# Patient Record
Sex: MALE | Race: WHITE | NOT HISPANIC OR LATINO | URBAN - METROPOLITAN AREA
[De-identification: names, ages, dates, MRNs, and addresses within clinical notes are randomized per-mention and may not be internally consistent; named-entity substitution may affect disease eponyms.]

---

## 2020-02-04 PROBLEM — Z00.00 ENCOUNTER FOR PREVENTIVE HEALTH EXAMINATION: Status: ACTIVE | Noted: 2020-02-04

## 2020-02-10 ENCOUNTER — OUTPATIENT (OUTPATIENT)
Dept: OUTPATIENT SERVICES | Facility: HOSPITAL | Age: 65
LOS: 1 days | Discharge: HOME | End: 2020-02-10

## 2020-02-12 ENCOUNTER — APPOINTMENT (OUTPATIENT)
Dept: HEMATOLOGY ONCOLOGY | Facility: CLINIC | Age: 65
End: 2020-02-12
Payer: COMMERCIAL

## 2020-02-12 ENCOUNTER — RESULT REVIEW (OUTPATIENT)
Age: 65
End: 2020-02-12

## 2020-02-12 ENCOUNTER — OUTPATIENT (OUTPATIENT)
Dept: OUTPATIENT SERVICES | Facility: HOSPITAL | Age: 65
LOS: 1 days | Discharge: HOME | End: 2020-02-12

## 2020-02-12 VITALS
HEART RATE: 107 BPM | SYSTOLIC BLOOD PRESSURE: 136 MMHG | OXYGEN SATURATION: 97 % | BODY MASS INDEX: 33.8 KG/M2 | RESPIRATION RATE: 16 BRPM | DIASTOLIC BLOOD PRESSURE: 83 MMHG | HEIGHT: 64.17 IN | TEMPERATURE: 101.3 F | WEIGHT: 197.98 LBS

## 2020-02-12 DIAGNOSIS — C34.90 MALIGNANT NEOPLASM OF UNSPECIFIED PART OF UNSPECIFIED BRONCHUS OR LUNG: ICD-10-CM

## 2020-02-12 LAB
BASOPHILS # BLD AUTO: 0 K/UL — SIGNIFICANT CHANGE UP (ref 0–0.2)
BASOPHILS NFR BLD AUTO: 0.1 % — SIGNIFICANT CHANGE UP (ref 0–2)
EOSINOPHIL # BLD AUTO: 0 K/UL — SIGNIFICANT CHANGE UP (ref 0–0.5)
EOSINOPHIL NFR BLD AUTO: 0.2 % — SIGNIFICANT CHANGE UP (ref 0–6)
ERYTHROCYTE [SEDIMENTATION RATE] IN BLOOD: 69 MM/HR — HIGH (ref 0–20)
HCT VFR BLD CALC: 38.8 % — LOW (ref 39–50)
HGB BLD-MCNC: 12.7 G/DL — LOW (ref 13–17)
LYMPHOCYTES # BLD AUTO: 1.8 K/UL — SIGNIFICANT CHANGE UP (ref 1–3.3)
LYMPHOCYTES # BLD AUTO: 10.5 % — LOW (ref 13–44)
MCHC RBC-ENTMCNC: 27.5 PG — SIGNIFICANT CHANGE UP (ref 27–34)
MCHC RBC-ENTMCNC: 32.8 G/DL — SIGNIFICANT CHANGE UP (ref 32–36)
MCV RBC AUTO: 84 FL — SIGNIFICANT CHANGE UP (ref 80–100)
MONOCYTES # BLD AUTO: 1 K/UL — HIGH (ref 0–0.9)
MONOCYTES NFR BLD AUTO: 6.2 % — SIGNIFICANT CHANGE UP (ref 2–14)
NEUTROPHILS # BLD AUTO: 14 K/UL — HIGH (ref 1.8–7.4)
NEUTROPHILS NFR BLD AUTO: 83 % — HIGH (ref 43–77)
PLATELET # BLD AUTO: 354 K/UL — SIGNIFICANT CHANGE UP (ref 150–400)
RBC # BLD: 4.62 M/UL — SIGNIFICANT CHANGE UP (ref 4.2–5.8)
RBC # FLD: 16.1 % — HIGH (ref 10.3–14.5)
WBC # BLD: 16.9 K/UL — HIGH (ref 3.8–10.5)
WBC # FLD AUTO: 16.9 K/UL — HIGH (ref 3.8–10.5)

## 2020-02-12 PROCEDURE — 99205 OFFICE O/P NEW HI 60 MIN: CPT

## 2020-02-13 ENCOUNTER — FORM ENCOUNTER (OUTPATIENT)
Age: 65
End: 2020-02-13

## 2020-02-14 ENCOUNTER — APPOINTMENT (OUTPATIENT)
Dept: CT IMAGING | Facility: IMAGING CENTER | Age: 65
End: 2020-02-14
Payer: COMMERCIAL

## 2020-02-14 ENCOUNTER — OUTPATIENT (OUTPATIENT)
Dept: OUTPATIENT SERVICES | Facility: HOSPITAL | Age: 65
LOS: 1 days | End: 2020-02-14
Payer: COMMERCIAL

## 2020-02-14 DIAGNOSIS — C84.00 MYCOSIS FUNGOIDES, UNSPECIFIED SITE: ICD-10-CM

## 2020-02-14 DIAGNOSIS — C34.90 MALIGNANT NEOPLASM OF UNSPECIFIED PART OF UNSPECIFIED BRONCHUS OR LUNG: ICD-10-CM

## 2020-02-14 PROCEDURE — 74177 CT ABD & PELVIS W/CONTRAST: CPT

## 2020-02-14 PROCEDURE — 71260 CT THORAX DX C+: CPT | Mod: 26

## 2020-02-14 PROCEDURE — 74177 CT ABD & PELVIS W/CONTRAST: CPT | Mod: 26

## 2020-02-14 PROCEDURE — 71260 CT THORAX DX C+: CPT

## 2020-02-14 NOTE — HISTORY OF PRESENT ILLNESS
[de-identified] : 64m, smoker (50 p/y), from [facility], with h/o mycosis fungoides, diagnosed in 2015, s/p 7 cycles of CHOP in [facility], and stage I adenocarcinoma of the lung diagnosed 5/2019 s/p surgical resection and 1 cycle of cisplatin/taxol in [facility], and a newly discovered renal mass, presenting to establish care.  A few pages of translated medical records are available from [facility] with the above information, and that is all the patient has. He is not able to obtain more medical records.\par \obinna Chahal reports that for the past 2-3 months, he has been having intermittent fevers to 103, and per pt and his wife, the cause remained unknown in [facility], that is why they traveled to the , where their daughter lives for medical care. Patient's daughter Kamini, is a PA in [place]. \par He also reports that he was told he has a renal mass in [facility]. \par \obinna Tirso reports night sweats, he denies weight loss, has intermittent high fevers and reports chills when he has fevers. Has a good appetite. Denies pain, n/v/c/d, sob, has occasional cough, denies urinary symptoms. Continues to smoke 5 cigarettes daily. \par \par

## 2020-02-14 NOTE — REVIEW OF SYSTEMS
[Patient Intake Form Reviewed] : Patient intake form was reviewed [Fever] : fever [Night Sweats] : night sweats [Negative] : Allergic/Immunologic

## 2020-02-14 NOTE — ASSESSMENT
[FreeTextEntry1] : 64m, smoker (50 p/y), from [facility], with h/o mycosis fungoides, diagnosed in 2015, s/p 7 cycles of CHOP in [facility], and stage I adenocarcinoma of the lung diagnosed 5/2019 s/p surgical resection and 1 cycle of cisplatin/taxol in [facility] and a newly discovered renal mass, presenting to establish care. \par Febrile to 101.6, this is chronic for 2-3 months. \par \par -CBC, CMP, TSH, HIV, Hep B, Hep C, LDH, Uric acid, Ua/Ucx, iron studies, b12, folate, ESR, CRP today\par -CT c/a/p with contrast stat\par -Kamini is patient's daughter who is a PA, he number is [phone], patient asks that we contact her instead of him. \par -Patient and his family understand that he might need to be referred to the ED if work up is suggestive of an acute issue. \par -RTC after the above work up

## 2020-02-25 ENCOUNTER — APPOINTMENT (OUTPATIENT)
Dept: THORACIC SURGERY | Facility: CLINIC | Age: 65
End: 2020-02-25
Payer: COMMERCIAL

## 2020-02-25 VITALS
HEIGHT: 63 IN | SYSTOLIC BLOOD PRESSURE: 135 MMHG | WEIGHT: 200 LBS | DIASTOLIC BLOOD PRESSURE: 85 MMHG | OXYGEN SATURATION: 98 % | TEMPERATURE: 98.7 F | BODY MASS INDEX: 35.44 KG/M2 | HEART RATE: 71 BPM | RESPIRATION RATE: 17 BRPM

## 2020-02-25 DIAGNOSIS — I10 ESSENTIAL (PRIMARY) HYPERTENSION: ICD-10-CM

## 2020-02-25 DIAGNOSIS — F17.200 NICOTINE DEPENDENCE, UNSPECIFIED, UNCOMPLICATED: ICD-10-CM

## 2020-02-25 DIAGNOSIS — Z86.79 PERSONAL HISTORY OF OTHER DISEASES OF THE CIRCULATORY SYSTEM: ICD-10-CM

## 2020-02-25 PROCEDURE — 99205 OFFICE O/P NEW HI 60 MIN: CPT

## 2020-02-25 RX ORDER — METOPROLOL TARTRATE 75 MG/1
TABLET, FILM COATED ORAL
Refills: 0 | Status: ACTIVE | COMMUNITY

## 2020-02-25 RX ORDER — LISINOPRIL 10 MG/1
10 TABLET ORAL
Refills: 0 | Status: ACTIVE | COMMUNITY

## 2020-02-26 ENCOUNTER — APPOINTMENT (OUTPATIENT)
Dept: HEMATOLOGY ONCOLOGY | Facility: CLINIC | Age: 65
End: 2020-02-26
Payer: COMMERCIAL

## 2020-02-26 VITALS
WEIGHT: 196.21 LBS | OXYGEN SATURATION: 97 % | BODY MASS INDEX: 34.76 KG/M2 | HEART RATE: 73 BPM | DIASTOLIC BLOOD PRESSURE: 76 MMHG | SYSTOLIC BLOOD PRESSURE: 116 MMHG | RESPIRATION RATE: 18 BRPM | TEMPERATURE: 98.5 F

## 2020-02-26 DIAGNOSIS — Z87.440 PERSONAL HISTORY OF URINARY (TRACT) INFECTIONS: ICD-10-CM

## 2020-02-26 PROCEDURE — 99214 OFFICE O/P EST MOD 30 MIN: CPT

## 2020-02-27 NOTE — ASSESSMENT
[FreeTextEntry1] : 64 year old male, evaluation of lung cancer, ref: Dr. Pinto.  History of mycosis fungoides in 2015 s/p 7 cycles of CHOP in [place], Stage I lung cancer diagnosed last year and underwent (right side) surgical resection and 1 cycle of ciplatin/taxol in [place].\par \par CT Chest/Abd/Pelvis on 02/14/2020 reveals:\par - Right hilar low-attenuation lymph node, 2 cm\par - Left adrenal nodule (15 mm) and right adrenal nodule (12 mm), indeterminant, unchanged\par \par I have reviewed the patient's medical records and diagnostic images during the time of this office visit, and I have made the following recommendation:\par 1.  Flexible Bronchoscopy, Endobronchial Ultrasound with biopsy\par 2.  The risks, benefits, and alternatives to the procedure were discussed with the patient at length. He verbalized understanding and is in agreement with the above treatment plan. \par 3.  Prior to the above procedure, I have asked him to obtain cardiac clearance - at his request, he was given the contact information for a Spanish speaking cardiologist, Dr. Abdiel Kaplan \par 4.  I have reviewed the importance of smoking cessation with him, and he was given the information for the [facility] for [facility] Control.\par \par \par I personally performed the services described in the documentation, reviewed the documentation recorded by the scribe in my presence and it accurately and completely records my words and actions.\par \par I, Mercy Riley, am scribing for and the presence of ILEANA Willingham the following sections HISTORY OF PRESENT ILLNESSES, PAST MEDICAL/FAMILY/SOCIAL HISTORY; REVIEW OF SYSTEMS; VITAL SIGNS; PHYSICAL EXAM; DISPOSITION.

## 2020-02-27 NOTE — CONSULT LETTER
[Dear  ___] : Dear  [unfilled], [Consult Letter:] : I had the pleasure of evaluating your patient, [unfilled]. [( Thank you for referring [unfilled] for consultation for _____ )] : Thank you for referring [unfilled] for consultation for [unfilled] [Please see my note below.] : Please see my note below. [Consult Closing:] : Thank you very much for allowing me to participate in the care of this patient.  If you have any questions, please do not hesitate to contact me. [Sincerely,] : Sincerely, [FreeTextEntry3] : Franck Ramos MD, FACS \par Chief, Division of Thoracic Surgery \par Director, Minimally Invasive Thoracic Surgery \par Department of Cardiovascular and Thoracic Surgery \par [facility] \par , Cardiovascular and Thoracic Surgery\par \par  [FreeTextEntry2] : Dr. Neeta Pinto (HemOnc/ref)\par Dr. Heidy Lawton (PCP)

## 2020-02-27 NOTE — HISTORY OF PRESENT ILLNESS
[FreeTextEntry1] : Mr. Enrique is a 64 year old male who presents today for evaluation of lung cancer, referred by oncologist, Dr. Pinto.  He is a current smoker who resides in [facility].  Translated medical records reveals a history of mycosis fungoides in 2015 s/p 7 cycles of CHOP in [facility].  Additionally, he was diagnosed with stage I lung cancer last year and underwent (right side) surgical resection and 1 cycle of ciplatin/taxol in [facility].\par \par He reports fevers of unknown origin while in [facility] prompted him to seek medical care in [place] - daughter is a PA in [place]. \par \par CT Chest/Abd/Pelvis on 02/14/2020 reveals:\par - Right hilar low-attenuation lymph node, 2 cm\par - Left adrenal nodule (15 mm) and right adrenal nodule (12 mm), indeterminant, unchanged\par \par He reports worsening sweating + chills.  He denies any fever, cough, shortness of breath, chest pain, hemoptysis, or recent illness.  Of note, he reports completing 10-day course of antibiotics yesterday for UTI.

## 2020-02-27 NOTE — PHYSICAL EXAM
[General Appearance - Alert] : alert [General Appearance - In No Acute Distress] : in no acute distress [Sclera] : the sclera and conjunctiva were normal [PERRL With Normal Accommodation] : pupils were equal in size, round, and reactive to light [Outer Ear] : the ears and nose were normal in appearance [Hearing Threshold Finger Rub Not [place]] : hearing was normal [Neck Appearance] : the appearance of the neck was normal [] : the neck was supple [Heart Sounds] : normal S1 and S2 [Murmurs] : no murmurs [Abdomen Soft] : soft [Abdomen Tenderness] : non-tender [Cervical Lymph Nodes Enlarged Anterior Bilaterally] : anterior cervical [Cervical Lymph Nodes Enlarged Posterior Bilaterally] : posterior cervical [Supraclavicular Lymph Nodes Enlarged Bilaterally] : supraclavicular [Abnormal Walk] : normal gait [Skin Color & Pigmentation] : normal skin color and pigmentation [Skin Turgor] : normal skin turgor [Oriented To Time, Place, And Person] : oriented to person, place, and time [No Focal Deficits] : no focal deficits [Affect] : the affect was normal [Mood] : the mood was normal [FreeTextEntry1] : healing right anterolateral thoracotomy incision

## 2020-03-03 NOTE — HISTORY OF PRESENT ILLNESS
[de-identified] : 64m, smoker (50 p/y), from [facility], with h/o mycosis fungoides, diagnosed in 2015, s/p 7 cycles of CHOP in [facility], and stage I adenocarcinoma of the lung diagnosed 5/2019 s/p surgical resection and 1 cycle of cisplatin/taxol in [facility], and a newly discovered renal mass, presenting to establish care.  A few pages of translated medical records are available from [facility] with the above information, and that is all the patient has. He is not able to obtain more medical records.\par \obinna Chahal reports that for the past 2-3 months, he has been having intermittent fevers to 103, and per pt and his wife, the cause remained unknown in [facility], that is why they traveled to the , where their daughter lives for medical care. Patient's daughter Kamini, is a PA in [place]. \par He also reports that he was told he has a renal mass in [facility]. \par \obinna Tirso reports night sweats, he denies weight loss, has intermittent high fevers and reports chills when he has fevers. Has a good appetite. Denies pain, n/v/c/d, sob, has occasional cough, denies urinary symptoms. Continues to smoke 5 cigarettes daily. \par \par  [de-identified] : Tirso presents for follow up today. \par He has completed the course of antibiotics for his UTI and his fevers have resolved. \par He has seen Dr Ramos and has discussed undergoing biopsy of his large hilar nodes. He is scheduled for cardiology clearance and will follow up with Dr Ramos for bx. \par

## 2020-03-03 NOTE — ASSESSMENT
[FreeTextEntry1] : 64m, smoker (50 p/y), from [facility], with h/o mycosis fungoides, diagnosed in 2015, s/p 7 cycles of CHOP in [facility], and stage I adenocarcinoma of the lung diagnosed 5/2019 s/p surgical resection and 1 cycle of cisplatin/taxol in [facility], and a newly discovered renal mass, presenting for follow up. \par CT scan c/a/p reviewed, enlarged hilar node. Plan is for biopsy with Dr Ramos after cardiology clearance. \par \par -Scan results and labs were reviewed with patient and his family in detail\par -Check UA/UCs to ensure clearance of UTI\par -Will consider sending to malignant hematologist for follow up of past history of Lymphoma after the biopsy\par -Kamini is patient's daughter who is a PA, he number is [phone], patient asks that we contact her instead of him. \par -RTC after biopsy \par \par Neeta Pinto MD\par Medical Oncology and Hematology\par

## 2020-03-05 ENCOUNTER — INPATIENT (INPATIENT)
Facility: HOSPITAL | Age: 65
LOS: 4 days | Discharge: HOME | End: 2020-03-10
Attending: INTERNAL MEDICINE | Admitting: INTERNAL MEDICINE
Payer: COMMERCIAL

## 2020-03-05 ENCOUNTER — APPOINTMENT (OUTPATIENT)
Dept: HEMATOLOGY ONCOLOGY | Facility: CLINIC | Age: 65
End: 2020-03-05

## 2020-03-05 VITALS
RESPIRATION RATE: 20 BRPM | HEART RATE: 91 BPM | DIASTOLIC BLOOD PRESSURE: 87 MMHG | TEMPERATURE: 101 F | SYSTOLIC BLOOD PRESSURE: 142 MMHG | OXYGEN SATURATION: 100 %

## 2020-03-05 DIAGNOSIS — Z02.9 ENCOUNTER FOR ADMINISTRATIVE EXAMINATIONS, UNSPECIFIED: ICD-10-CM

## 2020-03-05 DIAGNOSIS — N12 TUBULO-INTERSTITIAL NEPHRITIS, NOT SPECIFIED AS ACUTE OR CHRONIC: ICD-10-CM

## 2020-03-05 DIAGNOSIS — N39.0 URINARY TRACT INFECTION, SITE NOT SPECIFIED: ICD-10-CM

## 2020-03-05 DIAGNOSIS — C34.90 MALIGNANT NEOPLASM OF UNSPECIFIED PART OF UNSPECIFIED BRONCHUS OR LUNG: ICD-10-CM

## 2020-03-05 DIAGNOSIS — Z90.2 ACQUIRED ABSENCE OF LUNG [PART OF]: Chronic | ICD-10-CM

## 2020-03-05 DIAGNOSIS — Z29.9 ENCOUNTER FOR PROPHYLACTIC MEASURES, UNSPECIFIED: ICD-10-CM

## 2020-03-05 LAB
ALBUMIN SERPL ELPH-MCNC: 3.9 G/DL — SIGNIFICANT CHANGE UP (ref 3.3–5)
ALP SERPL-CCNC: 73 U/L — SIGNIFICANT CHANGE UP (ref 40–120)
ALT FLD-CCNC: 12 U/L — SIGNIFICANT CHANGE UP (ref 4–41)
ANION GAP SERPL CALC-SCNC: 14 MMO/L — SIGNIFICANT CHANGE UP (ref 7–14)
APPEARANCE UR: CLEAR — SIGNIFICANT CHANGE UP
APTT BLD: 29.3 SEC — SIGNIFICANT CHANGE UP (ref 27.5–36.3)
AST SERPL-CCNC: 12 U/L — SIGNIFICANT CHANGE UP (ref 4–40)
BACTERIA # UR AUTO: HIGH
BASE EXCESS BLDV CALC-SCNC: 3.1 MMOL/L — SIGNIFICANT CHANGE UP
BASOPHILS # BLD AUTO: 0.03 K/UL — SIGNIFICANT CHANGE UP (ref 0–0.2)
BASOPHILS NFR BLD AUTO: 0.2 % — SIGNIFICANT CHANGE UP (ref 0–2)
BILIRUB SERPL-MCNC: 0.4 MG/DL — SIGNIFICANT CHANGE UP (ref 0.2–1.2)
BILIRUB UR-MCNC: NEGATIVE — SIGNIFICANT CHANGE UP
BLOOD GAS VENOUS - CREATININE: 0.91 MG/DL — SIGNIFICANT CHANGE UP (ref 0.5–1.3)
BLOOD GAS VENOUS - FIO2: 21 — SIGNIFICANT CHANGE UP
BLOOD UR QL VISUAL: HIGH
BUN SERPL-MCNC: 13 MG/DL — SIGNIFICANT CHANGE UP (ref 7–23)
CALCIUM SERPL-MCNC: 9.5 MG/DL — SIGNIFICANT CHANGE UP (ref 8.4–10.5)
CHLORIDE BLDV-SCNC: 105 MMOL/L — SIGNIFICANT CHANGE UP (ref 96–108)
CHLORIDE SERPL-SCNC: 99 MMOL/L — SIGNIFICANT CHANGE UP (ref 98–107)
CO2 SERPL-SCNC: 21 MMOL/L — LOW (ref 22–31)
COLOR SPEC: YELLOW — SIGNIFICANT CHANGE UP
CREAT SERPL-MCNC: 0.83 MG/DL — SIGNIFICANT CHANGE UP (ref 0.5–1.3)
EOSINOPHIL # BLD AUTO: 0.04 K/UL — SIGNIFICANT CHANGE UP (ref 0–0.5)
EOSINOPHIL NFR BLD AUTO: 0.3 % — SIGNIFICANT CHANGE UP (ref 0–6)
GAS PNL BLDV: 137 MMOL/L — SIGNIFICANT CHANGE UP (ref 136–146)
GLUCOSE BLDV-MCNC: 110 MG/DL — HIGH (ref 70–99)
GLUCOSE SERPL-MCNC: 109 MG/DL — HIGH (ref 70–99)
GLUCOSE UR-MCNC: NEGATIVE — SIGNIFICANT CHANGE UP
HCO3 BLDV-SCNC: 25 MMOL/L — SIGNIFICANT CHANGE UP (ref 20–27)
HCT VFR BLD CALC: 41.1 % — SIGNIFICANT CHANGE UP (ref 39–50)
HCT VFR BLDV CALC: 40.9 % — SIGNIFICANT CHANGE UP (ref 39–51)
HGB BLD-MCNC: 12.6 G/DL — LOW (ref 13–17)
HGB BLDV-MCNC: 13.3 G/DL — SIGNIFICANT CHANGE UP (ref 13–17)
HYALINE CASTS # UR AUTO: NEGATIVE — SIGNIFICANT CHANGE UP
IMM GRANULOCYTES NFR BLD AUTO: 0.5 % — SIGNIFICANT CHANGE UP (ref 0–1.5)
INR BLD: 1.17 — SIGNIFICANT CHANGE UP (ref 0.88–1.17)
KETONES UR-MCNC: NEGATIVE — SIGNIFICANT CHANGE UP
LACTATE BLDV-MCNC: 1.7 MMOL/L — SIGNIFICANT CHANGE UP (ref 0.5–2)
LEUKOCYTE ESTERASE UR-ACNC: SIGNIFICANT CHANGE UP
LYMPHOCYTES # BLD AUTO: 1.6 K/UL — SIGNIFICANT CHANGE UP (ref 1–3.3)
LYMPHOCYTES # BLD AUTO: 12.1 % — LOW (ref 13–44)
MCHC RBC-ENTMCNC: 25.6 PG — LOW (ref 27–34)
MCHC RBC-ENTMCNC: 30.7 % — LOW (ref 32–36)
MCV RBC AUTO: 83.5 FL — SIGNIFICANT CHANGE UP (ref 80–100)
MONOCYTES # BLD AUTO: 1.26 K/UL — HIGH (ref 0–0.9)
MONOCYTES NFR BLD AUTO: 9.5 % — SIGNIFICANT CHANGE UP (ref 2–14)
NEUTROPHILS # BLD AUTO: 10.27 K/UL — HIGH (ref 1.8–7.4)
NEUTROPHILS NFR BLD AUTO: 77.4 % — HIGH (ref 43–77)
NITRITE UR-MCNC: POSITIVE — HIGH
NRBC # FLD: 0 K/UL — SIGNIFICANT CHANGE UP (ref 0–0)
PCO2 BLDV: 49 MMHG — SIGNIFICANT CHANGE UP (ref 41–51)
PH BLDV: 7.37 PH — SIGNIFICANT CHANGE UP (ref 7.32–7.43)
PH UR: 6 — SIGNIFICANT CHANGE UP (ref 5–8)
PLATELET # BLD AUTO: 234 K/UL — SIGNIFICANT CHANGE UP (ref 150–400)
PMV BLD: 9.8 FL — SIGNIFICANT CHANGE UP (ref 7–13)
PO2 BLDV: < 24 MMHG — LOW (ref 35–40)
POTASSIUM BLDV-SCNC: 4.9 MMOL/L — HIGH (ref 3.4–4.5)
POTASSIUM SERPL-MCNC: 4.7 MMOL/L — SIGNIFICANT CHANGE UP (ref 3.5–5.3)
POTASSIUM SERPL-SCNC: 4.7 MMOL/L — SIGNIFICANT CHANGE UP (ref 3.5–5.3)
PROT SERPL-MCNC: 7.7 G/DL — SIGNIFICANT CHANGE UP (ref 6–8.3)
PROT UR-MCNC: 50 — SIGNIFICANT CHANGE UP
PROTHROM AB SERPL-ACNC: 13.5 SEC — HIGH (ref 9.8–13.1)
RBC # BLD: 4.92 M/UL — SIGNIFICANT CHANGE UP (ref 4.2–5.8)
RBC # FLD: 17.9 % — HIGH (ref 10.3–14.5)
RBC CASTS # UR COMP ASSIST: HIGH (ref 0–?)
SAO2 % BLDV: 21.6 % — LOW (ref 60–85)
SODIUM SERPL-SCNC: 134 MMOL/L — LOW (ref 135–145)
SP GR SPEC: 1.03 — SIGNIFICANT CHANGE UP (ref 1–1.04)
SQUAMOUS # UR AUTO: SIGNIFICANT CHANGE UP
UROBILINOGEN FLD QL: NORMAL — SIGNIFICANT CHANGE UP
WBC # BLD: 13.26 K/UL — HIGH (ref 3.8–10.5)
WBC # FLD AUTO: 13.26 K/UL — HIGH (ref 3.8–10.5)
WBC UR QL: >50 — HIGH (ref 0–?)

## 2020-03-05 PROCEDURE — 99223 1ST HOSP IP/OBS HIGH 75: CPT

## 2020-03-05 PROCEDURE — 99223 1ST HOSP IP/OBS HIGH 75: CPT | Mod: AI,GC

## 2020-03-05 PROCEDURE — 71046 X-RAY EXAM CHEST 2 VIEWS: CPT | Mod: 26

## 2020-03-05 RX ORDER — LISINOPRIL 2.5 MG/1
10 TABLET ORAL DAILY
Refills: 0 | Status: DISCONTINUED | OUTPATIENT
Start: 2020-03-05 | End: 2020-03-05

## 2020-03-05 RX ORDER — ERTAPENEM SODIUM 1 G/1
1000 INJECTION, POWDER, LYOPHILIZED, FOR SOLUTION INTRAMUSCULAR; INTRAVENOUS ONCE
Refills: 0 | Status: COMPLETED | OUTPATIENT
Start: 2020-03-05 | End: 2020-03-05

## 2020-03-05 RX ORDER — METOPROLOL TARTRATE 50 MG
25 TABLET ORAL
Refills: 0 | Status: DISCONTINUED | OUTPATIENT
Start: 2020-03-05 | End: 2020-03-05

## 2020-03-05 RX ORDER — ACETAMINOPHEN 500 MG
975 TABLET ORAL ONCE
Refills: 0 | Status: COMPLETED | OUTPATIENT
Start: 2020-03-05 | End: 2020-03-05

## 2020-03-05 RX ORDER — SODIUM CHLORIDE 9 MG/ML
1000 INJECTION INTRAMUSCULAR; INTRAVENOUS; SUBCUTANEOUS ONCE
Refills: 0 | Status: COMPLETED | OUTPATIENT
Start: 2020-03-05 | End: 2020-03-05

## 2020-03-05 RX ORDER — ERTAPENEM SODIUM 1 G/1
1000 INJECTION, POWDER, LYOPHILIZED, FOR SOLUTION INTRAMUSCULAR; INTRAVENOUS EVERY 24 HOURS
Refills: 0 | Status: DISCONTINUED | OUTPATIENT
Start: 2020-03-06 | End: 2020-03-10

## 2020-03-05 RX ADMIN — SODIUM CHLORIDE 1000 MILLILITER(S): 9 INJECTION INTRAMUSCULAR; INTRAVENOUS; SUBCUTANEOUS at 11:50

## 2020-03-05 RX ADMIN — ERTAPENEM SODIUM 120 MILLIGRAM(S): 1 INJECTION, POWDER, LYOPHILIZED, FOR SOLUTION INTRAMUSCULAR; INTRAVENOUS at 13:10

## 2020-03-05 RX ADMIN — Medication 975 MILLIGRAM(S): at 11:50

## 2020-03-05 NOTE — ED PROVIDER NOTE - CARE PLAN
Principal Discharge DX:	Pyelonephritis Principal Discharge DX:	Pyelonephritis  Secondary Diagnosis:	UTI due to extended-spectrum beta lactamase (ESBL) producing Escherichia coli

## 2020-03-05 NOTE — ED PROVIDER NOTE - ATTENDING CONTRIBUTION TO CARE
64M sent from [facility] h/o SCCa, recent UTI on abx, pt thinks he's got another UTI.  Got tylenol.  Translate from [place] by wife.  H/o NHL and lung CA; being evaluated for lymph node enlargement awaiting biopsy.   Chronic urine infection, recent abx from Feb 25 given abx x 10 days  [facility] - Dr Pinto.  Pt was at [facility] getting a urine test.  No dysuria.  Pt having some R flanks pain and high fever and had 104 fever yesterday; has been having fever x 2 days.  No vomiting no passing out, (+)chills, sweats and fever.  Had Ucx from mid feb showing ESBL e-coli, as well as CT during that visit which was normal as far as kidneys go.  Occ ETOH, current smoker.  No runny nose or coughing.  No SOB.  Pt came here from [facility] Jan 20th.  PMHX HTN, NHL, possible DM.   PMD Heidy Cruz.  Plan checking labs, urine, rx carbapenem for ESBL, rx fluids, tylenol, admit for resistant UTI.   VS:  unremarkable except fever    GEN - NAD;   malaise;   A+O x3   HEAD - NC/AT     ENT - PEERL, EOMI, mucous membranes   dry , no discharge      NECK: Neck supple, non-tender without lymphadenopathy, no masses, no JVD  PULM - CTA b/l,  symmetric breath sounds  COR -  normal heart sounds    ABD - , ND, NT, soft,  BACK - no CVA tenderness, nontender spine     EXTREMS - no edema, no deformity, warm and well perfused    SKIN - no rash    or bruising      NEUROLOGIC - alert, face symmetric, speech fluent, sensation nl, motor no focal deficit.

## 2020-03-05 NOTE — H&P ADULT - NSHPLABSRESULTS_GEN_ALL_CORE
03-    134<L>  |  99  |  13  ----------------------------<  109<H>  4.7   |  21<L>  |  0.83    Ca    9.5      05 Mar 2020 11:40    TPro  7.7  /  Alb  3.9  /  TBili  0.4  /  DBili  x   /  AST  12  /  ALT  12  /  AlkPhos  73  03-05      PT/INR - ( 05 Mar 2020 11:40 )   PT: 13.5 SEC;   INR: 1.17          PTT - ( 05 Mar 2020 11:40 )  PTT:29.3 SEC              Urinalysis Basic - ( 05 Mar 2020 11:20 )    Color: YELLOW / Appearance: CLEAR / S.026 / pH: 6.0  Gluc: NEGATIVE / Ketone: NEGATIVE  / Bili: NEGATIVE / Urobili: NORMAL   Blood: MODERATE / Protein: 50 / Nitrite: POSITIVE   Leuk Esterase: LARGE / RBC: 6-10 / WBC >50   Sq Epi: OCC / Non Sq Epi: x / Bacteria: MANY                              12.6   13.26 )-----------( 234      ( 05 Mar 2020 11:40 )             41.1     CAPILLARY BLOOD GLUCOSE

## 2020-03-05 NOTE — CONSULT NOTE ADULT - ASSESSMENT
63 yo man with h/o lymphoma, h/o lung adenocarcinoma s/p surgical resection and chemo in [facility]  presenting with fever and found to have pyuria.   ESBL E coli cultured in urine 2/2020.  Recurrent UTI.  Blood culture  Urine culture testing    Suggest;  -continue ertapenem 1 gm iv q 24 h  -follow final blood and urine cultures      Farnaz Yeager MD  Pager: [phone]  After 5 PM or weekends please call fellow on call or office [phone]

## 2020-03-05 NOTE — H&P ADULT - NSHPPHYSICALEXAM_GEN_ALL_CORE
Vital Signs Last 24 Hrs  T(C): 37 (05 Mar 2020 17:15), Max: 38.3 (05 Mar 2020 10:35)  T(F): 98.6 (05 Mar 2020 17:15), Max: 100.9 (05 Mar 2020 10:35)  HR: 76 (05 Mar 2020 17:15) (76 - 91)  BP: 102/68 (05 Mar 2020 17:15) (102/68 - 143/73)  BP(mean): --  RR: 17 (05 Mar 2020 17:15) (16 - 20)  SpO2: 98% (05 Mar 2020 17:15) (98% - 100%)    PHYSICAL EXAM:  GENERAL: NAD, well-groomed, well-developed  EYES: EOMI, PERRLA, conjunctiva and sclera clear  ENMT: No tonsillar erythema, exudates, or enlargement; Moist mucous membranes  NECK: Supple, No JVD, Normal thyroid  CHEST/LUNG: Clear to auscultation bilaterally; No crackles, rhonchi, wheezing, or rubs  HEART: Regular rate and rhythm; No murmurs, rubs, or gallops  ABDOMEN: Soft, Nontender, Nondistended; Bowel sounds present  EXTREMITIES:  2+ Peripheral Pulses, No clubbing, cyanosis, or edema  LYMPH: No lymphadenopathy noted  SKIN: No rashes or lesions  NERVOUS SYSTEM:  Alert & Oriented X3, Good concentration; Motor Strength 5/5 B/L upper and lower extremities; DTRs 2+ intact and symmetric Vital Signs Last 24 Hrs  T(C): 37 (05 Mar 2020 17:15), Max: 38.3 (05 Mar 2020 10:35)  T(F): 98.6 (05 Mar 2020 17:15), Max: 100.9 (05 Mar 2020 10:35)  HR: 76 (05 Mar 2020 17:15) (76 - 91)  BP: 102/68 (05 Mar 2020 17:15) (102/68 - 143/73)  BP(mean): --  RR: 17 (05 Mar 2020 17:15) (16 - 20)  SpO2: 98% (05 Mar 2020 17:15) (98% - 100%)    PHYSICAL EXAM:  GENERAL: NAD, well-groomed, well-developed, conversant and responding appropriately  EYES: EOMI, PERRLA, conjunctiva and sclera clear  ENMT: No tonsillar erythema, exudates, or enlargement; Moist mucous membranes, upper dentures  NECK: Supple, no LAD  CHEST/LUNG: Clear to auscultation bilaterally; No crackles, rhonchi, wheezing, or rubs, R ant chest surgical scar present, chronic.  HEART: Regular rate and rhythm; No murmurs, rubs, or gallops  ABDOMEN: Soft, Nontender, distended due to body habitus; Bowel sounds present. No flank pain b/l.  Back: No CVA tenderness b/l.  EXTREMITIES:  2+ Peripheral Pulses, No clubbing, cyanosis, or edema  LYMPH: No lymphadenopathy noted  SKIN: No rashes or lesions, limited skin exam  NERVOUS SYSTEM:  Alert & Oriented,, Good concentration.

## 2020-03-05 NOTE — ED PROVIDER NOTE - CLINICAL SUMMARY MEDICAL DECISION MAKING FREE TEXT BOX
[place], PGY1 - 64M PMH SCLC p/w fevers and R flank pain in context of recent UTI tx with PO abx, UCx showing ESBL E.coli. Febrile, but nontachycardic. Non-toxic appearing. Concern for resistant UTI/pyelo. Plan: labs, ivf, tylenol, ertapenem, TBA.

## 2020-03-05 NOTE — H&P ADULT - PROBLEM SELECTOR PLAN 2
#Dx and tx in [facility], s/p lung resection and chemo. Not on chemo currently. Recently detected R hilar LAD.  - Oncology consulted - to see patient.  - No acute interventions currently.

## 2020-03-05 NOTE — H&P ADULT - ASSESSMENT
65 yo M smoker with hx stage 1 lung adenocarcinoma dx in 2015, s/p surgical resection & CHOP/chemo in [facility], with reportedly new renal mass, HTN and hx mycosis fungoides who presents with recurrent UTI (s/p recent tx, hx ESBL 2/12/20 UCx), admitted for management of urosepsis, likely ESBL E. coli UTI - pending UCx.    ID consulted, c/w ertapenem 1 g q24h

## 2020-03-05 NOTE — H&P ADULT - HISTORY OF PRESENT ILLNESS
65 yo M with hx stage 1 lung adenocarcinoma s/p surgical resection & CHOP in [facility], with reportedly new renal mass, who presents with recurrent UTI (s/p recent 63 yo M smoker with hx stage 1 lung adenocarcinoma dx in 2015, s/p surgical resection & CHOP/chemo in [facility], with reportedly new renal mass, and hx mycosis fungoides who presents with recurrent UTI (s/p recent tx, hx ESBL 2/12/20 UCx), admitted for management of likely ESBL E. coli UTI.    Patient is originally from [facility], [place] came [place] to [place] medical care. He has a hx stage 1 lung adenocarcinoma dx in 2015, s/p surgical resection & CHOP/chemo in [facility], with reportedly new renal mass. He was also found on recent imaging to have a R hilar lymph node which was supposed to be biopsied this month. Pt was treated outpatient for a UTI, finished a course of antibiotics (unclear what). EMR records outpt showed ESBL E. coli on UCx 2/12/20.     Patient presented due to fever, chills and R flank pain x 2 days. No dysuria or hematuria or increased urinary frequency. 63 yo M smoker with hx stage 1 lung adenocarcinoma dx in 2015, s/p surgical resection & CHOP/chemo in [facility], with reportedly new renal mass, HTN and hx mycosis fungoides who presents with recurrent UTI (s/p recent tx, hx ESBL 2/12/20 UCx), admitted for management of urosepsis, likely ESBL E. coli UTI - pending UCx.    Patient is originally from [facility], recently came to [place] care; daughter lives in [place]. He has a hx stage 1 lung adenocarcinoma dx in 2015, s/p surgical resection & CHOP/chemo in [facility], with reportedly a new renal mass (records pending from [facility]). He was also found on recent outpatient imaging to have a R hilar lymph node which was supposed to be biopsied this month. Pt was treated outpatient for a UTI, finished a course of antibiotics (unclear what). EMR records outpt showed ESBL E. coli on UCx 2/12/20.     Patient presented due to fever, chills and R flank pain x 2 days. No dysuria or hematuria or increased urinary frequency. ROS otherwise unremarkable.

## 2020-03-05 NOTE — ED PROVIDER NOTE - PHYSICAL EXAMINATION
I have reviewed the triage vital signs.  Const: AAOx3, in NAD  Eyes: no conjunctival injection  HENT: NCAT, Neck supple, oral mucosa moist  CV: RRR, +S1, S2  Resp: CTAB, no respiratory distress  GI: Abdomen soft, NTND, no guarding, no CVA tenderness  Extremities: No peripheral edema,  2+ radial and DP pulses  Skin: Warm, well perfused, no rash  MSK: No gross deformities appreciated  Neuro: No focal sensory or motor deficits  Psych: Appropriate mood and affect

## 2020-03-05 NOTE — ED ADULT NURSE NOTE - OBJECTIVE STATEMENT
Pt arrives to rm 23--being treated for small cell lung ca--Pt also c/o being treated recently for UTI which ended. Pt denies any pain--pt c/o fever. Pt alert and orientated x3--color pink,skin clean dry and intact.

## 2020-03-05 NOTE — H&P ADULT - NSHPSOCIALHISTORY_GEN_ALL_CORE
Marital Status:  (   )    (   ) Single    (   )    (  )   Occupation:   Lives with: (  ) alone  (  ) children   (  ) spouse   (  ) parents  (  ) other    Substance Use (street drugs): (  ) never used  (  ) other:  Tobacco Usage:  (   ) never smoked   (   ) former smoker   (   ) current smoker  (     ) pack year  (        ) last cigarette date  Alcohol Usage:  Sexual History: Marital Status:  (  x )    (   ) Single    (   )    (  )   Occupation:   Lives with: (  ) alone  (  ) children   ( x ) spouse   (  ) parents  (  ) other    Substance Use (street drugs): (  ) never used  (  ) other:  Tobacco Usage:  (   ) never smoked   (   ) former smoker   ( x  ) current smoker  5 cig x d x 50 years  Alcohol Usage: not currently, past use  Sexual History:

## 2020-03-05 NOTE — H&P ADULT - PROBLEM SELECTOR PLAN 4
Transitions of Care Status:  1.  Name of PCP: Heidy Lawton  2.  PCP Contacted on Admission: [ ] Y    [x ] N    3.  PCP contacted at Discharge: [ ] Y    [ ] N    [ ] N/A  4.  Post-Discharge Appointment Date and Location:  5.  Summary of Handoff given to PCP:

## 2020-03-05 NOTE — H&P ADULT - PROBLEM SELECTOR PLAN 3
[facility] Emergency Department  [address] [address]  Phone:  [phone]  Fax:  [phone]                                    Melina Caicedo   MRN: [id number]    Department:  [facility] Emergency Department   Date of Visit:  4/7/2019           After Visit Summary Signature Page    I have received my discharge instructions, and my questions have been answered. I have discussed any challenges I see with this plan with the nurse or doctor.    ..........................................................................................................................................  Patient/Patient Representative Signature      ..........................................................................................................................................  Patient Representative Print Name and Relationship to Patient    ..................................................               ................................................  Date                                   Time    ..........................................................................................................................................  Reviewed by Signature/Title    ...................................................              ..............................................  Date                                               Time          22EPIC Rev 08/18        Diet: regular  DVT ppx: hold if plans for LN bx per onc  Dispo: pending urine culture and tx of UTI Diet: regular  DVT ppx: hold if plans for LN bx per onc, re-assess tomorrow  Dispo: pending urine culture and tx of UTI Hx HTN: BPs normal, hold lisinopril and Lopressor (home meds) given urosepsis, reassess tomorrow  Diet: regular  DVT ppx: hold if plans for LN bx per onc, re-assess tomorrow  Dispo: pending urine culture and tx of UTI

## 2020-03-05 NOTE — ED PROVIDER NOTE - NS ED ROS FT
General: +Fevers, chills  Eyes: Denies vision changes  ENMT: Denies trouble swallowing or speaking, or sore throat  CV: Denies chest pain or palpitations  Resp: Denies cough or SOB  GI: Denies abdominal pain, nausea, vomiting, diarrhea, or constipation   : Denies painful urination, increased urinary frequency, or blood in urine  Skin: Denies new rashes  Neuro: Denies headache, numbness, or weakness  MSK: +R flank pain. Denies recent trauma

## 2020-03-05 NOTE — ED PROVIDER NOTE - OBJECTIVE STATEMENT
64M PMH small cell lung ca p/w fevers, chills, and R flank pain x 2 days. Of note, pt completed 10d course of PO abx for UTI 11d ago. UCx 2/14/20 found to be positive for E.coli ESBL (sensitive to Ertapenem). Pt presented to [facility] today for repeat UA for concerns of recurrent UTI. No dysuria, blood in urine, or urinary urgency. No CP, SOB, URI sx, abdominal pain, constipation, diarrhea.     Of note, pt had CT A/P and CT chest performed on 2/14/20 showing unchanged R hilar lymph node. Has a biopsy scheduled.

## 2020-03-05 NOTE — H&P ADULT - NSICDXPASTMEDICALHX_GEN_ALL_CORE_FT
PAST MEDICAL HISTORY:  Small cell lung cancer PAST MEDICAL HISTORY:  Hypertension     Mycosis fungoides     Stage I adenocarcinoma of lung, unspecified laterality s/p lung resection R lung, s/p CHOP, cisplatin

## 2020-03-05 NOTE — H&P ADULT - PROBLEM SELECTOR PLAN 1
#Hx recently treated UTI, outpt UCx 2/12/20 with ESBL E. coli. p/w fever, chills and flank pain. A/f complicated UTI with resistant organism --> urosepsis (fever, leukocytosis and urinary source). Low c/f pyelonephritis given lack of CVA tenderness and nontoxic appearance/labs.

## 2020-03-05 NOTE — CONSULT NOTE ADULT - SUBJECTIVE AND OBJECTIVE BOX
HPI:  65 yo M smoker with hx stage 1 lung adenocarcinoma dx in , s/p surgical resection & CHOP/chemo in [facility], with reportedly new renal mass, and hx mycosis fungoides who presents with recurrent UTI (s/p recent tx, hx ESBL 20 UCx), admitted for management of likely ESBL E. coli UTI.    Patient is originally from [facility], recently came to [place] to establish medical care. He has a hx stage 1 lung adenocarcinoma dx in , s/p surgical resection & CHOP/chemo in [facility], with reportedly new renal mass. He was also found on recent imaging to have a R hilar lymph node which was supposed to be biopsied this month. Pt was treated outpatient for a UTI, finished a course of antibiotics (unclear what). EMR records outpt showed ESBL E. coli on UCx 20.     Patient presented due to fever, chills and R flank pain x 2 days. No dysuria or hematuria or increased urinary frequency.     wife at bedside.      patient denies chills, dysuria now.  received ertapenem this afternoon.    PAST MEDICAL & SURGICAL HISTORY:  Small cell lung cancer  S/P lobectomy of lung      Allergies    No Known Allergies    Intolerances        ANTIMICROBIALS:      OTHER MEDS:  lisinopril 10 milliGRAM(s) Oral daily  metoprolol tartrate 25 milliGRAM(s) Oral two times a day      SOCIAL HISTORY: lives with family.  immigrated from [facility].    FAMILY HISTORY: non contributory      REVIEW OF SYSTEMS  [  ] ROS unobtainable because:    [ x ] All other systems negative except as noted below:	    Constitutional:  [x ] fever [ ] chills  [ ] weight loss  [ ] weakness  Skin:  [ ] rash [ ] phlebitis	  Eyes: [ ] icterus [ ] pain  [ ] discharge	  ENMT: [ ] sore throat  [ ] thrush [ ] ulcers [ ] exudates  Respiratory: [ ] dyspnea [ ] hemoptysis [ ] cough [ ] sputum	  Cardiovascular:  [ ] chest pain [ ] palpitations [ ] edema	  Gastrointestinal:  [ ] nausea [ ] vomiting [ ] diarrhea [ ] constipation [ ] pain	  Genitourinary:  [ ] dysuria [ ] frequency [ ] hematuria [ ] discharge [x ] flank pain  [ ] incontinence  Musculoskeletal:  [ ] myalgias [ ] arthralgias [ ] arthritis  [ ] back pain  Neurological:  [ ] headache [ ] seizures  [ ] confusion/altered mental status  Psychiatric:  [ ] anxiety [ ] depression	  Hematology/Lymphatics:  [ ] lymphadenopathy  Endocrine:  [ ] adrenal [ ] thyroid  Allergic/Immunologic:	 [ ] transplant [ ] seasonal    PHYSICAL EXAM:  General: [x ] non-toxic, sitting side of stretcher in ER.  HEAD/EYES: [ ] PERRL [x ] white sclera [ ] icterus  ENT:  [ ] normal [ x] supple [ ] thrush [ ] pharyngeal exudate  Cardiovascular:   [ ] murmur [x ] normal [ ] PPM/AICD  Respiratory:  [x ] clear to ausculation bilaterally  GI:  [x ] soft, non-tender, normal bowel sounds  :  [ ] martinez [x ] no CVA tenderness   Musculoskeletal:  [x ] no synovitis  Neurologic:  [x ] non-focal exam   Skin:  [x ] no rash  Lymph: [ ] no lymphadenopathy  Psychiatric:  [x ] appropriate affect [x ] alert & oriented  Lines:  [x ] no phlebitis [ ] central line          Drug Dosing Weight      Vital Signs Last 24 Hrs  T(F): 98.6 (20 @ 17:15), Max: 100.9 (20 @ 10:35)    Vital Signs Last 24 Hrs  HR: 76 (20 @ 17:15) (76 - 91)  BP: 102/68 (20 @ 17:15) (102/68 - 143/73)  RR: 17 (20 @ 17:15)  SpO2: 98% (20 @ 17:15) (98% - 100%)  Wt(kg): --                          12.6   13.26 )-----------( 234      ( 05 Mar 2020 11:40 )             41.1       03-    134<L>  |  99  |  13  ----------------------------<  109<H>  4.7   |  21<L>  |  0.83    Ca    9.5      05 Mar 2020 11:40    TPro  7.7  /  Alb  3.9  /  TBili  0.4  /  DBili  x   /  AST  12  /  ALT  12  /  AlkPhos  73  03-05      Urinalysis Basic - ( 05 Mar 2020 11:20 )    Color: YELLOW / Appearance: CLEAR / S.026 / pH: 6.0  Gluc: NEGATIVE / Ketone: NEGATIVE  / Bili: NEGATIVE / Urobili: NORMAL   Blood: MODERATE / Protein: 50 / Nitrite: POSITIVE   Leuk Esterase: LARGE / RBC: 6-10 / WBC >50   Sq Epi: OCC / Non Sq Epi: x / Bacteria: MANY        MICROBIOLOGY:    blood x 2   urine testing      RADIOLOGY:    < from: Xray Chest 2 Views PA/Lat (20 @ 12:42) >    IMPRESSION:  Low lung volumes.    Right hilar region surgical chain staples with adjacent obliquely oriented curvilinear opacity compatible with scarring. Clear remaining visualized lungs. No pleural effusions or pneumothorax.    Stable cardiac and mediastinal silhouettes.     Trachea midline.    Redemonstrated old anterolateral right 5th rib fracture deformity. Spinal degenerative changes again noted.    < end of copied text >  < from: CT Abdomen and Pelvis w/ Oral Cont and w/ IV Cont (20 @ 14:12) >  INTERPRETATION:  CLINICAL INFORMATION: 64-year-old man with history of lung cancer and lymphoma    COMPARISON: CT scan 12/10/2019.     PROCEDURE:   CT of the Chest, Abdomen and Pelvis was performed with intravenous contrast.   Intravenous contrast: 90 ml Omnipaque 350. 10 ml discarded.  Oral contrast: Positive contrast was administered.  Sagittal and coronal reformats were performed.    FINDINGS:    CHEST:     LUNGS AND LARGE AIRWAYS: Patent central airways. Apparent right middle lobectomy. Right lower lobe scarring.  PLEURA: No pleural effusion.  VESSELS: Within normal limits.  HEART: Heart size is normal. No pericardial effusion. Coronary artery calcification.  MEDIASTINUM AND CHELO: Right hilar low-attenuation lymph node 2 cm  similar to prior examination.   CHEST WALL AND LOWER NECK: Within normal limits.    ABDOMEN AND PELVIS:    LIVER: Subcentimeter left lobe hypodensity unchanged   BILE DUCTS: Normal caliber.  GALLBLADDER: Within normal limits.  SPLEEN: Within normal limits.  PANCREAS: Within normal limits.  ADRENALS: 15 mm left adrenal nodule and 12 mm right adrenal nodule, indeterminant, unchanged. KIDNEYS/URETERS: Within normal limits.    BLADDER: Within normal limits.  REPRODUCTIVE ORGANS: Normal prostate.    BOWEL: No bowel obstruction. Appendix normal.  PERITONEUM: No ascites.  VESSELS: Mild ectasia infrarenal abdominal aorta.  RETROPERITONEUM/LYMPH NODES: No lymphadenopathy.    ABDOMINAL WALL: Within normal limits.  BONES: Degenerative change.    IMPRESSION:     Enlarged right hilar lymph node unchanged.  Apparent right middle lobectomy with postsurgical scarring.  Bilateral small indeterminant adrenal nodule is unchanged.    < end of copied text >

## 2020-03-05 NOTE — H&P ADULT - ATTENDING COMMENTS
64 M with hx of stage 1 lung adenocarcinoma s/p resection, s/p 1 cycle of chemo in 2019, presumed in remission, with hx of recurrent UTI who had fever about 2 weeks ago, went to PMD had ucx sent, started on Macrobid, - saw oncology for presx testing for newly found hilar node planned for bx on 3/19, had fevers again with repeat ucx initially neg, then showing ESBL E-coli now comes in with fevers and flank pain admitted for sepsis due to pyelo, will start Erta, f/u ucx/bcx. recurrent UTI, ? prostatitis - ID eval. ONC f.u

## 2020-03-05 NOTE — H&P ADULT - NSHPREVIEWOFSYSTEMS_GEN_ALL_CORE
CONSTITUTIONAL: No weakness. +fevers and chills  EYES/ENT: No visual changes;   RESPIRATORY: No cough, wheezing, No shortness of breath  CARDIOVASCULAR: No chest pain or palpitations  GASTROINTESTINAL: No abdominal or epigastric pain. + R flank/back pain. No nausea, vomitingNo diarrhea or constipation.   GENITOURINARY: No dysuria, frequency or hematuria  NEUROLOGICAL: No numbness or weakness CONSTITUTIONAL: No weakness. +fevers and chills  EYES/ENT: No visual changes;   RESPIRATORY: No cough, wheezing, No shortness of breath  CARDIOVASCULAR: No chest pain or palpitations  GASTROINTESTINAL: No abdominal or epigastric pain. + R flank/back pain. No nausea, vomiting. No diarrhea or constipation.   GENITOURINARY: No dysuria, frequency or hematuria  NEUROLOGICAL: No numbness or weakness

## 2020-03-06 ENCOUNTER — TRANSCRIPTION ENCOUNTER (OUTPATIENT)
Age: 65
End: 2020-03-06

## 2020-03-06 LAB
ALBUMIN SERPL ELPH-MCNC: 3.6 G/DL — SIGNIFICANT CHANGE UP (ref 3.3–5)
ALP SERPL-CCNC: 63 U/L — SIGNIFICANT CHANGE UP (ref 40–120)
ALT FLD-CCNC: 10 U/L — SIGNIFICANT CHANGE UP (ref 4–41)
ANION GAP SERPL CALC-SCNC: 14 MMO/L — SIGNIFICANT CHANGE UP (ref 7–14)
AST SERPL-CCNC: 6 U/L — SIGNIFICANT CHANGE UP (ref 4–40)
BILIRUB SERPL-MCNC: 0.3 MG/DL — SIGNIFICANT CHANGE UP (ref 0.2–1.2)
BUN SERPL-MCNC: 14 MG/DL — SIGNIFICANT CHANGE UP (ref 7–23)
CALCIUM SERPL-MCNC: 9.3 MG/DL — SIGNIFICANT CHANGE UP (ref 8.4–10.5)
CHLORIDE SERPL-SCNC: 101 MMOL/L — SIGNIFICANT CHANGE UP (ref 98–107)
CO2 SERPL-SCNC: 22 MMOL/L — SIGNIFICANT CHANGE UP (ref 22–31)
CREAT SERPL-MCNC: 0.81 MG/DL — SIGNIFICANT CHANGE UP (ref 0.5–1.3)
GLUCOSE SERPL-MCNC: 135 MG/DL — HIGH (ref 70–99)
HCT VFR BLD CALC: 36.3 % — LOW (ref 39–50)
HCV AB S/CO SERPL IA: 0.21 S/CO — SIGNIFICANT CHANGE UP (ref 0–0.99)
HCV AB SERPL-IMP: SIGNIFICANT CHANGE UP
HGB BLD-MCNC: 11.5 G/DL — LOW (ref 13–17)
MCHC RBC-ENTMCNC: 26.3 PG — LOW (ref 27–34)
MCHC RBC-ENTMCNC: 31.7 % — LOW (ref 32–36)
MCV RBC AUTO: 82.9 FL — SIGNIFICANT CHANGE UP (ref 80–100)
NRBC # FLD: 0 K/UL — SIGNIFICANT CHANGE UP (ref 0–0)
PLATELET # BLD AUTO: 213 K/UL — SIGNIFICANT CHANGE UP (ref 150–400)
PMV BLD: 10.3 FL — SIGNIFICANT CHANGE UP (ref 7–13)
POTASSIUM SERPL-MCNC: 4.1 MMOL/L — SIGNIFICANT CHANGE UP (ref 3.5–5.3)
POTASSIUM SERPL-SCNC: 4.1 MMOL/L — SIGNIFICANT CHANGE UP (ref 3.5–5.3)
PROT SERPL-MCNC: 7.2 G/DL — SIGNIFICANT CHANGE UP (ref 6–8.3)
RBC # BLD: 4.38 M/UL — SIGNIFICANT CHANGE UP (ref 4.2–5.8)
RBC # FLD: 18 % — HIGH (ref 10.3–14.5)
SODIUM SERPL-SCNC: 137 MMOL/L — SIGNIFICANT CHANGE UP (ref 135–145)
WBC # BLD: 8.63 K/UL — SIGNIFICANT CHANGE UP (ref 3.8–10.5)
WBC # FLD AUTO: 8.63 K/UL — SIGNIFICANT CHANGE UP (ref 3.8–10.5)

## 2020-03-06 PROCEDURE — 99222 1ST HOSP IP/OBS MODERATE 55: CPT

## 2020-03-06 PROCEDURE — 99232 SBSQ HOSP IP/OBS MODERATE 35: CPT

## 2020-03-06 PROCEDURE — 99233 SBSQ HOSP IP/OBS HIGH 50: CPT | Mod: GC

## 2020-03-06 RX ORDER — ENOXAPARIN SODIUM 100 MG/ML
40 INJECTION SUBCUTANEOUS DAILY
Refills: 0 | Status: DISCONTINUED | OUTPATIENT
Start: 2020-03-06 | End: 2020-03-10

## 2020-03-06 RX ADMIN — ENOXAPARIN SODIUM 40 MILLIGRAM(S): 100 INJECTION SUBCUTANEOUS at 09:28

## 2020-03-06 RX ADMIN — ERTAPENEM SODIUM 120 MILLIGRAM(S): 1 INJECTION, POWDER, LYOPHILIZED, FOR SOLUTION INTRAMUSCULAR; INTRAVENOUS at 12:38

## 2020-03-06 NOTE — PROGRESS NOTE ADULT - ASSESSMENT
63 yo man with h/o lymphoma, h/o lung adenocarcinoma s/p surgical resection and chemo in [facility]  presenting with fever and found to have pyuria.   ESBL E coli cultured in urine 2/2020.  Urine culture 3/5 testing.  Recurrent UTI.  Blood culture no growth x 24 h  Urine culture testing    Suggest;  -continue ertapenem 1 gm iv q 24 h  -follow final blood and urine cultures  -anticipate aaprox 7 days of antibiotic tx  (if blood culture negative)    ID service available for questions over weekend.    Farnaz Yeager MD  Pager: [phone]  After 5 PM or weekends please call fellow on call or office [phone]

## 2020-03-06 NOTE — DISCHARGE NOTE PROVIDER - NSDCCPCAREPLAN_GEN_ALL_CORE_FT
PRINCIPAL DISCHARGE DIAGNOSIS  Diagnosis: Sepsis secondary to UTI  Assessment and Plan of Treatment: UTI due to extended-spectrum beta lactamase (ESBL) producing Escherichia coli.   You came to the hospital because of high fever, chills and back pain. You were found to have an infection of the urinary tract. The urine culture showed ......... PRINCIPAL DISCHARGE DIAGNOSIS  Diagnosis: UTI due to extended-spectrum beta lactamase (ESBL) producing Escherichia coli  Assessment and Plan of Treatment: Your symptoms were diagnosed as due to a urinary tract infection for which you were treated with IV antibiotics with signficant improvement of your symptoms. Please complete your entire course of oral antibiotics as prescribed for treatment of your infection. Please follow up with your PCP within 1 week of discharge. PRINCIPAL DISCHARGE DIAGNOSIS  Diagnosis: UTI due to extended-spectrum beta lactamase (ESBL) producing Escherichia coli  Assessment and Plan of Treatment: Your symptoms were diagnosed as due to a urinary tract infection for which you were treated with IV antibiotics with signficant improvement of your symptoms. Please complete your entire course of oral antibiotics as prescribed for treatment of your infection. Please follow up with your PCP within 1 week of discharge.      SECONDARY DISCHARGE DIAGNOSES  Diagnosis: Hypertension  Assessment and Plan of Treatment: Your blood pressure medications were held during your hospitalization due to sepsis associated with your infection. Your heart rate and blood pressure remained controlled off of your blood pressure medications. You must follow up with your PCP within 1 week of discharge for continued management of your blood pressure/medication regimen after re-assessment of your heart rate/blood pressure. PRINCIPAL DISCHARGE DIAGNOSIS  Diagnosis: UTI due to extended-spectrum beta lactamase (ESBL) producing Escherichia coli  Assessment and Plan of Treatment: Your symptoms were diagnosed as due to a urinary tract infection for which you were treated with IV antibiotics with signficant improvement of your symptoms. Please complete your entire course of oral antibiotics as prescribed for treatment of your infection. Please follow up with your PCP within 1 week of discharge.      SECONDARY DISCHARGE DIAGNOSES  Diagnosis: Hypertension  Assessment and Plan of Treatment: Your blood pressure medications were held during your hospitalization due to sepsis associated with your infection. Your heart rate and blood pressure remained controlled off of your blood pressure medications. You should resume taking these medications when you get home. You must follow up with your PCP within 1 week of discharge for continued management of your blood pressure/medication regimen after re-assessment of your heart rate/blood pressure.    Diagnosis: Adenocarcinoma of lung  Assessment and Plan of Treatment: You have been diagnosed with adenocarcinoma of the lung. You are scheduled for a lymph node biopsy with Dr. Ramos. please follow up with Dr. Ramos's office for any additional preparations including which medications you should and shouldn't take prior to surgery.

## 2020-03-06 NOTE — CONSULT NOTE ADULT - SUBJECTIVE AND OBJECTIVE BOX
Patient is a 64y old  Male who presents with a chief complaint of urosepsis (06 Mar 2020 05:57)      HPI:  63 yo M smoker with hx stage 1 lung adenocarcinoma dx in 2015, s/p surgical resection & 1 cycle of chemotherapy in [facility] in 3/2019, past history of T cell lymphoma s/p 6 cycles of CHOP in 2015, presents with recurrent UTI (s/p recent treatment with antibiotics prescribed by PMD, hx ESBL 2/12/20 UCx, repeat urine culture came back negative), admitted for management of urosepsis.  Patient is originally from [facility], recently came to [place] to [place] medical care; daughter is a PA and lives in [place].   He presented to [facility] to establish care, he was found to have a R hilar lymph node which with plan for biopsy on 3/19 after cardiology clearance.   Patient presented due to fever, chills and R flank pain x 2 days. No dysuria or hematuria or increased urinary frequency. ROS otherwise unremarkable. (05 Mar 2020 14:16)     ROS: as above     PAST MEDICAL & SURGICAL HISTORY:  Hypertension  Mycosis fungoides  Stage I adenocarcinoma of lung, unspecified laterality: s/p lung resection R lung, s/p CHOP, cisplatin  S/P lobectomy of lung      SOCIAL HISTORY:    FAMILY HISTORY:      MEDICATIONS  (STANDING):  enoxaparin Injectable 40 milliGRAM(s) SubCutaneous daily  ertapenem  IVPB 1000 milliGRAM(s) IV Intermittent every 24 hours    MEDICATIONS  (PRN):      Allergies    No Known Allergies    Intolerances        Vital Signs Last 24 Hrs  T(C): 36.5 (06 Mar 2020 10:00), Max: 37 (05 Mar 2020 17:15)  T(F): 97.7 (06 Mar 2020 10:00), Max: 98.6 (05 Mar 2020 17:15)  HR: 73 (06 Mar 2020 10:00) (72 - 87)  BP: 118/74 (06 Mar 2020 10:00) (102/68 - 143/73)  BP(mean): --  RR: 17 (06 Mar 2020 10:00) (16 - 19)  SpO2: 99% (06 Mar 2020 10:00) (97% - 100%)    PHYSICAL EXAM  General: adult in NAD  HEENT: clear oropharynx, anicteric sclera, pink conjunctiva  Neck: supple  CV: normal S1/S2 with no murmur rubs or gallops  Lungs: positive air movement b/l ant lungs, clear to auscultation, no wheezes, no rales  Abdomen: soft non-tender non-distended, no hepatosplenomegaly  Ext: no clubbing cyanosis or edema  Skin: no rashes and no petechiae  Neuro: alert and oriented X 3, none focal    LABS:                          11.5   8.63  )-----------( 213      ( 06 Mar 2020 09:43 )             36.3         Mean Cell Volume : 82.9 fL  Mean Cell Hemoglobin : 26.3 pg  Mean Cell Hemoglobin Concentration : 31.7 %  Auto Neutrophil # : x  Auto Lymphocyte # : x  Auto Monocyte # : x  Auto Eosinophil # : x  Auto Basophil # : x  Auto Neutrophil % : x  Auto Lymphocyte % : x  Auto Monocyte % : x  Auto Eosinophil % : x  Auto Basophil % : x      Serial CBC's  03-06 @ 09:43  Hct-36.3 / Hgb-11.5 / Plat-213 / RBC-4.38 / WBC-8.63  Serial CBC's  03-05 @ 11:40  Hct-41.1 / Hgb-12.6 / Plat-234 / RBC-4.92 / WBC-13.26      03-06    137  |  101  |  14  ----------------------------<  135<H>  4.1   |  22  |  0.81    Ca    9.3      06 Mar 2020 09:43    TPro  7.2  /  Alb  3.6  /  TBili  0.3  /  DBili  x   /  AST  6   /  ALT  10  /  AlkPhos  63  03-06      PT/INR - ( 05 Mar 2020 11:40 )   PT: 13.5 SEC;   INR: 1.17          PTT - ( 05 Mar 2020 11:40 )  PTT:29.3 SEC                RADIOLOGY & ADDITIONAL STUDIES:

## 2020-03-06 NOTE — PROGRESS NOTE ADULT - PROBLEM SELECTOR PLAN 3
Hx HTN: BPs normal, hold lisinopril and Lopressor (home meds) given urosepsis, reassess tomorrow  Diet: regular  DVT ppx: hold if plans for LN bx per onc, re-assess tomorrow  Dispo: pending urine culture and tx of UTI Hx HTN: BPs normal, hold lisinopril and Lopressor (home meds) given urosepsis, reassess tomorrow  Diet: regular  DVT ppx: Enoxaparin 40 mg qd  Dispo: pending urine culture and tx of UTI, d/c to home, ambulating without issues

## 2020-03-06 NOTE — DISCHARGE NOTE PROVIDER - NSDCFUSCHEDAPPT_GEN_ALL_CORE_FT
SARAH DELGADO ; 03/08/2020 ; SARAH FOSTER ; 03/19/2020 ; [facility] [address]  SARAH DELGADO ; 03/19/2020 ; [facility]  SARAH DELGADO ; 04/01/2020 ; [facility] SARAH DELGADO ; 03/19/2020 ; [facility] [address]  SARAH DELGADO ; 03/19/2020 ; [facility]  SARAH DELGADO ; 04/01/2020 ; [facility]

## 2020-03-06 NOTE — DISCHARGE NOTE PROVIDER - NSDCMRMEDTOKEN_GEN_ALL_CORE_FT
lisinopril 10 mg oral tablet: 1 tab(s) orally once a day  Metoprolol Tartrate 25 mg oral tablet: 1 tab(s) orally 2 times a day  Vitamin D2 50,000 intl units (1.25 mg) oral capsule: 1 cap(s) orally once a week Vitamin D2 50,000 intl units (1.25 mg) oral capsule: 1 cap(s) orally once a week sulfamethoxazole-trimethoprim 800 mg-160 mg oral tablet: 1 tab(s) orally 2 times a day MDD:2 tablets  Vitamin D2 50,000 intl units (1.25 mg) oral capsule: 1 cap(s) orally once a week bisoprolol 5 mg oral tablet: 1 tab(s) orally once a day  lisinopril 10 mg oral tablet: 1 tab(s) orally once a day  sulfamethoxazole-trimethoprim 800 mg-160 mg oral tablet: 1 tab(s) orally every 12 hours  tamsulosin 0.4 mg oral capsule: 1 cap(s) orally once a day  Vitamin D2 50,000 intl units (1.25 mg) oral capsule: 1 cap(s) orally once a week

## 2020-03-06 NOTE — PROGRESS NOTE ADULT - PROBLEM SELECTOR PLAN 2
#Dx and tx in [facility], s/p lung resection and chemo. Not on chemo currently. Recently detected R hilar LAD.  - Oncology consulted - to see patient.  - No acute interventions currently. #Dx and tx in [facility], s/p lung resection and chemo. Not on chemo currently. Recently detected R hilar LAD.  - Oncology consulted - rec continued ABx treatment, plan for outpt bx of R hilar LN

## 2020-03-06 NOTE — PROGRESS NOTE ADULT - ASSESSMENT
65 yo M smoker with hx stage 1 lung adenocarcinoma dx in 2015, s/p surgical resection & CHOP/chemo in [facility], with reportedly new renal mass, HTN and hx mycosis fungoides who presents with recurrent UTI (s/p recent tx, hx ESBL 2/12/20 UCx), admitted for management of urosepsis, likely ESBL E. coli UTI - pending UCx.    ID consulted, c/w ertapenem 1 g q24h

## 2020-03-06 NOTE — DISCHARGE NOTE PROVIDER - HOSPITAL COURSE
63 yo M smoker with hx stage 1 lung adenocarcinoma dx in 2015, s/p surgical resection & CHOP/chemo in [facility], with reportedly new renal mass, HTN and hx mycosis fungoides who presents with recurrent UTI (s/p recent tx, hx ESBL 2/12/20 UCx), admitted for management of urosepsis, likely ESBL E. coli UTI - pending UCx.         #Urosepsis    Presented with fever, leukocytosis and urinary source of infection. Hx ESVL E. coli UTI from outpatient EMR record - with recent outpatient treatment with nitrofurantoin.  Leukocytosis downtrended ..... while on ertapenem 1 g q24h for ..... days. Urine culture showed ......        #Hx stage 1 lung adenocarcinoma    S/p chemo and lung resection in [facility]. Outpatient oncologist saw patient in hospital, recommended continued outpatient follow-up for pre-surgical testing and R hilar LN biopsy. No acute interventions .........        Patient stable for discharge with close follow-up with primary care and oncology. HPI:    65 yo M smoker with hx stage 1 lung adenocarcinoma dx in 2015, s/p surgical resection & CHOP/chemo in [facility], with reportedly new renal mass, HTN and hx mycosis fungoides who presents with recurrent UTI (s/p recent tx, hx ESBL 2/12/20 UCx), admitted for management of urosepsis, likely ESBL E. coli UTI - pending UCx.        Patient is originally from [facility], [place] medical care; daughter lives in [place]. He has a hx stage 1 lung adenocarcinoma dx in 2015, s/p surgical resection & CHOP/chemo in [facility], with reportedly a new renal mass (records pending from [facility]). He was also found on recent outpatient imaging to have a R hilar lymph node which was supposed to be biopsied this month. Pt was treated outpatient for a UTI, finished a course of antibiotics (unclear what). EMR records outpt showed ESBL E. coli on UCx 2/12/20.         Patient presented due to fever, chills and R flank pain x 2 days. No dysuria or hematuria or increased urinary frequency. ROS otherwise unremarkable. (05 Mar 2020 14:16)        Hospital course:    Patient admitted to medicine for clinical diagnosis of R pyelonephritis. Patient significantly improved on IV ertapenem per ID recs. UCx grew ESBL E coli sensitive to ertapenem and -----<insert oral antibiotics>------ which patient was transitioned to on 3/8 to complete a -----------<insert days>---------- day course as an outpatient. Patient HD stable and tolerating PO diet. Patient stable for discharge with plan for outpatient follow up. HPI:    65 yo M smoker with hx stage 1 lung adenocarcinoma dx in 2015, s/p surgical resection & CHOP/chemo in [facility], with reportedly new renal mass, HTN and hx mycosis fungoides who presents with recurrent UTI (s/p recent tx, hx ESBL 2/12/20 UCx), admitted for management of urosepsis, likely ESBL E. coli UTI - pending UCx.        Patient is originally from [facility], [place]; daughter lives in [place]. He has a hx stage 1 lung adenocarcinoma dx in 2015, s/p surgical resection & CHOP/chemo in [facility], with reportedly a new renal mass (records pending from [facility]). He was also found on recent outpatient imaging to have a R hilar lymph node which was supposed to be biopsied this month. Pt was treated outpatient for a UTI, finished a course of antibiotics (unclear what). EMR records outpt showed ESBL E. coli on UCx 2/12/20.         Patient presented due to fever, chills and R flank pain x 2 days. No dysuria or hematuria or increased urinary frequency. ROS otherwise unremarkable. (05 Mar 2020 14:16)        Hospital course:    Patient admitted to medicine for clinical diagnosis of R pyelonephritis. Patient significantly improved on IV ertapenem per ID recs. UCx grew ESBL E coli sensitive to ertapenem and bactrim. Patient was discharged on 3/10 with rx for bactrim PO DS BID through 3/14 as an outpatient. Patient HD stable and tolerating PO diet. Patient stable for discharge with plan for outpatient follow up.        Patient to undergo hilar lymph node bx w Dr. Ramos on 3/19. Per patient's daughter, patient saw cardiologist Dr. Tunde Kaplan for presurgical testing. HPI:    63 yo M smoker with hx stage 1 lung adenocarcinoma dx in 2015, s/p surgical resection & CHOP/chemo in [facility], with reportedly new renal mass, HTN and hx mycosis fungoides who presents with recurrent UTI (s/p recent tx, hx ESBL 2/12/20 UCx), admitted for management of urosepsis, likely ESBL E. coli UTI - pending UCx.        Patient is originally from [facility], [place]; daughter lives in [place]. He has a hx stage 1 lung adenocarcinoma dx in 2015, s/p surgical resection & CHOP/chemo in [facility], with reportedly a new renal mass (records pending from [facility]). He was also found on recent outpatient imaging to have a R hilar lymph node which was supposed to be biopsied this month. Pt was treated outpatient for a UTI, finished a course of antibiotics (unclear what). EMR records outpt showed ESBL E. coli on UCx 2/12/20.         Patient presented due to fever, chills and R flank pain x 2 days. No dysuria or hematuria or increased urinary frequency. ROS otherwise unremarkable. (05 Mar 2020 14:16)        Hospital course:    Patient admitted to medicine for clinical diagnosis of R pyelonephritis. Patient significantly improved on IV ertapenem per ID recs. UCx grew ESBL E coli sensitive to ertapenem and Bactrim. Patient was discharged on 3/10 with rx for bactrim PO DS BID through 3/14 as an outpatient. Patient HD stable and tolerating PO diet. Patient stable for discharge with plan for outpatient follow up.        Patient to undergo hilar lymph node bx w Dr. Ramos on 3/19. Per patient's daughter, patient saw cardiologist Dr. Tunde Kaplan for presurgical testing.

## 2020-03-06 NOTE — PROGRESS NOTE ADULT - SUBJECTIVE AND OBJECTIVE BOX
Follow Up:  complicated UTI     Inverval History/ROS:  feels better. no fever, chills, flank pain    rest of ros neg    Allergies  No Known Allergies        ANTIMICROBIALS:  ertapenem  IVPB 1000 every 24 hours      OTHER MEDS:  enoxaparin Injectable 40 milliGRAM(s) SubCutaneous daily      Vital Signs Last 24 Hrs  T(C): 36.7 (06 Mar 2020 13:43), Max: 37 (05 Mar 2020 17:15)  T(F): 98 (06 Mar 2020 13:43), Max: 98.6 (05 Mar 2020 17:15)  HR: 76 (06 Mar 2020 13:43) (72 - 81)  BP: 132/76 (06 Mar 2020 13:43) (102/68 - 132/76)  BP(mean): --  RR: 18 (06 Mar 2020 13:43) (17 - 19)  SpO2: 98% (06 Mar 2020 13:43) (97% - 100%)    PHYSICAL EXAM:  General: [x ] non-toxic  HEAD/EYES: [ ] PERRL [ x] white sclera [ ] icterus  ENT:  [ ] normal [x ] supple [ ] thrush [ ] pharyngeal exudate  Cardiovascular:   [ ] murmur [x ] normal [ ] PPM/AICD  Respiratory:  [x ] clear to ausculation bilaterally  GI:  [x ] soft, non-tender, normal bowel sounds  :  [ ] martinez [x ] no CVA tenderness   Musculoskeletal:  [x ] no synovitis  Neurologic:  [ x] non-focal exam   Skin:  chest erythema  Lymph: [ ] no lymphadenopathy  Psychiatric:  [x ] appropriate affect [x ] alert & oriented  Lines:  [x ] no phlebitis [ ] central line                                11.5   8.63  )-----------( 213      ( 06 Mar 2020 09:43 )             36.3       03-06    137  |  101  |  14  ----------------------------<  135<H>  4.1   |  22  |  0.81    Ca    9.3      06 Mar 2020 09:43    TPro  7.2  /  Alb  3.6  /  TBili  0.3  /  DBili  x   /  AST  6   /  ALT  10  /  AlkPhos  63  03-06      Urinalysis Basic - ( 05 Mar 2020 11:20 )    Color: YELLOW / Appearance: CLEAR / S.026 / pH: 6.0  Gluc: NEGATIVE / Ketone: NEGATIVE  / Bili: NEGATIVE / Urobili: NORMAL   Blood: MODERATE / Protein: 50 / Nitrite: POSITIVE   Leuk Esterase: LARGE / RBC: 6-10 / WBC >50   Sq Epi: OCC / Non Sq Epi: x / Bacteria: MANY        MICROBIOLOGY:  Culture - Blood (20 @ 14:17)    Specimen Source: .Blood Blood-Venous    Culture Results:   No growth to date.    Culture - Blood (20 @ 14:17)    Specimen Source: .Blood Blood    Culture Results:   No growth to date.        RADIOLOGY:    < from: Xray Chest 2 Views PA/Lat (20 @ 12:42) >  EXAM:  Frontal and lateral chest from 3/5/2020 at 1242. Reviewed in conjunction with chest CT from 2020.     IMPRESSION:  Low lung volumes.    Right hilar region surgical chain staples with adjacent obliquely oriented curvilinear opacity compatible with scarring. Clear remaining visualized lungs. No pleural effusions or pneumothorax.    Stable cardiac and mediastinal silhouettes.     Trachea midline.    Redemonstrated old anterolateral right 5th rib fracture deformity. Spinal degenerative changes again noted.    < end of copied text >  < from: CT Abdomen and Pelvis w/ Oral Cont and w/ IV Cont (20 @ 14:12) >  IMPRESSION:     Enlarged right hilar lymph node unchanged.  Apparent right middle lobectomy with postsurgical scarring.  Bilateral small indeterminant adrenal nodule is unchanged.    < end of copied text >

## 2020-03-06 NOTE — DISCHARGE NOTE PROVIDER - PROVIDER TOKENS
FREE:[LAST:[Podval],FIRST:[Heidy],PHONE:[phone]],FAX:[(   )    -],FOLLOWUP:[1 week],ESTABLISHEDPATIENT:[T]]

## 2020-03-06 NOTE — PROGRESS NOTE ADULT - SUBJECTIVE AND OBJECTIVE BOX
Rachel Romero M.D.  Beeper: [phone] ([facility])/ [id number] ([facility])     INTERNAL MEDICINE PROGRESS NOTE    Patient is a 64y old  Male who presents with a chief complaint of UTI, hx ESBL (05 Mar 2020 19:16)      Overnight events:  Subjective:    Medications:  MEDICATIONS  (STANDING):  ertapenem  IVPB 1000 milliGRAM(s) IV Intermittent every 24 hours    MEDICATIONS  (PRN):      Objective:    Vitals: Vital Signs Last 24 Hrs  T(C): 36.9 (20 @ 20:05), Max: 38.3 (20 @ 10:35)  T(F): 98.5 (20 @ 20:05), Max: 100.9 (20 @ 10:35)  HR: 81 (20 @ 20:05) (76 - 91)  BP: 121/72 (20 @ 20:05) (102/68 - 143/73)  BP(mean): --  RR: 19 (20 @ 20:05) (16 - 20)  SpO2: 100% (20 @ 20:05) (98% - 100%)              I&O's Summary      PHYSICAL EXAM:  GENERAL: NAD, conversant  CHEST/LUNG: Clear to auscultation bilaterally; No crackles, rhonchi, wheezing, or rubs  HEART: Regular rate and rhythm; No murmurs, rubs, or gallops  ABDOMEN: Soft, Nontender, Nondistended; Bowel sounds present  EXTREMITIES:  2+ Peripheral Pulses, No clubbing, cyanosis, or edema  SKIN: No rashes or lesions  NERVOUS SYSTEM:  Alert & Oriented, Good concentration                                                                                    LABS:      134<L>  |  99  |  13  ----------------------------<  109<H>  4.7   |  21<L>  |  0.83    Ca    9.5      05 Mar 2020 11:40    TPro  7.7  /  Alb  3.9  /  TBili  0.4  /  DBili  x   /  AST  12  /  ALT  12  /  AlkPhos  73        PT/INR - ( 05 Mar 2020 11:40 )   PT: 13.5 SEC;   INR: 1.17          PTT - ( 05 Mar 2020 11:40 )  PTT:29.3 SEC                Urinalysis Basic - ( 05 Mar 2020 11:20 )    Color: YELLOW / Appearance: CLEAR / S.026 / pH: 6.0  Gluc: NEGATIVE / Ketone: NEGATIVE  / Bili: NEGATIVE / Urobili: NORMAL   Blood: MODERATE / Protein: 50 / Nitrite: POSITIVE   Leuk Esterase: LARGE / RBC: 6-10 / WBC >50   Sq Epi: OCC / Non Sq Epi: x / Bacteria: MANY                                  12.6   13.26 )-----------( 234      ( 05 Mar 2020 11:40 )             41.1     CAPILLARY BLOOD GLUCOSE            RECENT CULTURES:     RADIOLOGY & ADDITIONAL TESTS: N  Consultants involved in case: N Rachel Romero M.D.  Beeper: [phone] ([facility])/ [phone] ([facility])     INTERNAL MEDICINE PROGRESS NOTE    Patient is a 64y old  Male who presents with a chief complaint of UTI, hx ESBL (05 Mar 2020 19:16)    Overnight events: no acute events  Subjective: no acute complaints    Medications:  MEDICATIONS  (STANDING):  ertapenem  IVPB 1000 milliGRAM(s) IV Intermittent every 24 hours    MEDICATIONS  (PRN):    Objective:    Vitals: Vital Signs Last 24 Hrs  T(C): 36.9 (-05-20 @ 20:05), Max: 38.3 (03-05-20 @ 10:35)  T(F): 98.5 (-05-20 @ 20:05), Max: 100.9 (-05-20 @ 10:35)  HR: 81 (-05-20 @ 20:05) (76 - 91)  BP: 121/72 (-05-20 @ 20:05) (102/68 - 143/73)  BP(mean): --  RR: 19 (05-20 @ 20:05) (16 - 20)  SpO2: 100% (05-20 @ 20:05) (98% - 100%)                PHYSICAL EXAM:  GENERAL: NAD, conversant  CHEST/LUNG: Clear to auscultation bilaterally; No crackles, rhonchi, wheezing, or rubs  HEART: Regular rate and rhythm; No murmurs, rubs, or gallops  ABDOMEN: Soft, Nontender, Nondistended; Bowel sounds present  EXTREMITIES:  2+ Peripheral Pulses, No clubbing, cyanosis, or edema  SKIN: No rashes or lesions  NERVOUS SYSTEM:  Alert & Oriented, Good concentration                                                                                  LABS:  Rachel Romero M.D.  Beeper: [phone] ([facility])/ [phone] ([facility])     INTERNAL MEDICINE PROGRESS NOTE    Patient is a 64y old  Male who presents with a chief complaint of urosepsis (06 Mar 2020 12:42)      Overnight events:  Subjective:    Medications:  MEDICATIONS  (STANDING):  enoxaparin Injectable 40 milliGRAM(s) SubCutaneous daily  ertapenem  IVPB 1000 milliGRAM(s) IV Intermittent every 24 hours    MEDICATIONS  (PRN):      Objective:    Vitals: Vital Signs Last 24 Hrs  T(C): 36.7 (20 @ 13:43), Max: 37 (20 @ 17:15)  T(F): 98 (20 @ 13:43), Max: 98.6 (20 @ 17:15)  HR: 76 (20 @ 13:43) (72 - 81)  BP: 132/76 (20 @ 13:43) (102/68 - 132/76)  BP(mean): --  RR: 18 (20 @ 13:43) (17 - 19)  SpO2: 98% (20 @ 13:43) (97% - 100%)              I&O's Summary      PHYSICAL EXAM:  GENERAL: NAD, conversant  CHEST/LUNG: Clear to auscultation bilaterally; No crackles, rhonchi, wheezing, or rubs  HEART: Regular rate and rhythm; No murmurs, rubs, or gallops  ABDOMEN: Soft, Nontender, Nondistended; Bowel sounds present  EXTREMITIES:  2+ Peripheral Pulses, No clubbing, cyanosis, or edema  SKIN: No rashes or lesions  NERVOUS SYSTEM:  Alert & Oriented, Good concentration                                                                                    LABS:      137  |  101  |  14  ----------------------------<  135<H>  4.1   |  22  |  0.81      134<L>  |  99  |  13  ----------------------------<  109<H>  4.7   |  21<L>  |  0.83    Ca    9.3      06 Mar 2020 09:43  Ca    9.5      05 Mar 2020 11:40    TPro  7.2  /  Alb  3.6  /  TBili  0.3  /  DBili  x   /  AST  6   /  ALT  10  /  AlkPhos  63  -06  TPro  7.7  /  Alb  3.9  /  TBili  0.4  /  DBili  x   /  AST  12  /  ALT  12  /  AlkPhos  73  03-05      PT/INR - ( 05 Mar 2020 11:40 )   PT: 13.5 SEC;   INR: 1.17          PTT - ( 05 Mar 2020 11:40 )  PTT:29.3 SEC                Urinalysis Basic - ( 05 Mar 2020 11:20 )    Color: YELLOW / Appearance: CLEAR / S.026 / pH: 6.0  Gluc: NEGATIVE / Ketone: NEGATIVE  / Bili: NEGATIVE / Urobili: NORMAL   Blood: MODERATE / Protein: 50 / Nitrite: POSITIVE   Leuk Esterase: LARGE / RBC: 6-10 / WBC >50   Sq Epi: OCC / Non Sq Epi: x / Bacteria: MANY                                  11.5   8.63  )-----------( 213      ( 06 Mar 2020 09:43 )             36.3                         12.6   13.26 )-----------( 234      ( 05 Mar 2020 11:40 )             41.1     CAPILLARY BLOOD GLUCOSE: N  RECENT CULTURES: UCx pending  RADIOLOGY & ADDITIONAL TESTS: N  Consultants involved in case: onc, ID

## 2020-03-06 NOTE — PROGRESS NOTE ADULT - PROBLEM SELECTOR PLAN 1
#Hx recently treated UTI, outpt UCx 2/12/20 with ESBL E. coli. p/w fever, chills and flank pain. A/f complicated UTI with resistant organism --> urosepsis (fever, leukocytosis and urinary source). Low c/f pyelonephritis given lack of CVA tenderness and nontoxic appearance/labs. #Hx recently treated UTI, outpt UCx 2/12/20 with ESBL E. coli. p/w fever, chills and flank pain. A/f complicated UTI with resistant organism --> urosepsis (fever, leukocytosis and urinary source). Low c/f pyelonephritis given lack of CVA tenderness and nontoxic appearance/labs.  - c/w ertapenem 1 g q24h (3/5-)  - F/u UCx sent 3/5

## 2020-03-06 NOTE — CONSULT NOTE ADULT - ASSESSMENT
65 yo M smoker with hx stage 1 lung adenocarcinoma dx in 2015, s/p surgical resection & 1 cycle of chemotherapy in [facility] in 3/2019, past history of T cell lymphoma s/p 6 cycles of CHOP in 2015, presents with recurrent UTI (s/p recent treatment with antibiotics prescribed by PMD, hx ESBL 2/12/20 UCx, repeat urine culture came back negative), admitted for management of urosepsis.    ESBL UTI 2/2020, now with recurrence s/p course of PO antibiotics as outpatient by PMD. Per Daughter who is a PA, the prescribed abx was nitrofurantoin.     -Appreciate ID input  -C/w abx, follow infectious work up  -Concern for disease recurrence in the enlarged hilar node, plan is for hilar node bx by Dr Ramos on 3/19. He needs cardiology clearance prior to bx.   -  -  -Supportive care, pain control, Nutrition, PT, DVT ppx  -Outpatient oncology f/u    Will follow. Please do not hesitate to call back with questions.     Neeta Pinto MD  Medical Oncology Attending  C: [phone] 65 yo M smoker with hx stage 1 lung adenocarcinoma dx in 2015, s/p surgical resection & 1 cycle of chemotherapy in [facility] in 3/2019, past history of T cell lymphoma s/p 6 cycles of CHOP in 2015, presents with recurrent UTI (s/p recent treatment with antibiotics prescribed by PMD, hx ESBL 2/12/20 UCx, repeat urine culture came back negative), admitted for management of urosepsis.    ESBL UTI 2/2020, now with recurrence s/p course of PO antibiotics as outpatient by PMD. Per Daughter who is a PA, the prescribed abx was nitrofurantoin.     -Appreciate ID input  -C/w abx, follow infectious work up  -Concern for disease recurrence in the enlarged hilar node, plan is for hilar node bx by Dr Ramos on 3/19.    -He needs cardiology clearance prior to bx which is scheduled on 3/19. Please ensure that the cardiology clearance is obtained as inpatient if possible, or after discharge.  -Supportive care, pain control, Nutrition, PT, DVT ppx  -Outpatient oncology f/u    Will follow. Please do not hesitate to call back with questions.     Neeta Pinto MD  Medical Oncology Attending  C: [phone]

## 2020-03-07 LAB
-  AMIKACIN: SIGNIFICANT CHANGE UP
-  AMPICILLIN/SULBACTAM: SIGNIFICANT CHANGE UP
-  AMPICILLIN: SIGNIFICANT CHANGE UP
-  AZTREONAM: SIGNIFICANT CHANGE UP
-  CEFAZOLIN: SIGNIFICANT CHANGE UP
-  CEFEPIME: SIGNIFICANT CHANGE UP
-  CEFOXITIN: SIGNIFICANT CHANGE UP
-  CEFTRIAXONE: SIGNIFICANT CHANGE UP
-  CIPROFLOXACIN: SIGNIFICANT CHANGE UP
-  ERTAPENEM: SIGNIFICANT CHANGE UP
-  GENTAMICIN: SIGNIFICANT CHANGE UP
-  IMIPENEM: SIGNIFICANT CHANGE UP
-  LEVOFLOXACIN: SIGNIFICANT CHANGE UP
-  MEROPENEM: SIGNIFICANT CHANGE UP
-  NITROFURANTOIN: SIGNIFICANT CHANGE UP
-  PIPERACILLIN/TAZOBACTAM: SIGNIFICANT CHANGE UP
-  TIGECYCLINE: SIGNIFICANT CHANGE UP
-  TOBRAMYCIN: SIGNIFICANT CHANGE UP
-  TRIMETHOPRIM/SULFAMETHOXAZOLE: SIGNIFICANT CHANGE UP
ANION GAP SERPL CALC-SCNC: 13 MMO/L — SIGNIFICANT CHANGE UP (ref 7–14)
BUN SERPL-MCNC: 16 MG/DL — SIGNIFICANT CHANGE UP (ref 7–23)
CALCIUM SERPL-MCNC: 9.6 MG/DL — SIGNIFICANT CHANGE UP (ref 8.4–10.5)
CHLORIDE SERPL-SCNC: 100 MMOL/L — SIGNIFICANT CHANGE UP (ref 98–107)
CO2 SERPL-SCNC: 25 MMOL/L — SIGNIFICANT CHANGE UP (ref 22–31)
CREAT SERPL-MCNC: 0.78 MG/DL — SIGNIFICANT CHANGE UP (ref 0.5–1.3)
CULTURE RESULTS: SIGNIFICANT CHANGE UP
GLUCOSE SERPL-MCNC: 106 MG/DL — HIGH (ref 70–99)
HCT VFR BLD CALC: 40.1 % — SIGNIFICANT CHANGE UP (ref 39–50)
HGB BLD-MCNC: 12.1 G/DL — LOW (ref 13–17)
MCHC RBC-ENTMCNC: 25.6 PG — LOW (ref 27–34)
MCHC RBC-ENTMCNC: 30.2 % — LOW (ref 32–36)
MCV RBC AUTO: 85 FL — SIGNIFICANT CHANGE UP (ref 80–100)
METHOD TYPE: SIGNIFICANT CHANGE UP
NRBC # FLD: 0 K/UL — SIGNIFICANT CHANGE UP (ref 0–0)
ORGANISM # SPEC MICROSCOPIC CNT: SIGNIFICANT CHANGE UP
ORGANISM # SPEC MICROSCOPIC CNT: SIGNIFICANT CHANGE UP
PLATELET # BLD AUTO: 224 K/UL — SIGNIFICANT CHANGE UP (ref 150–400)
PMV BLD: 9.8 FL — SIGNIFICANT CHANGE UP (ref 7–13)
POTASSIUM SERPL-MCNC: 4.8 MMOL/L — SIGNIFICANT CHANGE UP (ref 3.5–5.3)
POTASSIUM SERPL-SCNC: 4.8 MMOL/L — SIGNIFICANT CHANGE UP (ref 3.5–5.3)
RBC # BLD: 4.72 M/UL — SIGNIFICANT CHANGE UP (ref 4.2–5.8)
RBC # FLD: 18 % — HIGH (ref 10.3–14.5)
SODIUM SERPL-SCNC: 138 MMOL/L — SIGNIFICANT CHANGE UP (ref 135–145)
SPECIMEN SOURCE: SIGNIFICANT CHANGE UP
WBC # BLD: 6.52 K/UL — SIGNIFICANT CHANGE UP (ref 3.8–10.5)
WBC # FLD AUTO: 6.52 K/UL — SIGNIFICANT CHANGE UP (ref 3.8–10.5)

## 2020-03-07 PROCEDURE — 99233 SBSQ HOSP IP/OBS HIGH 50: CPT | Mod: GC

## 2020-03-07 RX ADMIN — ENOXAPARIN SODIUM 40 MILLIGRAM(S): 100 INJECTION SUBCUTANEOUS at 12:38

## 2020-03-07 RX ADMIN — ERTAPENEM SODIUM 120 MILLIGRAM(S): 1 INJECTION, POWDER, LYOPHILIZED, FOR SOLUTION INTRAMUSCULAR; INTRAVENOUS at 12:37

## 2020-03-07 NOTE — PROGRESS NOTE ADULT - PROBLEM SELECTOR PLAN 3
Hx HTN: BPs normal, hold lisinopril and Lopressor (home meds) given urosepsis, reassess tomorrow  Diet: regular  DVT ppx: Enoxaparin 40 mg qd  Dispo: pending urine culture and tx of UTI, d/c to home, ambulating without issues

## 2020-03-07 NOTE — PROGRESS NOTE ADULT - ASSESSMENT
63 yo M smoker with hx stage 1 lung adenocarcinoma dx in 2015, s/p surgical resection & CHOP/chemo in [facility], with reportedly new renal mass, HTN and hx mycosis fungoides who presents with recurrent UTI (s/p recent tx, hx ESBL 2/12/20 UCx), admitted for management of urosepsis, likely ESBL E. coli UTI - pending UCx.    ID consulted, c/w ertapenem 1 g q24h 63 yo M smoker with hx stage 1 lung adenocarcinoma dx in 2015, s/p surgical resection & CHOP/chemo in [facility], with reportedly new renal mass, HTN and hx mycosis fungoides who presents with recurrent UTI (s/p recent tx, hx ESBL 2/12/20 UCx), admitted for management of urosepsis, likely ESBL E. coli UTI - pending UCx.    ID consulted, c/w ertapenem 1 g q24h, 3/5- (DAY 3)

## 2020-03-07 NOTE — PROGRESS NOTE ADULT - PROBLEM SELECTOR PLAN 1
#Hx recently treated UTI, outpt UCx 2/12/20 with ESBL E. coli. p/w fever, chills and flank pain. A/f complicated UTI with resistant organism --> urosepsis (fever, leukocytosis and urinary source). Low c/f pyelonephritis given lack of CVA tenderness and nontoxic appearance/labs.  - c/w ertapenem 1 g q24h (3/5-)  - F/u UCx sent 3/5 #Hx recently treated UTI, outpt UCx 2/12/20 with ESBL E. coli. p/w fever, chills and flank pain. A/f complicated UTI with resistant organism --> urosepsis (fever, leukocytosis and urinary source). Low c/f pyelonephritis given lack of CVA tenderness and nontoxic appearance/labs.  - c/w ertapenem 1 g q24h (3/5-) DAY 3  - F/u UCx sent 3/5 --> GNR --> f/u speciation and sensitivities

## 2020-03-07 NOTE — PROGRESS NOTE ADULT - PROBLEM SELECTOR PLAN 2
#Dx and tx in [facility], s/p lung resection and chemo. Not on chemo currently. Recently detected R hilar LAD.  - Oncology consulted - rec continued ABx treatment, plan for outpt bx of R hilar LN

## 2020-03-07 NOTE — PROGRESS NOTE ADULT - SUBJECTIVE AND OBJECTIVE BOX
Rachel Romero M.D.  Beeper: [phone] ([facility])/ [phone] ([facility])     INTERNAL MEDICINE PROGRESS NOTE    Patient is a 64y old  Male who presents with a chief complaint of urosepsis (06 Mar 2020 18:15)      Overnight events:  Subjective:    Medications:  MEDICATIONS  (STANDING):  enoxaparin Injectable 40 milliGRAM(s) SubCutaneous daily  ertapenem  IVPB 1000 milliGRAM(s) IV Intermittent every 24 hours    MEDICATIONS  (PRN):      Objective:    Vitals: Vital Signs Last 24 Hrs  T(C): 36.6 (20 @ 05:52), Max: 36.7 (20 @ 06:40)  T(F): 97.8 (20 @ 05:52), Max: 98 (20 @ 06:40)  HR: 74 (20 @ 05:52) (70 - 76)  BP: 141/87 (20 @ 05:52) (118/74 - 141/87)  BP(mean): --  RR: 17 (20 @ 05:52) (17 - 18)  SpO2: 98% (20 @ 05:52) (97% - 99%)              I&O's Summary    06 Mar 2020 07:01  -  07 Mar 2020 06:09  --------------------------------------------------------  IN: 800 mL / OUT: 700 mL / NET: 100 mL        PHYSICAL EXAM:  GENERAL: NAD, conversant  CHEST/LUNG: Clear to auscultation bilaterally; No crackles, rhonchi, wheezing, or rubs  HEART: Regular rate and rhythm; No murmurs, rubs, or gallops  ABDOMEN: Soft, Nontender, Nondistended; Bowel sounds present  EXTREMITIES:  2+ Peripheral Pulses, No clubbing, cyanosis, or edema  SKIN: No rashes or lesions  NERVOUS SYSTEM:  Alert & Oriented, Good concentration                                                                                    LABS:      137  |  101  |  14  ----------------------------<  135<H>  4.1   |  22  |  0.81      134<L>  |  99  |  13  ----------------------------<  109<H>  4.7   |  21<L>  |  0.83    Ca    9.3      06 Mar 2020 09:43  Ca    9.5      05 Mar 2020 11:40    TPro  7.2  /  Alb  3.6  /  TBili  0.3  /  DBili  x   /  AST  6   /  ALT  10  /  AlkPhos  63  03-06  TPro  7.7  /  Alb  3.9  /  TBili  0.4  /  DBili  x   /  AST  12  /  ALT  12  /  AlkPhos  73  03-05      PT/INR - ( 05 Mar 2020 11:40 )   PT: 13.5 SEC;   INR: 1.17          PTT - ( 05 Mar 2020 11:40 )  PTT:29.3 SEC                Urinalysis Basic - ( 05 Mar 2020 11:20 )    Color: YELLOW / Appearance: CLEAR / S.026 / pH: 6.0  Gluc: NEGATIVE / Ketone: NEGATIVE  / Bili: NEGATIVE / Urobili: NORMAL   Blood: MODERATE / Protein: 50 / Nitrite: POSITIVE   Leuk Esterase: LARGE / RBC: 6-10 / WBC >50   Sq Epi: OCC / Non Sq Epi: x / Bacteria: MANY                                  11.5   8.63  )-----------( 213      ( 06 Mar 2020 09:43 )             36.3                         12.6   13.26 )-----------( 234      ( 05 Mar 2020 11:40 )             41.1     CAPILLARY BLOOD GLUCOSE            RECENT CULTURES:   @ 17:06  .Urine Clean Catch (Midstream)  --  --  --    >100,000 CFU/ml Gram Negative Rods  --   @ 14:17  .Blood Blood  --  --  --    No growth to date.  --     RADIOLOGY & ADDITIONAL TESTS: N  Consultants involved in case: N Rachel Romero M.D.  Beeper: [phone] ([facility])/ [phone] ([facility])     INTERNAL MEDICINE PROGRESS NOTE    Patient is a 64y old  Male who presents with a chief complaint of urosepsis (06 Mar 2020 18:15)    Overnight events: no acute events  Subjective: no acute complaints, no fever or dysuria.    Medications:  MEDICATIONS  (STANDING):  enoxaparin Injectable 40 milliGRAM(s) SubCutaneous daily  ertapenem  IVPB 1000 milliGRAM(s) IV Intermittent every 24 hours    MEDICATIONS  (PRN):    Objective:    Vitals: Vital Signs Last 24 Hrs  T(C): 36.6 (20 @ 05:52), Max: 36.7 (20 @ 06:40)  T(F): 97.8 (20 @ 05:52), Max: 98 (20 @ 06:40)  HR: 74 (20 @ 05:52) (70 - 76)  BP: 141/87 (20 @ 05:52) (118/74 - 141/87)  BP(mean): --  RR: 17 (20 @ 05:52) (17 - 18)  SpO2: 98% (20 @ 05:52) (97% - 99%)                I&O's Summary  06 Mar 2020 07:01  -  07 Mar 2020 06:09  --------------------------------------------------------  IN: 800 mL / OUT: 700 mL / NET: 100 mL    PHYSICAL EXAM:  GENERAL: NAD, conversant  CHEST/LUNG: Clear to auscultation bilaterally; No crackles, rhonchi, wheezing, or rubs  HEART: Regular rate and rhythm; No murmurs, rubs, or gallops  ABDOMEN: Soft, Nontender, Nondistended; Bowel sounds present  No CVA tenderness.  EXTREMITIES:  2+ Peripheral Pulses, No clubbing, cyanosis, or edema  SKIN: No rashes or lesions  NERVOUS SYSTEM:  Alert & Oriented, Good concentration                                                                                  LABS:      138  |  100  |  16  ----------------------------<  106<H>  4.8   |  25  |  0.78  03-06    137  |  101  |  14  ----------------------------<  135<H>  4.1   |  22  |  0.81  03-05    134<L>  |  99  |  13  ----------------------------<  109<H>  4.7   |  21<L>  |  0.83    Ca    9.6      07 Mar 2020 06:45  Ca    9.3      06 Mar 2020 09:43  Ca    9.5      05 Mar 2020 11:40    TPro  7.2  /  Alb  3.6  /  TBili  0.3  /  DBili  x   /  AST  6   /  ALT  10  /  AlkPhos  63  03-06  TPro  7.7  /  Alb  3.9  /  TBili  0.4  /  DBili  x   /  AST  12  /  ALT  12  /  AlkPhos  73  03-05      PT/INR - ( 05 Mar 2020 11:40 )   PT: 13.5 SEC;   INR: 1.17          PTT - ( 05 Mar 2020 11:40 )  PTT:29.3 SEC              Urinalysis Basic - ( 05 Mar 2020 11:20 )    Color: YELLOW / Appearance: CLEAR / S.026 / pH: 6.0  Gluc: NEGATIVE / Ketone: NEGATIVE  / Bili: NEGATIVE / Urobili: NORMAL   Blood: MODERATE / Protein: 50 / Nitrite: POSITIVE   Leuk Esterase: LARGE / RBC: 6-10 / WBC >50   Sq Epi: OCC / Non Sq Epi: x / Bacteria: MANY                     12.1   6.52  )-----------( 224      ( 07 Mar 2020 06:45 )             40.1                         11.5   8.63  )-----------( 213      ( 06 Mar 2020 09:43 )             36.3                         12.6   13.26 )-----------( 234      ( 05 Mar 2020 11:40 )             41.1     CAPILLARY BLOOD GLUCOSE    RECENT CULTURES:   @ 17:06  .Urine Clean Catch (Midstream)  --  --  --    >100,000 CFU/ml Gram Negative Rods  --   @ 14:17  .Blood Blood  --  --  --    No growth to date.  --     RADIOLOGY & ADDITIONAL TESTS: N  Consultants involved in case: onc Rachel Romero M.D.  Beeper: [phone] ([facility])/ [phone] ([facility])     INTERNAL MEDICINE PROGRESS NOTE    Patient is a 64y old  Male who presents with a chief complaint of urosepsis (06 Mar 2020 18:15)    Overnight events: no acute events  Subjective: no acute complaints, no fever or dysuria. No n/v/d.    Medications:  MEDICATIONS  (STANDING):  enoxaparin Injectable 40 milliGRAM(s) SubCutaneous daily  ertapenem  IVPB 1000 milliGRAM(s) IV Intermittent every 24 hours    MEDICATIONS  (PRN):    Objective:    Vitals: Vital Signs Last 24 Hrs  T(C): 36.6 (20 @ 05:52), Max: 36.7 (20 @ 06:40)  T(F): 97.8 (20 @ 05:52), Max: 98 (20 @ 06:40)  HR: 74 (20 @ 05:52) (70 - 76)  BP: 141/87 (20 @ 05:52) (118/74 - 141/87)  BP(mean): --  RR: 17 (20 @ 05:52) (17 - 18)  SpO2: 98% (20 @ 05:52) (97% - 99%)                I&O's Summary  06 Mar 2020 07:01  -  07 Mar 2020 06:09  --------------------------------------------------------  IN: 800 mL / OUT: 700 mL / NET: 100 mL    PHYSICAL EXAM:  GENERAL: NAD, conversant  CHEST/LUNG: Clear to auscultation bilaterally; No crackles, rhonchi, wheezing, or rubs  HEART: Regular rate and rhythm; No murmurs, rubs, or gallops  ABDOMEN: Soft, Nontender, Nondistended; Bowel sounds present  No CVA tenderness.  EXTREMITIES:  2+ Peripheral Pulses, No clubbing, cyanosis, or edema  SKIN: No rashes or lesions  NERVOUS SYSTEM:  Alert & Oriented, Good concentration                                                                                  LABS:      138  |  100  |  16  ----------------------------<  106<H>  4.8   |  25  |  0.78  03-06    137  |  101  |  14  ----------------------------<  135<H>  4.1   |  22  |  0.81  03-05    134<L>  |  99  |  13  ----------------------------<  109<H>  4.7   |  21<L>  |  0.83    Ca    9.6      07 Mar 2020 06:45  Ca    9.3      06 Mar 2020 09:43  Ca    9.5      05 Mar 2020 11:40    TPro  7.2  /  Alb  3.6  /  TBili  0.3  /  DBili  x   /  AST  6   /  ALT  10  /  AlkPhos  63  03-06  TPro  7.7  /  Alb  3.9  /  TBili  0.4  /  DBili  x   /  AST  12  /  ALT  12  /  AlkPhos  73  03-05      PT/INR - ( 05 Mar 2020 11:40 )   PT: 13.5 SEC;   INR: 1.17          PTT - ( 05 Mar 2020 11:40 )  PTT:29.3 SEC              Urinalysis Basic - ( 05 Mar 2020 11:20 )    Color: YELLOW / Appearance: CLEAR / S.026 / pH: 6.0  Gluc: NEGATIVE / Ketone: NEGATIVE  / Bili: NEGATIVE / Urobili: NORMAL   Blood: MODERATE / Protein: 50 / Nitrite: POSITIVE   Leuk Esterase: LARGE / RBC: 6-10 / WBC >50   Sq Epi: OCC / Non Sq Epi: x / Bacteria: MANY                     12.1   6.52  )-----------( 224      ( 07 Mar 2020 06:45 )             40.1                         11.5   8.63  )-----------( 213      ( 06 Mar 2020 09:43 )             36.3                         12.6   13.26 )-----------( 234      ( 05 Mar 2020 11:40 )             41.1     CAPILLARY BLOOD GLUCOSE    RECENT CULTURES:   @ 17:06  .Urine Clean Catch (Midstream)  --  --  --    >100,000 CFU/ml Gram Negative Rods  --   @ 14:17  .Blood Blood  --  --  --    No growth to date.  --     RADIOLOGY & ADDITIONAL TESTS: N  Consultants involved in case: onc

## 2020-03-08 LAB
ANION GAP SERPL CALC-SCNC: 17 MMO/L — HIGH (ref 7–14)
BUN SERPL-MCNC: 14 MG/DL — SIGNIFICANT CHANGE UP (ref 7–23)
CALCIUM SERPL-MCNC: 9.6 MG/DL — SIGNIFICANT CHANGE UP (ref 8.4–10.5)
CHLORIDE SERPL-SCNC: 99 MMOL/L — SIGNIFICANT CHANGE UP (ref 98–107)
CO2 SERPL-SCNC: 22 MMOL/L — SIGNIFICANT CHANGE UP (ref 22–31)
CREAT SERPL-MCNC: 0.81 MG/DL — SIGNIFICANT CHANGE UP (ref 0.5–1.3)
GLUCOSE SERPL-MCNC: 104 MG/DL — HIGH (ref 70–99)
HCT VFR BLD CALC: 41 % — SIGNIFICANT CHANGE UP (ref 39–50)
HGB BLD-MCNC: 12.5 G/DL — LOW (ref 13–17)
MCHC RBC-ENTMCNC: 25.7 PG — LOW (ref 27–34)
MCHC RBC-ENTMCNC: 30.5 % — LOW (ref 32–36)
MCV RBC AUTO: 84.2 FL — SIGNIFICANT CHANGE UP (ref 80–100)
NRBC # FLD: 0 K/UL — SIGNIFICANT CHANGE UP (ref 0–0)
PLATELET # BLD AUTO: 194 K/UL — SIGNIFICANT CHANGE UP (ref 150–400)
PMV BLD: 11.2 FL — SIGNIFICANT CHANGE UP (ref 7–13)
POTASSIUM SERPL-MCNC: 4.7 MMOL/L — SIGNIFICANT CHANGE UP (ref 3.5–5.3)
POTASSIUM SERPL-SCNC: 4.7 MMOL/L — SIGNIFICANT CHANGE UP (ref 3.5–5.3)
RBC # BLD: 4.87 M/UL — SIGNIFICANT CHANGE UP (ref 4.2–5.8)
RBC # FLD: 17.6 % — HIGH (ref 10.3–14.5)
SODIUM SERPL-SCNC: 138 MMOL/L — SIGNIFICANT CHANGE UP (ref 135–145)
WBC # BLD: 6.51 K/UL — SIGNIFICANT CHANGE UP (ref 3.8–10.5)
WBC # FLD AUTO: 6.51 K/UL — SIGNIFICANT CHANGE UP (ref 3.8–10.5)

## 2020-03-08 PROCEDURE — 99233 SBSQ HOSP IP/OBS HIGH 50: CPT | Mod: GC

## 2020-03-08 RX ORDER — METOPROLOL TARTRATE 50 MG
1 TABLET ORAL
Qty: 0 | Refills: 0 | DISCHARGE

## 2020-03-08 RX ORDER — LISINOPRIL 2.5 MG/1
1 TABLET ORAL
Qty: 0 | Refills: 0 | DISCHARGE

## 2020-03-08 RX ADMIN — ERTAPENEM SODIUM 120 MILLIGRAM(S): 1 INJECTION, POWDER, LYOPHILIZED, FOR SOLUTION INTRAMUSCULAR; INTRAVENOUS at 13:32

## 2020-03-08 RX ADMIN — ENOXAPARIN SODIUM 40 MILLIGRAM(S): 100 INJECTION SUBCUTANEOUS at 13:32

## 2020-03-08 NOTE — PROGRESS NOTE ADULT - PROBLEM SELECTOR PLAN 1
#Hx recently treated UTI, outpt UCx 2/12/20 with ESBL E. coli. p/w fever, chills and flank pain. A/f complicated UTI with resistant organism --> urosepsis (fever, leukocytosis and urinary source). Low c/f pyelonephritis given lack of CVA tenderness and nontoxic appearance/labs.  - c/w ertapenem 1 g q24h (3/5-) DAY 3  - F/u UCx sent 3/5 --> GNR --> f/u speciation and sensitivities #Hx recently treated UTI, outpt UCx 2/12/20 with ESBL E. coli. p/w fever, chills and flank pain. A/f complicated UTI with resistant organism --> urosepsis (fever, leukocytosis and urinary source). Low c/f pyelonephritis given lack of CVA tenderness and nontoxic appearance/labs.  - c/w ertapenem 1 g q24h (4/5-) DAY 4  - F/u UCx sent 3/5 --> ESBL E coli sensitive to erta

## 2020-03-08 NOTE — PROGRESS NOTE ADULT - PROBLEM SELECTOR PLAN 3
Hx HTN: BPs normal, hold lisinopril and Lopressor (home meds) given urosepsis, reassess tomorrow  Diet: regular  DVT ppx: Enoxaparin 40 mg qd  Dispo: pending urine culture and tx of UTI, d/c to home, ambulating without issues Hx HTN: BPs normal, hold lisinopril and Lopressor (home meds) given urosepsis, reassess tomorrow  Diet: regular  DVT ppx: Enoxaparin 40 mg qd  Dispo: home

## 2020-03-08 NOTE — PROGRESS NOTE ADULT - ASSESSMENT
65 yo M smoker with hx stage 1 lung adenocarcinoma dx in 2015, s/p surgical resection & CHOP/chemo in [facility], with reportedly new renal mass, HTN and hx mycosis fungoides who presents with recurrent UTI (s/p recent tx, hx ESBL 2/12/20 UCx), admitted for management of urosepsis, likely ESBL E. coli UTI - pending UCx.    ID consulted, c/w ertapenem 1 g q24h, 3/5- (DAY 3) 65 yo M smoker with hx stage 1 lung adenocarcinoma dx in 2015, s/p surgical resection & CHOP/chemo in [facility], with reportedly new renal mass, HTN and hx mycosis fungoides who presents with recurrent UTI (s/p recent tx, hx ESBL 2/12/20 UCx), admitted for management of urosepsis, likely ESBL E. coli UTI - pending UCx.    ID consulted, c/w ertapenem 1 g q24h, 3/5- (DAY 4)

## 2020-03-08 NOTE — PROGRESS NOTE ADULT - SUBJECTIVE AND OBJECTIVE BOX
Jed Jeannine, PGY2  Pager [phone]    INCOMPLETE NOTE - IN PROGRESS    Patient is a 64y old  Male who presents with a chief complaint of urosepsis (07 Mar 2020 06:09)      SUBJECTIVE/INTERVAL EVENTS: Patient seen and examined at bedside.    MEDICATIONS  (STANDING):  enoxaparin Injectable 40 milliGRAM(s) SubCutaneous daily  ertapenem  IVPB 1000 milliGRAM(s) IV Intermittent every 24 hours    MEDICATIONS  (PRN):      VITAL SIGNS:  T(F): 97.6 (03-08-20 @ 05:03), Max: 98.3 (03-07-20 @ 20:40)  HR: 71 (03-08-20 @ 05:03) (71 - 81)  BP: 135/75 (03-08-20 @ 05:03) (107/64 - 135/75)  RR: 18 (03-08-20 @ 05:03) (18 - 18)  SpO2: 100% (03-08-20 @ 05:03) (98% - 100%)    I&O's Summary    07 Mar 2020 06:01  -  08 Mar 2020 07:00  --------------------------------------------------------  IN: 0 mL / OUT: 1050 mL / NET: -1050 mL      Daily     Daily     PHYSICAL EXAM:  Gen: Alert, NAD  HEENT: NCAT, conjunctiva clear, sclera anicteric, no erythema or exudates in the oropharynx, mmm  Neck: Supple, no JVD  CV: RRR, S1S2, no m/r/g  Resp: CTAB, normal respiratory effort  Abd: Soft, nontender, nondistended, normal bowel sounds  Ext: no edema, no clubbing or cyanosis  Neuro: AOx3, CN2-12 grossly intact, REYNOSO  SKIN: warm, perfused    LABS:                        12.1   6.52  )-----------( 224      ( 07 Mar 2020 06:45 )             40.1     Hgb Trend: 12.1<--, 11.5<--, 12.6<--  03-07    138  |  100  |  16  ----------------------------<  106<H>  4.8   |  25  |  0.78    Ca    9.6      07 Mar 2020 06:45    TPro  7.2  /  Alb  3.6  /  TBili  0.3  /  DBili  x   /  AST  6   /  ALT  10  /  AlkPhos  63  03-06    Creatinine Trend: 0.78<--, 0.81<--, 0.83<--  LIVER FUNCTIONS - ( 06 Mar 2020 09:43 )  Alb: 3.6 g/dL / Pro: 7.2 g/dL / ALK PHOS: 63 u/L / ALT: 10 u/L / AST: 6 u/L / GGT: x                     CAPILLARY BLOOD GLUCOSE          RADIOLOGY & ADDITIONAL TESTS: Reviewed    Imaging Personally Reviewed:    Consultant(s) Notes Reviewed:      Care Discussed with Consultants/Other Providers: Jed Paez, PGY2  Pager [phone]      Patient is a 64y old  Male who presents with a chief complaint of urosepsis (07 Mar 2020 06:09)      SUBJECTIVE/INTERVAL EVENTS: Patient seen and examined at bedside. JAE o/n. Feels well. Denies any symptoms this AM.    MEDICATIONS  (STANDING):  enoxaparin Injectable 40 milliGRAM(s) SubCutaneous daily  ertapenem  IVPB 1000 milliGRAM(s) IV Intermittent every 24 hours    MEDICATIONS  (PRN):      VITAL SIGNS:  T(F): 97.6 (03-08-20 @ 05:03), Max: 98.3 (03-07-20 @ 20:40)  HR: 71 (03-08-20 @ 05:03) (71 - 81)  BP: 135/75 (03-08-20 @ 05:03) (107/64 - 135/75)  RR: 18 (03-08-20 @ 05:03) (18 - 18)  SpO2: 100% (03-08-20 @ 05:03) (98% - 100%)    I&O's Summary    07 Mar 2020 06:01  -  08 Mar 2020 07:00  --------------------------------------------------------  IN: 0 mL / OUT: 1050 mL / NET: -1050 mL      Daily     Daily     PHYSICAL EXAM:  Gen: Alert, NAD  HEENT: NCAT, conjunctiva clear, sclera anicteric  Neck: Supple, no JVD  CV: RRR, S1S2, no m/r/g  Resp: CTAB, normal respiratory effort  Abd: Soft, nontender, nondistended, normal bowel sounds  : No CVA tenderness  Ext: no edema, no clubbing or cyanosis  Neuro: Alert, CN2-12 grossly intact, REYNOSO  SKIN: warm, perfused    LABS:                        12.1   6.52  )-----------( 224      ( 07 Mar 2020 06:45 )             40.1     Hgb Trend: 12.1<--, 11.5<--, 12.6<--  03-07    138  |  100  |  16  ----------------------------<  106<H>  4.8   |  25  |  0.78    Ca    9.6      07 Mar 2020 06:45    TPro  7.2  /  Alb  3.6  /  TBili  0.3  /  DBili  x   /  AST  6   /  ALT  10  /  AlkPhos  63  03-06    Creatinine Trend: 0.78<--, 0.81<--, 0.83<--  LIVER FUNCTIONS - ( 06 Mar 2020 09:43 )  Alb: 3.6 g/dL / Pro: 7.2 g/dL / ALK PHOS: 63 u/L / ALT: 10 u/L / AST: 6 u/L / GGT: x                     CAPILLARY BLOOD GLUCOSE          RADIOLOGY & ADDITIONAL TESTS: Reviewed    Imaging Personally Reviewed:    Consultant(s) Notes Reviewed:      Care Discussed with Consultants/Other Providers: Jed Paez, PGY2  Pager [phone]      Patient is a 64y old  Male who presents with a chief complaint of urosepsis (07 Mar 2020 06:09)      SUBJECTIVE/INTERVAL EVENTS: Patient seen and examined at bedside. JAE o/n. Feels well. Denies any symptoms this AM. No f/c/n/v/d/cp/sob.    MEDICATIONS  (STANDING):  enoxaparin Injectable 40 milliGRAM(s) SubCutaneous daily  ertapenem  IVPB 1000 milliGRAM(s) IV Intermittent every 24 hours    MEDICATIONS  (PRN):      VITAL SIGNS:  T(F): 97.6 (03-08-20 @ 05:03), Max: 98.3 (03-07-20 @ 20:40)  HR: 71 (03-08-20 @ 05:03) (71 - 81)  BP: 135/75 (03-08-20 @ 05:03) (107/64 - 135/75)  RR: 18 (03-08-20 @ 05:03) (18 - 18)  SpO2: 100% (03-08-20 @ 05:03) (98% - 100%)    I&O's Summary    07 Mar 2020 06:01  -  08 Mar 2020 07:00  --------------------------------------------------------  IN: 0 mL / OUT: 1050 mL / NET: -1050 mL      Daily     Daily     PHYSICAL EXAM:  Gen: Alert, NAD  HEENT: NCAT, conjunctiva clear, sclera anicteric  Neck: Supple, no JVD  CV: RRR, S1S2, no m/r/g  Resp: CTAB, normal respiratory effort  Abd: Soft, nontender, nondistended, normal bowel sounds  : No CVA tenderness  Ext: no edema, no clubbing or cyanosis  Neuro: Alert, CN2-12 grossly intact, REYNOSO  SKIN: warm, perfused    LABS:                        12.1   6.52  )-----------( 224      ( 07 Mar 2020 06:45 )             40.1     Hgb Trend: 12.1<--, 11.5<--, 12.6<--  03-07    138  |  100  |  16  ----------------------------<  106<H>  4.8   |  25  |  0.78    Ca    9.6      07 Mar 2020 06:45    TPro  7.2  /  Alb  3.6  /  TBili  0.3  /  DBili  x   /  AST  6   /  ALT  10  /  AlkPhos  63  03-06    Creatinine Trend: 0.78<--, 0.81<--, 0.83<--  LIVER FUNCTIONS - ( 06 Mar 2020 09:43 )  Alb: 3.6 g/dL / Pro: 7.2 g/dL / ALK PHOS: 63 u/L / ALT: 10 u/L / AST: 6 u/L / GGT: x                     CAPILLARY BLOOD GLUCOSE          RADIOLOGY & ADDITIONAL TESTS: Reviewed    Imaging Personally Reviewed:    Consultant(s) Notes Reviewed:      Care Discussed with Consultants/Other Providers:

## 2020-03-08 NOTE — PROGRESS NOTE ADULT - PROBLEM SELECTOR PLAN 2
#Dx and tx in [facility], s/p lung resection and chemo. Not on chemo currently. Recently detected R hilar LAD.  - Oncology consulted - rec continued ABx treatment, plan for outpt bx of R hilar LN

## 2020-03-09 LAB
ANION GAP SERPL CALC-SCNC: 15 MMO/L — HIGH (ref 7–14)
BUN SERPL-MCNC: 16 MG/DL — SIGNIFICANT CHANGE UP (ref 7–23)
CALCIUM SERPL-MCNC: 9.5 MG/DL — SIGNIFICANT CHANGE UP (ref 8.4–10.5)
CHLORIDE SERPL-SCNC: 100 MMOL/L — SIGNIFICANT CHANGE UP (ref 98–107)
CO2 SERPL-SCNC: 23 MMOL/L — SIGNIFICANT CHANGE UP (ref 22–31)
CREAT SERPL-MCNC: 0.88 MG/DL — SIGNIFICANT CHANGE UP (ref 0.5–1.3)
GLUCOSE SERPL-MCNC: 92 MG/DL — SIGNIFICANT CHANGE UP (ref 70–99)
HCT VFR BLD CALC: 39.5 % — SIGNIFICANT CHANGE UP (ref 39–50)
HGB BLD-MCNC: 12.8 G/DL — LOW (ref 13–17)
MAGNESIUM SERPL-MCNC: 2 MG/DL — SIGNIFICANT CHANGE UP (ref 1.6–2.6)
MCHC RBC-ENTMCNC: 26.7 PG — LOW (ref 27–34)
MCHC RBC-ENTMCNC: 32.4 % — SIGNIFICANT CHANGE UP (ref 32–36)
MCV RBC AUTO: 82.3 FL — SIGNIFICANT CHANGE UP (ref 80–100)
NRBC # FLD: 0 K/UL — SIGNIFICANT CHANGE UP (ref 0–0)
PHOSPHATE SERPL-MCNC: 3.2 MG/DL — SIGNIFICANT CHANGE UP (ref 2.5–4.5)
PLATELET # BLD AUTO: 252 K/UL — SIGNIFICANT CHANGE UP (ref 150–400)
PMV BLD: 9.4 FL — SIGNIFICANT CHANGE UP (ref 7–13)
POTASSIUM SERPL-MCNC: 4.1 MMOL/L — SIGNIFICANT CHANGE UP (ref 3.5–5.3)
POTASSIUM SERPL-SCNC: 4.1 MMOL/L — SIGNIFICANT CHANGE UP (ref 3.5–5.3)
RBC # BLD: 4.8 M/UL — SIGNIFICANT CHANGE UP (ref 4.2–5.8)
RBC # FLD: 17.3 % — HIGH (ref 10.3–14.5)
SODIUM SERPL-SCNC: 138 MMOL/L — SIGNIFICANT CHANGE UP (ref 135–145)
WBC # BLD: 6.86 K/UL — SIGNIFICANT CHANGE UP (ref 3.8–10.5)
WBC # FLD AUTO: 6.86 K/UL — SIGNIFICANT CHANGE UP (ref 3.8–10.5)

## 2020-03-09 PROCEDURE — 99233 SBSQ HOSP IP/OBS HIGH 50: CPT | Mod: GC

## 2020-03-09 PROCEDURE — 99232 SBSQ HOSP IP/OBS MODERATE 35: CPT

## 2020-03-09 RX ADMIN — ERTAPENEM SODIUM 120 MILLIGRAM(S): 1 INJECTION, POWDER, LYOPHILIZED, FOR SOLUTION INTRAMUSCULAR; INTRAVENOUS at 12:58

## 2020-03-09 RX ADMIN — ENOXAPARIN SODIUM 40 MILLIGRAM(S): 100 INJECTION SUBCUTANEOUS at 12:58

## 2020-03-09 NOTE — PROGRESS NOTE ADULT - SUBJECTIVE AND OBJECTIVE BOX
Follow Up:  complicated UTI     Inverval History/ROS:  feels better. no fever, chills, flank pain, dysuria.    rest of ros neg    Allergies  No Known Allergies        ANTIMICROBIALS:  ertapenem  IVPB 1000 every 24 hours      OTHER MEDS:  enoxaparin Injectable 40 milliGRAM(s) SubCutaneous daily    Vital Signs Last 24 Hrs  T(F): 98.3 (03-09-20 @ 13:25), Max: 98.3 (03-09-20 @ 13:25)  HR: 88 (03-09-20 @ 13:25)  BP: 128/80 (03-09-20 @ 13:25)  RR: 17 (03-09-20 @ 13:25)  SpO2: 97% (03-09-20 @ 13:25) (97% - 98%)    PHYSICAL EXAM:  General: [x ] non-toxic  HEAD/EYES: [ ] PERRL [ x] white sclera [ ] icterus  ENT:  [ ] normal [x ] supple [ ] thrush [ ] pharyngeal exudate  Cardiovascular:   [ ] murmur [x ] normal [ ] PPM/AICD  Respiratory:  [x ] clear to ausculation bilaterally  GI:  [x ] soft, non-tender, normal bowel sounds  :  [ ] martinez [x ] no CVA tenderness   Musculoskeletal:  [x ] no synovitis  Neurologic:  [ x] non-focal exam   Skin:  chest erythema  Lymph: [ ] no lymphadenopathy  Psychiatric:  [x ] appropriate affect [x ] alert & oriented  Lines:  [x ] no phlebitis [ ] central line                            12.8   6.86  )-----------( 252      ( 09 Mar 2020 06:00 )             39.5 03-09    138  |  100  |  16  ----------------------------<  92  4.1   |  23  |  0.88  Ca    9.5      09 Mar 2020 06:00Phos  3.2     03-09Mg     2.0     03-09            MICROBIOLOGY:  Culture - Blood (03.05.20 @ 14:17)    Specimen Source: .Blood Blood-Venous    Culture Results:   No growth to date.    Culture - Blood (03.05.20 @ 14:17)    Specimen Source: .Blood Blood    Culture Results:   No growth to date.        RADIOLOGY:    < from: Xray Chest 2 Views PA/Lat (03.05.20 @ 12:42) >  EXAM:  Frontal and lateral chest from 3/5/2020 at 1242. Reviewed in conjunction with chest CT from 2/14/2020.     IMPRESSION:  Low lung volumes.    Right hilar region surgical chain staples with adjacent obliquely oriented curvilinear opacity compatible with scarring. Clear remaining visualized lungs. No pleural effusions or pneumothorax.    Stable cardiac and mediastinal silhouettes.     Trachea midline.    Redemonstrated old anterolateral right 5th rib fracture deformity. Spinal degenerative changes again noted.    < end of copied text >  < from: CT Abdomen and Pelvis w/ Oral Cont and w/ IV Cont (02.14.20 @ 14:12) >  IMPRESSION:     Enlarged right hilar lymph node unchanged.  Apparent right middle lobectomy with postsurgical scarring.  Bilateral small indeterminant adrenal nodule is unchanged.    < end of copied text >

## 2020-03-09 NOTE — PROGRESS NOTE ADULT - SUBJECTIVE AND OBJECTIVE BOX
[facility] Division of Hospital Medicine  Tunde Balbuena MD PGY-1  Pager: [phone]      Patient is a 64y old  Male who presents with a chief complaint of urosepsis (08 Mar 2020 07:13)      SUBJECTIVE / OVERNIGHT EVENTS:    MEDICATIONS  (STANDING):  enoxaparin Injectable 40 milliGRAM(s) SubCutaneous daily  ertapenem  IVPB 1000 milliGRAM(s) IV Intermittent every 24 hours    MEDICATIONS  (PRN):      CAPILLARY BLOOD GLUCOSE        I&O's Summary    07 Mar 2020 06:01  -  08 Mar 2020 07:00  --------------------------------------------------------  IN: 0 mL / OUT: 1050 mL / NET: -1050 mL        PHYSICAL EXAM:  Vital Signs Last 24 Hrs  T(C): 36.7 (09 Mar 2020 05:09), Max: 37.1 (08 Mar 2020 12:25)  T(F): 98 (09 Mar 2020 05:09), Max: 98.8 (08 Mar 2020 12:25)  HR: 67 (09 Mar 2020 05:09) (67 - 78)  BP: 136/88 (09 Mar 2020 05:09) (127/75 - 139/86)  BP(mean): --  RR: 18 (09 Mar 2020 05:09) (16 - 18)  SpO2: 98% (09 Mar 2020 05:09) (97% - 99%)    CONSTITUTIONAL: NAD, well-developed, well-groomed  EYES: PERRLA; conjunctiva and sclera clear  ENMT: Moist oral mucosa, no pharyngeal injection or exudates; normal dentition  NECK: Supple, no palpable masses; no thyromegaly  RESPIRATORY: Normal respiratory effort; lungs are clear to auscultation bilaterally  CARDIOVASCULAR: Regular rate and rhythm, normal S1 and S2, no murmur/rub/gallop; No lower extremity edema; Peripheral pulses are 2+ bilaterally  ABDOMEN: Nontender to palpation, normoactive bowel sounds, no rebound/guarding  MUSCULOSKELETAL:  no clubbing or cyanosis of digits; no joint swelling or tenderness to palpation  PSYCH: A+O to person, place, and time; affect appropriate  NEUROLOGY: CN 2-12 are intact and symmetric; no gross sensory deficits   SKIN: No rashes; no palpable lesions    LABS:                        12.5   6.51  )-----------( 194      ( 08 Mar 2020 05:50 )             41.0     03-08    138  |  99  |  14  ----------------------------<  104<H>  4.7   |  22  |  0.81    Ca    9.6      08 Mar 2020 05:50                  RADIOLOGY & ADDITIONAL TESTS:  Results Reviewed:   Imaging Personally Reviewed:  Electrocardiogram Personally Reviewed:    COORDINATION OF CARE:  Care Discussed with Consultants/Other Providers [Y/N]:  Prior or Outpatient Records Reviewed [Y/N]: [facility]  Tunde Balbuena MD PGY-1  Pager: [phone]      Patient is a 64y old  Male who presents with a chief complaint of urosepsis (08 Mar 2020 07:13)      SUBJECTIVE / OVERNIGHT EVENTS:    MEDICATIONS  (STANDING):  enoxaparin Injectable 40 milliGRAM(s) SubCutaneous daily  ertapenem  IVPB 1000 milliGRAM(s) IV Intermittent every 24 hours    MEDICATIONS  (PRN):      CAPILLARY BLOOD GLUCOSE        I&O's Summary    07 Mar 2020 06:01  -  08 Mar 2020 07:00  --------------------------------------------------------  IN: 0 mL / OUT: 1050 mL / NET: -1050 mL        PHYSICAL EXAM:  Vital Signs Last 24 Hrs  T(C): 36.7 (09 Mar 2020 05:09), Max: 37.1 (08 Mar 2020 12:25)  T(F): 98 (09 Mar 2020 05:09), Max: 98.8 (08 Mar 2020 12:25)  HR: 67 (09 Mar 2020 05:09) (67 - 78)  BP: 136/88 (09 Mar 2020 05:09) (127/75 - 139/86)  BP(mean): --  RR: 18 (09 Mar 2020 05:09) (16 - 18)  SpO2: 98% (09 Mar 2020 05:09) (97% - 99%)    Gen: Alert, NAD  HEENT: NCAT, conjunctiva clear, sclera anicteric  Neck: Supple, no JVD  CV: RRR, S1S2, no m/r/g  Resp: CTAB, normal respiratory effort  Abd: Soft, nontender, nondistended, normal bowel sounds  : No CVA tenderness  Ext: no edema, no clubbing or cyanosis  Neuro: Alert, CN2-12 grossly intact, REYNOSO  SKIN: warm, perfused    LABS:                        12.5   6.51  )-----------( 194      ( 08 Mar 2020 05:50 )             41.0     03-08    138  |  99  |  14  ----------------------------<  104<H>  4.7   |  22  |  0.81    Ca    9.6      08 Mar 2020 05:50                  RADIOLOGY & ADDITIONAL TESTS:  Results Reviewed:   Imaging Personally Reviewed:  Electrocardiogram Personally Reviewed:    COORDINATION OF CARE:  Care Discussed with Consultants/Other Providers [Y/N]:  Prior or Outpatient Records Reviewed [Y/N]: [facility]  Tunde Balbuena MD PGY-1  Pager: [phone]      Patient is a 64y old  Male who presents with a chief complaint of urosepsis (08 Mar 2020 07:13)      SUBJECTIVE / OVERNIGHT EVENTS: No acute overnight events. Patient seen w wife at bedside. Patient without complaints, states that he feels well and wishes to go home.     MEDICATIONS  (STANDING):  enoxaparin Injectable 40 milliGRAM(s) SubCutaneous daily  ertapenem  IVPB 1000 milliGRAM(s) IV Intermittent every 24 hours    MEDICATIONS  (PRN):      CAPILLARY BLOOD GLUCOSE        I&O's Summary    07 Mar 2020 06:01  -  08 Mar 2020 07:00  --------------------------------------------------------  IN: 0 mL / OUT: 1050 mL / NET: -1050 mL        PHYSICAL EXAM:  Vital Signs Last 24 Hrs  T(C): 36.7 (09 Mar 2020 05:09), Max: 37.1 (08 Mar 2020 12:25)  T(F): 98 (09 Mar 2020 05:09), Max: 98.8 (08 Mar 2020 12:25)  HR: 67 (09 Mar 2020 05:09) (67 - 78)  BP: 136/88 (09 Mar 2020 05:09) (127/75 - 139/86)  BP(mean): --  RR: 18 (09 Mar 2020 05:09) (16 - 18)  SpO2: 98% (09 Mar 2020 05:09) (97% - 99%)    Gen: Alert, NAD  HEENT: NCAT, conjunctiva clear, sclera anicteric  Neck: Supple, no JVD  CV: RRR, S1S2, no m/r/g  Resp: CTAB, normal respiratory effort  Abd: Soft, nontender, nondistended, normal bowel sounds  : No CVA tenderness  Ext: no edema, no clubbing or cyanosis  Neuro: Alert, CN2-12 grossly intact, REYNOSO  SKIN: warm, perfused    LABS:                        12.5   6.51  )-----------( 194      ( 08 Mar 2020 05:50 )             41.0     03-08    138  |  99  |  14  ----------------------------<  104<H>  4.7   |  22  |  0.81    Ca    9.6      08 Mar 2020 05:50                  RADIOLOGY & ADDITIONAL TESTS:  Results Reviewed:   Imaging Personally Reviewed:  Electrocardiogram Personally Reviewed:    COORDINATION OF CARE:  Care Discussed with Consultants/Other Providers [Y/N]:  Prior or Outpatient Records Reviewed [Y/N]: [facility]  Tunde Balbuena MD PGY-1  Pager: [phone]      Patient is a 64y old  Male who presents with a chief complaint of urosepsis (08 Mar 2020 07:13)      SUBJECTIVE / OVERNIGHT EVENTS: No acute overnight events. Patient seen w wife at bedside. Patient without complaints, states that he feels well and wishes to go home. No n/v/d/cp/sob.    MEDICATIONS  (STANDING):  enoxaparin Injectable 40 milliGRAM(s) SubCutaneous daily  ertapenem  IVPB 1000 milliGRAM(s) IV Intermittent every 24 hours    MEDICATIONS  (PRN):      CAPILLARY BLOOD GLUCOSE        I&O's Summary    07 Mar 2020 06:01  -  08 Mar 2020 07:00  --------------------------------------------------------  IN: 0 mL / OUT: 1050 mL / NET: -1050 mL        PHYSICAL EXAM:  Vital Signs Last 24 Hrs  T(C): 36.7 (09 Mar 2020 05:09), Max: 37.1 (08 Mar 2020 12:25)  T(F): 98 (09 Mar 2020 05:09), Max: 98.8 (08 Mar 2020 12:25)  HR: 67 (09 Mar 2020 05:09) (67 - 78)  BP: 136/88 (09 Mar 2020 05:09) (127/75 - 139/86)  BP(mean): --  RR: 18 (09 Mar 2020 05:09) (16 - 18)  SpO2: 98% (09 Mar 2020 05:09) (97% - 99%)    Gen: Alert, NAD  HEENT: NCAT, conjunctiva clear, sclera anicteric  Neck: Supple, no JVD  CV: RRR, S1S2, no m/r/g  Resp: CTAB, normal respiratory effort  Abd: Soft, nontender, nondistended, normal bowel sounds  : No CVA tenderness  Ext: no edema, no clubbing or cyanosis  Neuro: Alert, CN2-12 grossly intact, REYNOSO  SKIN: warm, perfused    LABS:                        12.5   6.51  )-----------( 194      ( 08 Mar 2020 05:50 )             41.0     03-08    138  |  99  |  14  ----------------------------<  104<H>  4.7   |  22  |  0.81    Ca    9.6      08 Mar 2020 05:50                  RADIOLOGY & ADDITIONAL TESTS:  Results Reviewed:   Imaging Personally Reviewed:  Electrocardiogram Personally Reviewed:    COORDINATION OF CARE:  Care Discussed with Consultants/Other Providers [Y/N]:  Prior or Outpatient Records Reviewed [Y/N]:

## 2020-03-09 NOTE — PROGRESS NOTE ADULT - PROBLEM SELECTOR PLAN 2
#Dx and tx in [facility], s/p lung resection and chemo. Not on chemo currently. Recently detected R hilar LAD.  -Oncology consulted - rec continued ABx treatment, plan for outpt bx of R hilar LN

## 2020-03-09 NOTE — PROGRESS NOTE ADULT - PROBLEM SELECTOR PLAN 1
#Hx recently treated UTI, outpt UCx 2/12/20 with ESBL E. coli. p/w fever, chills and flank pain. A/f complicated UTI with resistant organism --> urosepsis (fever, leukocytosis and urinary source). Low c/f pyelonephritis given lack of CVA tenderness and nontoxic appearance/labs.  - c/w ertapenem 1 g q24h (4/5-) DAY 5  - UCx sent 3/5 --> ESBL E coli sensitive to erta #Hx recently treated UTI, outpt UCx 2/12/20 with ESBL E. coli. p/w fever, chills and flank pain. A/f complicated UTI with resistant organism --> urosepsis (fever, leukocytosis and urinary source). Low c/f pyelonephritis given lack of CVA tenderness and nontoxic appearance/labs.  - c/w ertapenem 1 g q24h (3/5-) DAY 5  - UCx sent 3/5 --> ESBL E coli sensitive to erta #Hx recently treated UTI, outpt UCx 2/12/20 with ESBL E. coli. p/w fever, chills and flank pain. A/f complicated UTI with resistant organism --> urosepsis (fever, leukocytosis and urinary source). Low c/f pyelonephritis given lack of CVA tenderness and nontoxic appearance/labs.  - c/w ertapenem 1 g q24h (3/5-) DAY 5  - UCx sent 3/5 --> ESBL E coli sensitive to ertapenem

## 2020-03-09 NOTE — PHARMACOTHERAPY INTERVENTION NOTE - COMMENTS
65 yo M with ESBL UTI sensitive to Bactrim.    Recommendation(s):  1) ID plan is to convert patient to Bactrim PO.  Based on renal function, dose for patient is 1 tab of DS q12h.    Luis A HobsonD  Infectious Diseases Clinical Pharmacist  Spectra extension [phone]  .

## 2020-03-09 NOTE — PROGRESS NOTE ADULT - SUBJECTIVE AND OBJECTIVE BOX
INTERVAL HPI/OVERNIGHT EVENTS:  Patient seen at bedside.  Spoke to Daughter Kamini over the phone.   Patient is feeling well, no fevers. No complaints.     VITAL SIGNS:  T(F): 98.3 (03-09-20 @ 13:25)  HR: 88 (03-09-20 @ 13:25)  BP: 128/80 (03-09-20 @ 13:25)  RR: 17 (03-09-20 @ 13:25)  SpO2: 97% (03-09-20 @ 13:25)  Wt(kg): --    PHYSICAL EXAM:    Constitutional: NAD, resting in bed comfortably  Eyes: EOMI, sclera non-icteric  Neck: supple, no LAP  Respiratory: CTA b/l, good air entry b/l, no wheezing, rhonchi or crackels  Cardiovascular: RRR, normal S1S2, no M/R/G  Gastrointestinal: soft, NTND  Extremities: no edema  Neurological: AAOx3, non focal  Skin: Normal temperature    MEDICATIONS  (STANDING):  enoxaparin Injectable 40 milliGRAM(s) SubCutaneous daily  ertapenem  IVPB 1000 milliGRAM(s) IV Intermittent every 24 hours    MEDICATIONS  (PRN):      Allergies    No Known Allergies    Intolerances        LABS:                        12.8   6.86  )-----------( 252      ( 09 Mar 2020 06:00 )             39.5     03-09    138  |  100  |  16  ----------------------------<  92  4.1   |  23  |  0.88    Ca    9.5      09 Mar 2020 06:00  Phos  3.2     03-09  Mg     2.0     03-09            RADIOLOGY & ADDITIONAL TESTS:  Studies reviewed.

## 2020-03-09 NOTE — PROGRESS NOTE ADULT - ASSESSMENT
63 yo man with h/o lymphoma, h/o lung adenocarcinoma s/p surgical resection and chemo in [facility]  presenting with fever and found to have pyuria.   ESBL E coli cultured in urine 2/2020.  Urine culture 3/5 ESBL E coli.  Recurrent UTI.  Blood culture no growth.  Clinically improved.      Suggest;  -continue ertapenem 1 gm iv q 24 h  -can transition to bactrim DS 1 tab po BID 3/10- 3/14        Farnaz Yeager MD  Pager: [phone]  After 5 PM or weekends please call fellow on call or office [phone]

## 2020-03-09 NOTE — PROGRESS NOTE ADULT - ASSESSMENT
63 yo M smoker with hx stage 1 lung adenocarcinoma dx in 2015, s/p surgical resection & CHOP/chemo in [facility], with reportedly new renal mass, HTN and hx mycosis fungoides who presents with recurrent UTI (s/p recent tx, hx ESBL 2/12/20 UCx), admitted for management of urosepsis, likely ESBL E. coli UTI - pending UCx.    ID consulted, c/w ertapenem 1 g q24h, 3/5- (DAY 4) 65 yo M smoker with hx stage 1 lung adenocarcinoma dx in 2015, s/p surgical resection & CHOP/chemo in [facility], with reportedly new renal mass, HTN and hx mycosis fungoides who presents with recurrent UTI (s/p recent tx, hx ESBL 2/12/20 UCx), admitted for management of urosepsis, likely ESBL E. coli UTI - pending UCx.    ID consulted, c/w ertapenem 1 g q24h, 3/5- (DAY 5)

## 2020-03-09 NOTE — PROGRESS NOTE ADULT - PROBLEM SELECTOR PLAN 3
Hx HTN: BPs normal, hold lisinopril and Lopressor (home meds) given urosepsis, reassess tomorrow  Diet: regular  DVT ppx: Enoxaparin 40 mg qd  Dispo: home

## 2020-03-09 NOTE — PROGRESS NOTE ADULT - ASSESSMENT
63 yo M smoker with hx stage 1 lung adenocarcinoma dx in 2015, s/p surgical resection & 1 cycle of chemotherapy in [facility] in 3/2019, past history of T cell lymphoma s/p 6 cycles of CHOP in 2015, presents with recurrent UTI (s/p recent treatment with antibiotics prescribed by PMD, hx ESBL 2/12/20 UCx, repeat urine culture came back negative), admitted for management of urosepsis.    ESBL UTI 2/2020, now with recurrence s/p course of PO antibiotics as outpatient by PMD. Per Daughter who is a PA, the prescribed abx was nitrofurantoin.     -Appreciate ID input  -C/w abx  -Concern for disease recurrence in the enlarged hilar node, plan is for hilar node bx by Dr Ramos on 3/19.    -He needs cardiology clearance prior to bx which is scheduled on 3/19. Please ensure that the cardiology clearance is obtained as inpatient if possible, or after discharge.  -Supportive care, pain control, Nutrition, PT, DVT ppx  -Outpatient oncology f/u    Will follow. Please do not hesitate to call back with questions.     Neeta Pinto MD  Medical Oncology Attending  C: [phone]

## 2020-03-10 ENCOUNTER — TRANSCRIPTION ENCOUNTER (OUTPATIENT)
Age: 65
End: 2020-03-10

## 2020-03-10 VITALS
TEMPERATURE: 98 F | OXYGEN SATURATION: 95 % | HEART RATE: 73 BPM | SYSTOLIC BLOOD PRESSURE: 138 MMHG | RESPIRATION RATE: 17 BRPM | DIASTOLIC BLOOD PRESSURE: 79 MMHG

## 2020-03-10 LAB
ANION GAP SERPL CALC-SCNC: 14 MMO/L — SIGNIFICANT CHANGE UP (ref 7–14)
BUN SERPL-MCNC: 18 MG/DL — SIGNIFICANT CHANGE UP (ref 7–23)
CALCIUM SERPL-MCNC: 9.4 MG/DL — SIGNIFICANT CHANGE UP (ref 8.4–10.5)
CHLORIDE SERPL-SCNC: 101 MMOL/L — SIGNIFICANT CHANGE UP (ref 98–107)
CO2 SERPL-SCNC: 23 MMOL/L — SIGNIFICANT CHANGE UP (ref 22–31)
CREAT SERPL-MCNC: 0.88 MG/DL — SIGNIFICANT CHANGE UP (ref 0.5–1.3)
CULTURE RESULTS: SIGNIFICANT CHANGE UP
CULTURE RESULTS: SIGNIFICANT CHANGE UP
GLUCOSE SERPL-MCNC: 102 MG/DL — HIGH (ref 70–99)
HCT VFR BLD CALC: 39.5 % — SIGNIFICANT CHANGE UP (ref 39–50)
HGB BLD-MCNC: 12.3 G/DL — LOW (ref 13–17)
MAGNESIUM SERPL-MCNC: 2 MG/DL — SIGNIFICANT CHANGE UP (ref 1.6–2.6)
MCHC RBC-ENTMCNC: 25.7 PG — LOW (ref 27–34)
MCHC RBC-ENTMCNC: 31.1 % — LOW (ref 32–36)
MCV RBC AUTO: 82.6 FL — SIGNIFICANT CHANGE UP (ref 80–100)
NRBC # FLD: 0 K/UL — SIGNIFICANT CHANGE UP (ref 0–0)
PHOSPHATE SERPL-MCNC: 3.1 MG/DL — SIGNIFICANT CHANGE UP (ref 2.5–4.5)
PLATELET # BLD AUTO: 257 K/UL — SIGNIFICANT CHANGE UP (ref 150–400)
PMV BLD: 9.5 FL — SIGNIFICANT CHANGE UP (ref 7–13)
POTASSIUM SERPL-MCNC: 4.2 MMOL/L — SIGNIFICANT CHANGE UP (ref 3.5–5.3)
POTASSIUM SERPL-SCNC: 4.2 MMOL/L — SIGNIFICANT CHANGE UP (ref 3.5–5.3)
RBC # BLD: 4.78 M/UL — SIGNIFICANT CHANGE UP (ref 4.2–5.8)
RBC # FLD: 17.2 % — HIGH (ref 10.3–14.5)
SODIUM SERPL-SCNC: 138 MMOL/L — SIGNIFICANT CHANGE UP (ref 135–145)
SPECIMEN SOURCE: SIGNIFICANT CHANGE UP
SPECIMEN SOURCE: SIGNIFICANT CHANGE UP
WBC # BLD: 7.02 K/UL — SIGNIFICANT CHANGE UP (ref 3.8–10.5)
WBC # FLD AUTO: 7.02 K/UL — SIGNIFICANT CHANGE UP (ref 3.8–10.5)

## 2020-03-10 PROCEDURE — 99239 HOSP IP/OBS DSCHRG MGMT >30: CPT | Mod: GC

## 2020-03-10 RX ORDER — METOPROLOL TARTRATE 50 MG
1 TABLET ORAL
Qty: 60 | Refills: 0
Start: 2020-03-10 | End: 2020-04-08

## 2020-03-10 RX ORDER — LISINOPRIL 2.5 MG/1
1 TABLET ORAL
Qty: 30 | Refills: 0
Start: 2020-03-10 | End: 2020-04-08

## 2020-03-10 RX ADMIN — Medication 1 TABLET(S): at 12:11

## 2020-03-10 RX ADMIN — ENOXAPARIN SODIUM 40 MILLIGRAM(S): 100 INJECTION SUBCUTANEOUS at 12:11

## 2020-03-10 NOTE — PROGRESS NOTE ADULT - PROBLEM SELECTOR PROBLEM 2
Adenocarcinoma of lung

## 2020-03-10 NOTE — PROGRESS NOTE ADULT - ATTENDING COMMENTS
resolving sepsis - awaiting UCX and BCx, on Erta for presumed ESBL UTI  ID follow up on course as with recurrent UTI in a male, unsure of prostatitis  ONC aware of admission  plan for out-pt hilar node bx with Dr Ramos as out-pt
resolving sepsis - awaiting UCX and BCx, on Erta for presumed ESBL UTI  ID follow up on course as with recurrent UTI in a male   plan for out-pt hilar node bx with Dr Ramos as out-pt.
resolving sepsis - awaiting UCX and BCx, on Erta for presumed ESBL UTI  ID follow up on course as with recurrent UTI in a male   plan for out-pt hilar node bx with Dr Ramos as out-pt.
resolving sepsis - on ertapenem for ESBL e coli complicated UTI.  ID recommending IV course of abx given recurrence of complicated UTI.  plan for out-pt hilar node bx with Dr Ramos as out-pt.
resolving sepsis - on ertapenem for ESBL e coli complicated UTI.  ID recommending transition to PO TMP-SMX. Ready for discharge.   plan for out-pt hilar node bx with Dr Ramos as out-pt.  I spent 35 minutes counseling this patient and coordinating their discharge.

## 2020-03-10 NOTE — DISCHARGE NOTE NURSING/CASE MANAGEMENT/SOCIAL WORK - NSDCPEEMAIL_GEN_ALL_CORE
[facility] email [email or web address]

## 2020-03-10 NOTE — PROGRESS NOTE ADULT - PROBLEM SELECTOR PLAN 1
#Hx recently treated UTI, outpt UCx 2/12/20 with ESBL E. coli. p/w fever, chills and flank pain. A/f complicated UTI with resistant organism --> urosepsis (fever, leukocytosis and urinary source). Low c/f pyelonephritis given lack of CVA tenderness and nontoxic appearance/labs.  - c/w ertapenem 1 g q24h (3/5-) DAY 6  - UCx sent 3/5 --> ESBL E coli sensitive to ertapenem  - plan to transition to PO bactrim to complete 10 day course. #Hx recently treated UTI, outpt UCx 2/12/20 with ESBL E. coli. p/w fever, chills and flank pain. A/f complicated UTI with resistant organism --> urosepsis (fever, leukocytosis and urinary source). Low c/f pyelonephritis given lack of CVA tenderness and nontoxic appearance/labs.  - c/w ertapenem 1 g q24h (3/5-) DAY 6  - UCx sent 3/5 --> ESBL E coli sensitive to ertapenem  - plan to transition to PO bactrim to complete 10 day course on d/c

## 2020-03-10 NOTE — PROGRESS NOTE ADULT - ASSESSMENT
65 yo M smoker with hx stage 1 lung adenocarcinoma dx in 2015, s/p surgical resection & CHOP/chemo in [facility], with reportedly new renal mass, HTN and hx mycosis fungoides who presents with recurrent UTI (s/p recent tx, hx ESBL 2/12/20 UCx), admitted for management of urosepsis, w ESBL E. Coli complicated UTI, ongoing antibiotic treatment with ertapenem. 65 yo M smoker with hx stage 1 lung adenocarcinoma dx in 2015, s/p surgical resection & CHOP/chemo in [facility], with reportedly new renal mass, HTN and hx mycosis fungoides who presents with recurrent UTI (s/p recent tx, hx ESBL 2/12/20 UCx), admitted for management of urosepsis, w ESBL E. Coli complicated UTI, ongoing antibiotic treatment with ertapenem. Organism sensitive to TMP-SMX.

## 2020-03-10 NOTE — DISCHARGE NOTE NURSING/CASE MANAGEMENT/SOCIAL WORK - PATIENT PORTAL LINK FT
You can access the FollowMyHealth Patient Portal offered by [facility] by registering at the following website: [email or web address]. By joining [employer]’s FollowMyHealth portal, you will also be able to view your health information using other applications (apps) compatible with our system.

## 2020-03-10 NOTE — DISCHARGE NOTE NURSING/CASE MANAGEMENT/SOCIAL WORK - NSDCPEWEB_GEN_ALL_CORE
[facility] for Tobacco Control website --- [email or web address] website --- [email or web address]

## 2020-03-10 NOTE — PROGRESS NOTE ADULT - PROBLEM SELECTOR PROBLEM 1
UTI due to extended-spectrum beta lactamase (ESBL) producing Escherichia coli

## 2020-03-10 NOTE — PROGRESS NOTE ADULT - SUBJECTIVE AND OBJECTIVE BOX
[facility] Division of Hospital Medicine  Tunde Balbuena MD PGY-1  Pager: [phone]      Patient is a 64y old  Male who presents with a chief complaint of urosepsis (09 Mar 2020 17:25)      SUBJECTIVE / OVERNIGHT EVENTS:    MEDICATIONS  (STANDING):  enoxaparin Injectable 40 milliGRAM(s) SubCutaneous daily  ertapenem  IVPB 1000 milliGRAM(s) IV Intermittent every 24 hours    MEDICATIONS  (PRN):      CAPILLARY BLOOD GLUCOSE        I&O's Summary      PHYSICAL EXAM:  Vital Signs Last 24 Hrs  T(C): 36.4 (10 Mar 2020 05:37), Max: 36.8 (09 Mar 2020 13:25)  T(F): 97.5 (10 Mar 2020 05:37), Max: 98.3 (09 Mar 2020 13:25)  HR: 64 (10 Mar 2020 05:37) (62 - 88)  BP: 141/90 (10 Mar 2020 05:37) (128/80 - 141/90)  BP(mean): --  RR: 16 (10 Mar 2020 05:37) (16 - 17)  SpO2: 99% (10 Mar 2020 05:37) (97% - 99%)    CONSTITUTIONAL: NAD, well-developed, well-groomed  EYES: PERRLA; conjunctiva and sclera clear  ENMT: Moist oral mucosa, no pharyngeal injection or exudates; normal dentition  NECK: Supple, no palpable masses; no thyromegaly  RESPIRATORY: Normal respiratory effort; lungs are clear to auscultation bilaterally  CARDIOVASCULAR: Regular rate and rhythm, normal S1 and S2, no murmur/rub/gallop; No lower extremity edema; Peripheral pulses are 2+ bilaterally  ABDOMEN: Nontender to palpation, normoactive bowel sounds, no rebound/guarding  MUSCULOSKELETAL:  no clubbing or cyanosis of digits; no joint swelling or tenderness to palpation  PSYCH: A+O to person, place, and time; affect appropriate  NEUROLOGY: CN 2-12 are intact and symmetric; no gross sensory deficits   SKIN: No rashes; no palpable lesions    LABS:                        12.8   6.86  )-----------( 252      ( 09 Mar 2020 06:00 )             39.5     03-09    138  |  100  |  16  ----------------------------<  92  4.1   |  23  |  0.88    Ca    9.5      09 Mar 2020 06:00  Phos  3.2     03-09  Mg     2.0     03-09                  RADIOLOGY & ADDITIONAL TESTS:  Results Reviewed:   Imaging Personally Reviewed:  Electrocardiogram Personally Reviewed:    COORDINATION OF CARE:  Care Discussed with Consultants/Other Providers [Y/N]:  Prior or Outpatient Records Reviewed [Y/N]: [facility] Division of Hospital Medicine  Tunde Balbuena MD PGY-1  Pager: [phone]      Patient is a 64y old  Male who presents with a chief complaint of urosepsis (09 Mar 2020 17:25)      SUBJECTIVE / OVERNIGHT EVENTS: No events O/N.     MEDICATIONS  (STANDING):  enoxaparin Injectable 40 milliGRAM(s) SubCutaneous daily  ertapenem  IVPB 1000 milliGRAM(s) IV Intermittent every 24 hours    MEDICATIONS  (PRN):      CAPILLARY BLOOD GLUCOSE        I&O's Summary      PHYSICAL EXAM:  Vital Signs Last 24 Hrs  T(C): 36.4 (10 Mar 2020 05:37), Max: 36.8 (09 Mar 2020 13:25)  T(F): 97.5 (10 Mar 2020 05:37), Max: 98.3 (09 Mar 2020 13:25)  HR: 64 (10 Mar 2020 05:37) (62 - 88)  BP: 141/90 (10 Mar 2020 05:37) (128/80 - 141/90)  BP(mean): --  RR: 16 (10 Mar 2020 05:37) (16 - 17)  SpO2: 99% (10 Mar 2020 05:37) (97% - 99%)    CONSTITUTIONAL: NAD, well-developed, well-groomed  EYES: PERRLA; conjunctiva and sclera clear  ENMT: Moist oral mucosa, no pharyngeal injection or exudates; normal dentition  NECK: Supple, no palpable masses; no thyromegaly  RESPIRATORY: Normal respiratory effort; lungs are clear to auscultation bilaterally  CARDIOVASCULAR: Regular rate and rhythm, normal S1 and S2, no murmur/rub/gallop; No lower extremity edema; Peripheral pulses are 2+ bilaterally  ABDOMEN: Nontender to palpation, normoactive bowel sounds, no rebound/guarding  MUSCULOSKELETAL:  no clubbing or cyanosis of digits; no joint swelling or tenderness to palpation  PSYCH: A+O to person, place, and time; affect appropriate  NEUROLOGY: CN 2-12 are intact and symmetric; no gross sensory deficits   SKIN: No rashes; no palpable lesions    LABS:                        12.8   6.86  )-----------( 252      ( 09 Mar 2020 06:00 )             39.5     03-09    138  |  100  |  16  ----------------------------<  92  4.1   |  23  |  0.88    Ca    9.5      09 Mar 2020 06:00  Phos  3.2     03-09  Mg     2.0     03-09                  RADIOLOGY & ADDITIONAL TESTS:  Results Reviewed:   Imaging Personally Reviewed:  Electrocardiogram Personally Reviewed:    COORDINATION OF CARE:  Care Discussed with Consultants/Other Providers [Y/N]:  Prior or Outpatient Records Reviewed [Y/N]: [facility] Division of Hospital Medicine  uTnde Balbuena MD PGY-1  Pager: [phone]      Patient is a 64y old  Male who presents with a chief complaint of urosepsis (09 Mar 2020 17:25)      SUBJECTIVE / OVERNIGHT EVENTS: No events O/N. Patient states he would like to go home if possible. Discussed with patient and daughter.     MEDICATIONS  (STANDING):  enoxaparin Injectable 40 milliGRAM(s) SubCutaneous daily  ertapenem  IVPB 1000 milliGRAM(s) IV Intermittent every 24 hours    MEDICATIONS  (PRN):      CAPILLARY BLOOD GLUCOSE        I&O's Summary      PHYSICAL EXAM:  Vital Signs Last 24 Hrs  T(C): 36.4 (10 Mar 2020 05:37), Max: 36.8 (09 Mar 2020 13:25)  T(F): 97.5 (10 Mar 2020 05:37), Max: 98.3 (09 Mar 2020 13:25)  HR: 64 (10 Mar 2020 05:37) (62 - 88)  BP: 141/90 (10 Mar 2020 05:37) (128/80 - 141/90)  BP(mean): --  RR: 16 (10 Mar 2020 05:37) (16 - 17)  SpO2: 99% (10 Mar 2020 05:37) (97% - 99%)    CONSTITUTIONAL: NAD, well-developed, well-groomed  EYES: PERRLA; conjunctiva and sclera clear  ENMT: Moist oral mucosa, no pharyngeal injection or exudates; normal dentition  NECK: Supple, no palpable masses; no thyromegaly  RESPIRATORY: Normal respiratory effort; lungs are clear to auscultation bilaterally  CARDIOVASCULAR: Regular rate and rhythm, normal S1 and S2, no murmur/rub/gallop; No lower extremity edema; Peripheral pulses are 2+ bilaterally  ABDOMEN: Nontender to palpation, normoactive bowel sounds, no rebound/guarding  MUSCULOSKELETAL:  no clubbing or cyanosis of digits; no joint swelling or tenderness to palpation  PSYCH: A+O to person, place, and time; affect appropriate  NEUROLOGY: CN 2-12 are intact and symmetric; no gross sensory deficits   SKIN: No rashes; no palpable lesions    LABS:                        12.8   6.86  )-----------( 252      ( 09 Mar 2020 06:00 )             39.5     03-09    138  |  100  |  16  ----------------------------<  92  4.1   |  23  |  0.88    Ca    9.5      09 Mar 2020 06:00  Phos  3.2     03-09  Mg     2.0     03-09                  RADIOLOGY & ADDITIONAL TESTS:  Results Reviewed:   Imaging Personally Reviewed:  Electrocardiogram Personally Reviewed:    COORDINATION OF CARE:  Care Discussed with Consultants/Other Providers [Y/N]:  Prior or Outpatient Records Reviewed [Y/N]: [facility] Division of Hospital Medicine  Tunde Balbuena MD PGY-1  Pager: [phone]      Patient is a 64y old  Male who presents with a chief complaint of urosepsis (09 Mar 2020 17:25)      SUBJECTIVE / OVERNIGHT EVENTS: No events O/N. Patient states he would like to go home if possible. Discussed with patient and daughter. No n/v/d/cp/sob.    MEDICATIONS  (STANDING):  enoxaparin Injectable 40 milliGRAM(s) SubCutaneous daily  ertapenem  IVPB 1000 milliGRAM(s) IV Intermittent every 24 hours    MEDICATIONS  (PRN):      CAPILLARY BLOOD GLUCOSE        I&O's Summary      PHYSICAL EXAM:  Vital Signs Last 24 Hrs  T(C): 36.4 (10 Mar 2020 05:37), Max: 36.8 (09 Mar 2020 13:25)  T(F): 97.5 (10 Mar 2020 05:37), Max: 98.3 (09 Mar 2020 13:25)  HR: 64 (10 Mar 2020 05:37) (62 - 88)  BP: 141/90 (10 Mar 2020 05:37) (128/80 - 141/90)  BP(mean): --  RR: 16 (10 Mar 2020 05:37) (16 - 17)  SpO2: 99% (10 Mar 2020 05:37) (97% - 99%)    CONSTITUTIONAL: NAD, well-developed, well-groomed  EYES: PERRLA; conjunctiva and sclera clear  ENMT: Moist oral mucosa, no pharyngeal injection or exudates; normal dentition  NECK: Supple, no palpable masses; no thyromegaly  RESPIRATORY: Normal respiratory effort; lungs are clear to auscultation bilaterally  CARDIOVASCULAR: Regular rate and rhythm, normal S1 and S2, no murmur/rub/gallop; No lower extremity edema; Peripheral pulses are 2+ bilaterally  ABDOMEN: Nontender to palpation, normoactive bowel sounds, no rebound/guarding  MUSCULOSKELETAL:  no clubbing or cyanosis of digits; no joint swelling or tenderness to palpation  PSYCH: A+O to person, place, and time; affect appropriate  NEUROLOGY: CN 2-12 are intact and symmetric; no gross sensory deficits   SKIN: No rashes; no palpable lesions    LABS:                          12.3   7.02  )-----------( 257      ( 10 Mar 2020 06:15 )             39.5                         12.8   6.86  )-----------( 252      ( 09 Mar 2020 06:00 )             39.5     03-10    138  |  101  |  18  ----------------------------<  102<H>  4.2   |  23  |  0.88    Ca    9.4      10 Mar 2020 06:15  Phos  3.1     03-10  Mg     2.0     03-10        03-09    138  |  100  |  16  ----------------------------<  92  4.1   |  23  |  0.88    Ca    9.5      09 Mar 2020 06:00  Phos  3.2     03-09  Mg     2.0     03-09                  RADIOLOGY & ADDITIONAL TESTS:  Results Reviewed:   Imaging Personally Reviewed:  Electrocardiogram Personally Reviewed:    COORDINATION OF CARE:  Care Discussed with Consultants/Other Providers [Y/N]:  Prior or Outpatient Records Reviewed [Y/N]:

## 2020-03-10 NOTE — PROGRESS NOTE ADULT - PROBLEM SELECTOR PLAN 2
#Dx and tx in [facility], s/p lung resection and chemo. Not on chemo currently. Recently detected R hilar LAD.  -Oncology consulted - rec continued ABx treatment, plan for outpt bx of R hilar LN #Dx and tx in [facility], s/p lung resection and chemo. Not on chemo currently. Recently detected R hilar LAD.  -Oncology consulted - rec continued ABx treatment, plan for outpt bx of R hilar LN  -cards referral for clearance prior to hilar bx

## 2020-03-13 ENCOUNTER — OUTPATIENT (OUTPATIENT)
Dept: OUTPATIENT SERVICES | Facility: HOSPITAL | Age: 65
LOS: 1 days | End: 2020-03-13

## 2020-03-13 VITALS
TEMPERATURE: 98 F | WEIGHT: 197.98 LBS | DIASTOLIC BLOOD PRESSURE: 60 MMHG | OXYGEN SATURATION: 98 % | HEART RATE: 97 BPM | SYSTOLIC BLOOD PRESSURE: 100 MMHG | HEIGHT: 64.5 IN | RESPIRATION RATE: 16 BRPM

## 2020-03-13 DIAGNOSIS — C34.90 MALIGNANT NEOPLASM OF UNSPECIFIED PART OF UNSPECIFIED BRONCHUS OR LUNG: ICD-10-CM

## 2020-03-13 DIAGNOSIS — N12 TUBULO-INTERSTITIAL NEPHRITIS, NOT SPECIFIED AS ACUTE OR CHRONIC: ICD-10-CM

## 2020-03-13 DIAGNOSIS — M71.20 SYNOVIAL CYST OF POPLITEAL SPACE [BAKER], UNSPECIFIED KNEE: Chronic | ICD-10-CM

## 2020-03-13 DIAGNOSIS — Z90.2 ACQUIRED ABSENCE OF LUNG [PART OF]: Chronic | ICD-10-CM

## 2020-03-13 RX ORDER — SODIUM CHLORIDE 9 MG/ML
1000 INJECTION, SOLUTION INTRAVENOUS
Refills: 0 | Status: DISCONTINUED | OUTPATIENT
Start: 2020-03-18 | End: 2020-04-02

## 2020-03-13 RX ORDER — ERGOCALCIFEROL 1.25 MG/1
1 CAPSULE ORAL
Qty: 0 | Refills: 0 | DISCHARGE

## 2020-03-13 RX ORDER — SODIUM CHLORIDE 9 MG/ML
3 INJECTION INTRAMUSCULAR; INTRAVENOUS; SUBCUTANEOUS EVERY 8 HOURS
Refills: 0 | Status: DISCONTINUED | OUTPATIENT
Start: 2020-03-18 | End: 2020-04-02

## 2020-03-13 NOTE — H&P PST ADULT - NEGATIVE ENMT SYMPTOMS
no throat pain/no dysphagia/no tinnitus/no hearing difficulty/no nasal congestion/no vertigo/no sinus symptoms/no ear pain

## 2020-03-13 NOTE — H&P PST ADULT - CARDIOVASCULAR COMMENTS
reports occasional chest pain "when I am stressed out" - last episode a few months ago reports occasional chest pain "when I am stressed out" - last episode a few months ago. Pt reports not taking his blood pressure medication since recent hospitalization because blood pressure has been low.

## 2020-03-13 NOTE — H&P PST ADULT - NSICDXPASTSURGICALHX_GEN_ALL_CORE_FT
PAST SURGICAL HISTORY:  Ganglion or synovial cyst of knee excision from right knee    S/P lobectomy of lung

## 2020-03-13 NOTE — H&P PST ADULT - NSICDXPASTMEDICALHX_GEN_ALL_CORE_FT
PAST MEDICAL HISTORY:  History of BPH     Hypertension     Mycosis fungoides     Stage I adenocarcinoma of lung, unspecified laterality s/p lung resection R lung, s/p CHOP, cisplatin

## 2020-03-13 NOTE — H&P PST ADULT - HISTORY OF PRESENT ILLNESS
64 year old male with hx of nonhodgkin's lymphoma in 2015 s/p chemo and lung cancer in 2019 s/p resection from right lung with enlarged lymph  node on recent imaging. Pt reports having recent hospitization for urosepsis, was discharged 3/10/2020 which PO anitbiotics which he will complete tomorrow. 64 year old male with hx of non-Hodgkin's lymphoma in 2015 s/p chemo and lung cancer in 2019 s/p resection from right lung with enlarged lymph  node on recent imaging. Pt presents today for presurgical evaluation for Flexible Bronchoscopy, Endo Bronchial Ultrasound.      Pt reports having recent hospitalization for urosepsis, was discharged 3/10/2020 which PO antibiotics which he will complete tomorrow.

## 2020-03-13 NOTE — H&P PST ADULT - NSICDXPROBLEM_GEN_ALL_CORE_FT
PROBLEM DIAGNOSES  Problem: Malignant neoplasm of lung  Assessment and Plan: Pt sheduled for surgery on 3/19/2020.  Pre-op instructions provided. Pt verbalized understanding.   Pepcid provided for GI prophylaxis.   Copy of cardiac clearance in chart.   Pt recently discharged from the hospital with urosepsis - requested medical evaluation preop. Pt verbalized understanding.   Pt states he has not taken his blood pressure medications since recent hospitalization since BP has been low. Pt advised to f/u with PMD regarding medication and if he should take them on day of surgery.   DORENE precautions. Pt with >3 criteria on ST[facility]. OR booking notified.

## 2020-03-17 ENCOUNTER — TRANSCRIPTION ENCOUNTER (OUTPATIENT)
Age: 65
End: 2020-03-17

## 2020-03-18 ENCOUNTER — RESULT REVIEW (OUTPATIENT)
Age: 65
End: 2020-03-18

## 2020-03-18 ENCOUNTER — APPOINTMENT (OUTPATIENT)
Dept: THORACIC SURGERY | Facility: HOSPITAL | Age: 65
End: 2020-03-18

## 2020-03-18 ENCOUNTER — OUTPATIENT (OUTPATIENT)
Dept: OUTPATIENT SERVICES | Facility: HOSPITAL | Age: 65
LOS: 1 days | Discharge: HOME | End: 2020-03-18
Payer: COMMERCIAL

## 2020-03-18 VITALS
HEART RATE: 103 BPM | WEIGHT: 197.98 LBS | DIASTOLIC BLOOD PRESSURE: 82 MMHG | SYSTOLIC BLOOD PRESSURE: 153 MMHG | HEIGHT: 64.5 IN | OXYGEN SATURATION: 97 % | TEMPERATURE: 98 F | RESPIRATION RATE: 16 BRPM

## 2020-03-18 VITALS
OXYGEN SATURATION: 95 % | SYSTOLIC BLOOD PRESSURE: 127 MMHG | RESPIRATION RATE: 18 BRPM | HEART RATE: 85 BPM | DIASTOLIC BLOOD PRESSURE: 63 MMHG

## 2020-03-18 DIAGNOSIS — C34.90 MALIGNANT NEOPLASM OF UNSPECIFIED PART OF UNSPECIFIED BRONCHUS OR LUNG: ICD-10-CM

## 2020-03-18 DIAGNOSIS — Z90.2 ACQUIRED ABSENCE OF LUNG [PART OF]: Chronic | ICD-10-CM

## 2020-03-18 DIAGNOSIS — M71.20 SYNOVIAL CYST OF POPLITEAL SPACE [BAKER], UNSPECIFIED KNEE: Chronic | ICD-10-CM

## 2020-03-18 PROCEDURE — 88172 CYTP DX EVAL FNA 1ST EA SITE: CPT | Mod: 26

## 2020-03-18 PROCEDURE — 88173 CYTOPATH EVAL FNA REPORT: CPT | Mod: 26

## 2020-03-18 PROCEDURE — 31652 BRONCH EBUS SAMPLNG 1/2 NODE: CPT

## 2020-03-18 PROCEDURE — 71045 X-RAY EXAM CHEST 1 VIEW: CPT | Mod: 26

## 2020-03-18 PROCEDURE — 88305 TISSUE EXAM BY PATHOLOGIST: CPT | Mod: 26

## 2020-03-18 RX ORDER — SODIUM CHLORIDE 9 MG/ML
1000 INJECTION, SOLUTION INTRAVENOUS
Refills: 0 | Status: DISCONTINUED | OUTPATIENT
Start: 2020-03-18 | End: 2020-04-02

## 2020-03-18 RX ORDER — FENTANYL CITRATE 50 UG/ML
25 INJECTION INTRAVENOUS
Refills: 0 | Status: DISCONTINUED | OUTPATIENT
Start: 2020-03-18 | End: 2020-03-18

## 2020-03-18 RX ORDER — FENTANYL CITRATE 50 UG/ML
50 INJECTION INTRAVENOUS
Refills: 0 | Status: DISCONTINUED | OUTPATIENT
Start: 2020-03-18 | End: 2020-03-18

## 2020-03-18 RX ORDER — ONDANSETRON 8 MG/1
4 TABLET, FILM COATED ORAL ONCE
Refills: 0 | Status: DISCONTINUED | OUTPATIENT
Start: 2020-03-18 | End: 2020-04-02

## 2020-03-18 RX ADMIN — SODIUM CHLORIDE 30 MILLILITER(S): 9 INJECTION, SOLUTION INTRAVENOUS at 11:51

## 2020-03-18 NOTE — ASU PATIENT PROFILE, ADULT - PMH
History of BPH    Hypertension    Mycosis fungoides    Stage I adenocarcinoma of lung, unspecified laterality  s/p lung resection R lung, s/p CHOP, cisplatin

## 2020-03-18 NOTE — ASU DISCHARGE PLAN (ADULT/PEDIATRIC) - NURSING INSTRUCTIONS
Watch for signs of infection, fever, chills, report such symptoms to the MD.. Drink 6-8 glasses of fluids daily to promote hydration. No heavy lifting, pulling or pushing heavy objects. Follow up with surgical MD.

## 2020-03-18 NOTE — ASU DISCHARGE PLAN (ADULT/PEDIATRIC) - CALL YOUR DOCTOR IF YOU HAVE ANY OF THE FOLLOWING:
Bleeding that does not stop/Inability to tolerate liquids or foods/Pain not relieved by Medications any shortness of breath or chest pain/Unable to urinate/Inability to tolerate liquids or foods/Bleeding that does not stop/Pain not relieved by Medications/Fever greater than (need to indicate Fahrenheit or Celsius)

## 2020-03-18 NOTE — ASU DISCHARGE PLAN (ADULT/PEDIATRIC) - CARE PROVIDER_API CALL
Franck Ramos)  Surgery; Thoracic Surgery  [address] [place] [address], [place] [address]  Phone: [phone]  Fax: [phone]  Follow Up Time: 2 weeks

## 2020-03-20 LAB — NON-GYNECOLOGICAL CYTOLOGY STUDY: SIGNIFICANT CHANGE UP

## 2020-03-26 PROBLEM — I10 ESSENTIAL (PRIMARY) HYPERTENSION: Chronic | Status: ACTIVE | Noted: 2020-03-05

## 2020-03-26 PROBLEM — Z87.438 PERSONAL HISTORY OF OTHER DISEASES OF MALE GENITAL ORGANS: Chronic | Status: ACTIVE | Noted: 2020-03-13

## 2020-03-26 PROBLEM — C84.00 MYCOSIS FUNGOIDES, UNSPECIFIED SITE: Chronic | Status: ACTIVE | Noted: 2020-03-05

## 2020-03-26 PROBLEM — C34.90 MALIGNANT NEOPLASM OF UNSPECIFIED PART OF UNSPECIFIED BRONCHUS OR LUNG: Chronic | Status: ACTIVE | Noted: 2020-03-05

## 2020-03-30 LAB
ALBUMIN SERPL ELPH-MCNC: 4.4 G/DL
ALP BLD-CCNC: 78 U/L
ALT SERPL-CCNC: 11 U/L
ANION GAP SERPL CALC-SCNC: 16 MMOL/L
APPEARANCE: CLEAR
APPEARANCE: CLEAR
AST SERPL-CCNC: 8 U/L
BACTERIA UR CULT: ABNORMAL
BACTERIA UR CULT: NORMAL
BACTERIA: NEGATIVE
BACTERIA: NEGATIVE
BILIRUB SERPL-MCNC: 0.5 MG/DL
BILIRUBIN URINE: NEGATIVE
BILIRUBIN URINE: NEGATIVE
BLOOD URINE: ABNORMAL
BLOOD URINE: NEGATIVE
BUN SERPL-MCNC: 16 MG/DL
CALCIUM SERPL-MCNC: 9.5 MG/DL
CHLORIDE SERPL-SCNC: 100 MMOL/L
CO2 SERPL-SCNC: 24 MMOL/L
COLOR: NORMAL
COLOR: YELLOW
CREAT SERPL-MCNC: 1.02 MG/DL
CRP SERPL-MCNC: 8.39 MG/DL
FERRITIN SERPL-MCNC: 138 NG/ML
FOLATE SERPL-MCNC: 6.4 NG/ML
GLUCOSE QUALITATIVE U: NEGATIVE
GLUCOSE QUALITATIVE U: NEGATIVE
GLUCOSE SERPL-MCNC: 111 MG/DL
HBV CORE IGG+IGM SER QL: NONREACTIVE
HBV SURFACE AB SER QL: NONREACTIVE
HBV SURFACE AG SER QL: NONREACTIVE
HCV AB SER QL: NONREACTIVE
HCV S/CO RATIO: 0.22 S/CO
HIV1+2 AB SPEC QL IA.RAPID: NONREACTIVE
HYALINE CASTS: 0 /LPF
HYALINE CASTS: 0 /LPF
IRON SATN MFR SERPL: 6 %
IRON SERPL-MCNC: 16 UG/DL
KETONES URINE: NEGATIVE
KETONES URINE: NEGATIVE
LDH SERPL-CCNC: 163 U/L
LEUKOCYTE ESTERASE URINE: ABNORMAL
LEUKOCYTE ESTERASE URINE: NEGATIVE
MICROSCOPIC-UA: NORMAL
MICROSCOPIC-UA: NORMAL
NITRITE URINE: NEGATIVE
NITRITE URINE: NEGATIVE
PH URINE: 5.5
PH URINE: 6
POTASSIUM SERPL-SCNC: 4.5 MMOL/L
PROT SERPL-MCNC: 7.5 G/DL
PROTEIN URINE: NEGATIVE
PROTEIN URINE: NORMAL
RED BLOOD CELLS URINE: 2 /HPF
RED BLOOD CELLS URINE: 8 /HPF
SODIUM SERPL-SCNC: 139 MMOL/L
SPECIFIC GRAVITY URINE: 1.02
SPECIFIC GRAVITY URINE: 1.02
SQUAMOUS EPITHELIAL CELLS: 0 /HPF
SQUAMOUS EPITHELIAL CELLS: 1 /HPF
TIBC SERPL-MCNC: 271 UG/DL
TSH SERPL-ACNC: 0.48 UIU/ML
UIBC SERPL-MCNC: 255 UG/DL
URATE SERPL-MCNC: 5.7 MG/DL
UROBILINOGEN URINE: NORMAL
UROBILINOGEN URINE: NORMAL
VIT B12 SERPL-MCNC: 475 PG/ML
WHITE BLOOD CELLS URINE: 1 /HPF
WHITE BLOOD CELLS URINE: 13 /HPF

## 2020-03-31 ENCOUNTER — OUTPATIENT (OUTPATIENT)
Dept: OUTPATIENT SERVICES | Facility: HOSPITAL | Age: 65
LOS: 1 days | Discharge: HOME | End: 2020-03-31

## 2020-03-31 DIAGNOSIS — C34.90 MALIGNANT NEOPLASM OF UNSPECIFIED PART OF UNSPECIFIED BRONCHUS OR LUNG: ICD-10-CM

## 2020-03-31 DIAGNOSIS — Z90.2 ACQUIRED ABSENCE OF LUNG [PART OF]: Chronic | ICD-10-CM

## 2020-03-31 DIAGNOSIS — M71.20 SYNOVIAL CYST OF POPLITEAL SPACE [BAKER], UNSPECIFIED KNEE: Chronic | ICD-10-CM

## 2020-04-01 ENCOUNTER — APPOINTMENT (OUTPATIENT)
Dept: HEMATOLOGY ONCOLOGY | Facility: CLINIC | Age: 65
End: 2020-04-01

## 2020-04-07 ENCOUNTER — APPOINTMENT (OUTPATIENT)
Dept: HEMATOLOGY ONCOLOGY | Facility: CLINIC | Age: 65
End: 2020-04-07
Payer: COMMERCIAL

## 2020-04-07 PROCEDURE — 99215 OFFICE O/P EST HI 40 MIN: CPT

## 2020-04-07 NOTE — HISTORY OF PRESENT ILLNESS
[Home] : at home, [unfilled] , at the time of the visit. [Other Location: e.g. Home (Enter Location, City,State)___] : at [unfilled] [Spouse] : spouse [Family Member] : family member [Patient] : the patient [de-identified] : 64m, smoker (50 p/y), from [facility], with h/o mycosis fungoides, diagnosed in 2015, s/p 7 cycles of CHOP in [facility], and stage I adenocarcinoma of the lung diagnosed 5/2019 s/p surgical resection and 1 cycle of cisplatin/taxol in [facility], and a newly discovered renal mass, presenting to [facility] care.  A few pages of translated medical records are available from [facility] with the above information, and that is all the patient has. He is not able to obtain more medical records.\par \obinna Chahal reports that for the past 2-3 months, he has been having intermittent fevers to 103, and per pt and his wife, the cause remained unknown in [facility], that is why they traveled to the , where their daughter lives for medical care. Patient's daughter Kamini, is a PA in [place]. \par He also reports that he was told he has a renal mass in [facility]. \par \obinna Chahal reports night sweats, he denies weight loss, has intermittent high fevers and reports chills when he has fevers. Has a good appetite. Denies pain, n/v/c/d, sob, has occasional cough, denies urinary symptoms. Continues to smoke 5 cigarettes daily. \par \par CT c/a/p 2/2020 \par Enlarged right hilar lymph node unchanged. \par Apparent right middle lobectomy with postsurgical scarring. \par Bilateral small indeterminant adrenal nodule is unchanged.\par \obinna Chahal was admitted to [facility] with a ESBL ecoli UTI in March and received 7 days of IV abx. \par \par He underwent bronchoscopy and biopsy of the hilar LN by Dr Ramos, which showed NSCLC, however quantity is insufficient for PDL- testing or foundation one testing. \par \par  [de-identified] : Patient seen in the presence of his wife and his daughter via telehealth. Consent was obtained.\par Patient has experienced fever of 102 since yesterday, accompanied by a mild stuffy nose, sweats and headache and neck and back pain. He has taken tylenol, with improvement in the temp to 100.2. He has not been out of the house, but he lives with his wife who works in a hospital and also with 2 other people who have been out and about. His wife experienced a cold last week but without a fever and has now recovered. Other 2 people who live with him are asymptomatic. \par \par Discussed the biopsy results and the options, including sending a liquid biopsy vs waiting until OR opens to have biopsy done. Patient will have liquid biopsy, but has to be tested for COVID-19 first before he can come to our center to have the biopsy. \par

## 2020-04-07 NOTE — ASSESSMENT
[FreeTextEntry1] : 64m, smoker (50 p/y), from [facility], with h/o mycosis fungoides, diagnosed in 2015, s/p 7 cycles of CHOP in [facility], and stage I adenocarcinoma of the lung diagnosed 5/2019 s/p surgical resection and 1 cycle of cisplatin/taxol in [facility], CT c/a/p 2/2020 with hilar LAP, s/p Bronchoscopy and biopsy, with NSCLC in specimen, but quantity insufficient for further mutational or PDL-1 testing. \par Pt has been having a fever to 102, mild coryza, headaches and neck and back pain since yesterday. Has been taking tylenol, with improvement in fever to 100.2. \par Of note, patient had ESBL Ecoli UTI and is s/p hospitalization and 1 week of IV abx 3//2020. \par \par With the current COVID-19 pandemic going on, concern is for COVID-19 infection, will need to present to [facility] for testin. \par Another concern is recurrence of ESBL ecoli UTI, as is commonly seen, he does not have any urinary symptoms, can check UA/UC after COVID is ruled out. \par \par Discussed the bx result and need for more tissue, however no OR time at this point. Can send liquid biopsy once patient is recovered from fever. Will follow up with Dr Ramos when pandemia is controlled. \par \par -Information for [facility] in [place] provided. Patient will go today or tomorrow. \par -Reviewed recommendations on wearing a mask, physical distancing and hand washing. \par -Patient to call our office after testing is done and he has the results\par -If COVID results are negative, will test UA and blood work\par -Will arrange for visit at [facility] to send liquid biopsy after pt has recovered from this fever\par -Follow with Dr Ramos after pandemia is under control\par \par Neeta Pinto MD\par Medical Oncology and Hematology\par

## 2020-04-07 NOTE — REASON FOR VISIT
[Follow-Up Visit] : a follow-up [Spouse] : spouse [Family Member] : family member [FreeTextEntry2] : NSCLC

## 2020-04-07 NOTE — REVIEW OF SYSTEMS
[Fever] : fever [Chills] : chills [Night Sweats] : night sweats [Fatigue] : fatigue [Cough] : cough [Negative] : Allergic/Immunologic

## 2020-04-08 ENCOUNTER — APPOINTMENT (OUTPATIENT)
Dept: DISASTER EMERGENCY | Facility: CLINIC | Age: 65
End: 2020-04-08
Payer: COMMERCIAL

## 2020-04-08 VITALS
SYSTOLIC BLOOD PRESSURE: 150 MMHG | DIASTOLIC BLOOD PRESSURE: 90 MMHG | HEART RATE: 134 BPM | TEMPERATURE: 102.3 F | OXYGEN SATURATION: 96 %

## 2020-04-08 PROCEDURE — 99214 OFFICE O/P EST MOD 30 MIN: CPT

## 2020-04-08 NOTE — HISTORY OF PRESENT ILLNESS
[Patient presents to the office today for COVID-19 evaluation and testing.] : Patient presents to the office today for COVID-19 evaluation and testing. [Patient has been pre-screened by RN at call center for appointment today with our facility.] : Patient has been pre-screened by RN at call center for appointment today with our facility. [FreeTextEntry1] : pt c/o fever (max 102.3:), cough  w/ phlegm, body aches. S&S started since 5 days ago. Has been taking Tylenol\par States no known exposure to covid 19\par \par PMH: Lung cancer [] : no dizziness on standing [None Known] : none known [Age >= 60 years] : age >= 60 years [Lung Disease] : lung disease [Tachycardia] : tachycardia [Clear] : clear [Good Air Entry] : good air entry [Normal O2 sat at rest] : normal O2 sat at rest [Grossly normal, interacts, not tired or weak] : grossly normal, interacts, not tired or weak [COVID-19 testing ordered and specimen obtained] : COVID-19 testing ordered and specimen obtained [Discharged with current Quarantine instructions and advised of signs of worsening illness.] : Patient discharged with current quarantine instructions and advised of signs of worsening illness. Patient told to seek emergent care if symptoms occur. [[id number]] : Pt in NAD, discussed increase po fluid, rest, and Tylenol as needed. Pt to quarantine for 14 days. If no symptoms develop, pt can d/c self quarantine but should continue to practice social distancing. If pt develops symptoms, pt must remain quarantined until pt has no fever for 3 days without Tylenol. \par \par Pt tested today due to S&S, comorbidities and exposure meeting criteria as per [facility] protocol. Results of test will be informed to pt at the provided telephone number. Consent for testing signed. Pt was advised to watch for chest pain, severe SOB at rest, worsening SOB with minimal exertion, new or worsening wheezing, dizziness on standing or generally feeling worse. If they occur, pt is to go to closest ER.\par \par Written information provided, Verbalized understanding of instructions.

## 2020-04-09 LAB — SARS-COV-2 N GENE NPH QL NAA+PROBE: DETECTED

## 2020-04-21 NOTE — ASU PATIENT PROFILE, ADULT - NSALCOHOLPROBLEMSRELYN_GEN_A_CORE_SD
Detail Level: Zone Quality 130: Documentation Of Current Medications In The Medical Record: Current Medications Documented Quality 128: Preventive Care And Screening: Body Mass Index (Bmi) Screening And Follow-Up Plan: BMI is documented within normal parameters and no follow-up plan is required. no

## 2020-04-23 ENCOUNTER — INPATIENT (INPATIENT)
Facility: HOSPITAL | Age: 65
LOS: 6 days | Discharge: HOME HEALTH | End: 2020-04-30
Attending: INTERNAL MEDICINE | Admitting: INTERNAL MEDICINE
Payer: COMMERCIAL

## 2020-04-23 VITALS
HEART RATE: 93 BPM | OXYGEN SATURATION: 97 % | DIASTOLIC BLOOD PRESSURE: 78 MMHG | RESPIRATION RATE: 18 BRPM | SYSTOLIC BLOOD PRESSURE: 143 MMHG | TEMPERATURE: 100 F

## 2020-04-23 DIAGNOSIS — C80.1 MALIGNANT (PRIMARY) NEOPLASM, UNSPECIFIED: ICD-10-CM

## 2020-04-23 DIAGNOSIS — A41.9 SEPSIS, UNSPECIFIED ORGANISM: ICD-10-CM

## 2020-04-23 DIAGNOSIS — I10 ESSENTIAL (PRIMARY) HYPERTENSION: ICD-10-CM

## 2020-04-23 DIAGNOSIS — U07.1 COVID-19: ICD-10-CM

## 2020-04-23 DIAGNOSIS — Z87.438 PERSONAL HISTORY OF OTHER DISEASES OF MALE GENITAL ORGANS: ICD-10-CM

## 2020-04-23 DIAGNOSIS — M71.20 SYNOVIAL CYST OF POPLITEAL SPACE [BAKER], UNSPECIFIED KNEE: Chronic | ICD-10-CM

## 2020-04-23 DIAGNOSIS — N39.0 URINARY TRACT INFECTION, SITE NOT SPECIFIED: ICD-10-CM

## 2020-04-23 DIAGNOSIS — Z90.2 ACQUIRED ABSENCE OF LUNG [PART OF]: Chronic | ICD-10-CM

## 2020-04-23 LAB
ALBUMIN SERPL ELPH-MCNC: 4 G/DL — SIGNIFICANT CHANGE UP (ref 3.3–5)
ALP SERPL-CCNC: 103 U/L — SIGNIFICANT CHANGE UP (ref 40–120)
ALT FLD-CCNC: 59 U/L — HIGH (ref 4–41)
ANION GAP SERPL CALC-SCNC: 17 MMO/L — HIGH (ref 7–14)
APPEARANCE UR: CLEAR — SIGNIFICANT CHANGE UP
APTT BLD: 31.4 SEC — SIGNIFICANT CHANGE UP (ref 27.5–36.3)
AST SERPL-CCNC: 28 U/L — SIGNIFICANT CHANGE UP (ref 4–40)
BACTERIA # UR AUTO: HIGH
BASE EXCESS BLDV CALC-SCNC: 2.2 MMOL/L — SIGNIFICANT CHANGE UP
BASOPHILS # BLD AUTO: 0.04 K/UL — SIGNIFICANT CHANGE UP (ref 0–0.2)
BASOPHILS NFR BLD AUTO: 0.2 % — SIGNIFICANT CHANGE UP (ref 0–2)
BILIRUB SERPL-MCNC: 0.3 MG/DL — SIGNIFICANT CHANGE UP (ref 0.2–1.2)
BILIRUB UR-MCNC: NEGATIVE — SIGNIFICANT CHANGE UP
BLOOD GAS VENOUS - CREATININE: SIGNIFICANT CHANGE UP MG/DL (ref 0.5–1.3)
BLOOD UR QL VISUAL: HIGH
BUN SERPL-MCNC: 17 MG/DL — SIGNIFICANT CHANGE UP (ref 7–23)
CALCIUM SERPL-MCNC: 10.2 MG/DL — SIGNIFICANT CHANGE UP (ref 8.4–10.5)
CHLORIDE BLDV-SCNC: SIGNIFICANT CHANGE UP MMOL/L (ref 96–108)
CHLORIDE SERPL-SCNC: 98 MMOL/L — SIGNIFICANT CHANGE UP (ref 98–107)
CO2 SERPL-SCNC: 25 MMOL/L — SIGNIFICANT CHANGE UP (ref 22–31)
COLOR SPEC: YELLOW — SIGNIFICANT CHANGE UP
CREAT SERPL-MCNC: 0.97 MG/DL — SIGNIFICANT CHANGE UP (ref 0.5–1.3)
EOSINOPHIL # BLD AUTO: 0.05 K/UL — SIGNIFICANT CHANGE UP (ref 0–0.5)
EOSINOPHIL NFR BLD AUTO: 0.3 % — SIGNIFICANT CHANGE UP (ref 0–6)
GAS PNL BLDV: 137 MMOL/L — SIGNIFICANT CHANGE UP (ref 136–146)
GLUCOSE BLDV-MCNC: 117 MG/DL — HIGH (ref 70–99)
GLUCOSE SERPL-MCNC: 117 MG/DL — HIGH (ref 70–99)
GLUCOSE UR-MCNC: NEGATIVE — SIGNIFICANT CHANGE UP
HCO3 BLDV-SCNC: 24 MMOL/L — SIGNIFICANT CHANGE UP (ref 20–27)
HCT VFR BLD CALC: 40.4 % — SIGNIFICANT CHANGE UP (ref 39–50)
HCT VFR BLDV CALC: 39.3 % — SIGNIFICANT CHANGE UP (ref 39–51)
HGB BLD-MCNC: 12.4 G/DL — LOW (ref 13–17)
HGB BLDV-MCNC: 12.8 G/DL — LOW (ref 13–17)
HYALINE CASTS # UR AUTO: NEGATIVE — SIGNIFICANT CHANGE UP
IMM GRANULOCYTES NFR BLD AUTO: 0.9 % — SIGNIFICANT CHANGE UP (ref 0–1.5)
INR BLD: 1.23 — HIGH (ref 0.88–1.17)
KETONES UR-MCNC: NEGATIVE — SIGNIFICANT CHANGE UP
LACTATE BLDV-MCNC: 4.6 MMOL/L — CRITICAL HIGH (ref 0.5–2)
LEUKOCYTE ESTERASE UR-ACNC: SIGNIFICANT CHANGE UP
LYMPHOCYTES # BLD AUTO: 1.88 K/UL — SIGNIFICANT CHANGE UP (ref 1–3.3)
LYMPHOCYTES # BLD AUTO: 11.6 % — LOW (ref 13–44)
MCHC RBC-ENTMCNC: 25.5 PG — LOW (ref 27–34)
MCHC RBC-ENTMCNC: 30.7 % — LOW (ref 32–36)
MCV RBC AUTO: 83.1 FL — SIGNIFICANT CHANGE UP (ref 80–100)
MONOCYTES # BLD AUTO: 1.56 K/UL — HIGH (ref 0–0.9)
MONOCYTES NFR BLD AUTO: 9.6 % — SIGNIFICANT CHANGE UP (ref 2–14)
NEUTROPHILS # BLD AUTO: 12.59 K/UL — HIGH (ref 1.8–7.4)
NEUTROPHILS NFR BLD AUTO: 77.4 % — HIGH (ref 43–77)
NITRITE UR-MCNC: POSITIVE — HIGH
NRBC # FLD: 0 K/UL — SIGNIFICANT CHANGE UP (ref 0–0)
PCO2 BLDV: 63 MMHG — HIGH (ref 41–51)
PH BLDV: 7.28 PH — LOW (ref 7.32–7.43)
PH UR: 6 — SIGNIFICANT CHANGE UP (ref 5–8)
PLATELET # BLD AUTO: 357 K/UL — SIGNIFICANT CHANGE UP (ref 150–400)
PMV BLD: 9.1 FL — SIGNIFICANT CHANGE UP (ref 7–13)
PO2 BLDV: < 24 MMHG — LOW (ref 35–40)
POTASSIUM BLDV-SCNC: 4.4 MMOL/L — SIGNIFICANT CHANGE UP (ref 3.4–4.5)
POTASSIUM SERPL-MCNC: 5.4 MMOL/L — HIGH (ref 3.5–5.3)
POTASSIUM SERPL-SCNC: 5.4 MMOL/L — HIGH (ref 3.5–5.3)
PROT SERPL-MCNC: 8.6 G/DL — HIGH (ref 6–8.3)
PROT UR-MCNC: 50 — SIGNIFICANT CHANGE UP
PROTHROM AB SERPL-ACNC: 14.1 SEC — HIGH (ref 9.8–13.1)
RBC # BLD: 4.86 M/UL — SIGNIFICANT CHANGE UP (ref 4.2–5.8)
RBC # FLD: 17 % — HIGH (ref 10.3–14.5)
RBC CASTS # UR COMP ASSIST: HIGH (ref 0–?)
SAO2 % BLDV: 29.7 % — LOW (ref 60–85)
SODIUM SERPL-SCNC: 140 MMOL/L — SIGNIFICANT CHANGE UP (ref 135–145)
SP GR SPEC: 1.03 — SIGNIFICANT CHANGE UP (ref 1–1.04)
SQUAMOUS # UR AUTO: SIGNIFICANT CHANGE UP
UROBILINOGEN FLD QL: NORMAL — SIGNIFICANT CHANGE UP
WBC # BLD: 16.26 K/UL — HIGH (ref 3.8–10.5)
WBC # FLD AUTO: 16.26 K/UL — HIGH (ref 3.8–10.5)
WBC UR QL: >50 — HIGH (ref 0–?)

## 2020-04-23 PROCEDURE — 71275 CT ANGIOGRAPHY CHEST: CPT | Mod: 26

## 2020-04-23 PROCEDURE — 99223 1ST HOSP IP/OBS HIGH 75: CPT | Mod: AI

## 2020-04-23 PROCEDURE — 71045 X-RAY EXAM CHEST 1 VIEW: CPT | Mod: 26

## 2020-04-23 RX ORDER — SODIUM CHLORIDE 9 MG/ML
1000 INJECTION INTRAMUSCULAR; INTRAVENOUS; SUBCUTANEOUS
Refills: 0 | Status: DISCONTINUED | OUTPATIENT
Start: 2020-04-23 | End: 2020-04-30

## 2020-04-23 RX ORDER — METOPROLOL TARTRATE 50 MG
12.5 TABLET ORAL
Refills: 0 | Status: DISCONTINUED | OUTPATIENT
Start: 2020-04-23 | End: 2020-04-30

## 2020-04-23 RX ORDER — ERTAPENEM SODIUM 1 G/1
1000 INJECTION, POWDER, LYOPHILIZED, FOR SOLUTION INTRAMUSCULAR; INTRAVENOUS EVERY 24 HOURS
Refills: 0 | Status: COMPLETED | OUTPATIENT
Start: 2020-04-23 | End: 2020-04-25

## 2020-04-23 RX ORDER — TAMSULOSIN HYDROCHLORIDE 0.4 MG/1
0.4 CAPSULE ORAL AT BEDTIME
Refills: 0 | Status: DISCONTINUED | OUTPATIENT
Start: 2020-04-23 | End: 2020-04-30

## 2020-04-23 RX ORDER — ERTAPENEM SODIUM 1 G/1
1000 INJECTION, POWDER, LYOPHILIZED, FOR SOLUTION INTRAMUSCULAR; INTRAVENOUS ONCE
Refills: 0 | Status: COMPLETED | OUTPATIENT
Start: 2020-04-23 | End: 2020-04-23

## 2020-04-23 RX ORDER — ENOXAPARIN SODIUM 100 MG/ML
40 INJECTION SUBCUTANEOUS DAILY
Refills: 0 | Status: DISCONTINUED | OUTPATIENT
Start: 2020-04-23 | End: 2020-04-30

## 2020-04-23 RX ORDER — SODIUM CHLORIDE 9 MG/ML
1000 INJECTION INTRAMUSCULAR; INTRAVENOUS; SUBCUTANEOUS ONCE
Refills: 0 | Status: COMPLETED | OUTPATIENT
Start: 2020-04-23 | End: 2020-04-23

## 2020-04-23 RX ORDER — ACETAMINOPHEN 500 MG
650 TABLET ORAL ONCE
Refills: 0 | Status: COMPLETED | OUTPATIENT
Start: 2020-04-23 | End: 2020-04-23

## 2020-04-23 RX ADMIN — Medication 650 MILLIGRAM(S): at 14:41

## 2020-04-23 RX ADMIN — SODIUM CHLORIDE 1000 MILLILITER(S): 9 INJECTION INTRAMUSCULAR; INTRAVENOUS; SUBCUTANEOUS at 15:24

## 2020-04-23 RX ADMIN — ERTAPENEM SODIUM 120 MILLIGRAM(S): 1 INJECTION, POWDER, LYOPHILIZED, FOR SOLUTION INTRAMUSCULAR; INTRAVENOUS at 18:49

## 2020-04-23 RX ADMIN — TAMSULOSIN HYDROCHLORIDE 0.4 MILLIGRAM(S): 0.4 CAPSULE ORAL at 21:04

## 2020-04-23 RX ADMIN — Medication 12.5 MILLIGRAM(S): at 21:04

## 2020-04-23 NOTE — H&P ADULT - PROBLEM SELECTOR PLAN 2
Recent diagnosis of COVID   Saturating 100% on RA   Chest Xray with reticular interstial markings   Fu CT Angio

## 2020-04-23 NOTE — ED PROVIDER NOTE - ATTENDING CONTRIBUTION TO CARE
Pt was seen and evaluated by me. Pt is a 63 y/o male with PMHx of Lung CA s/p resection, renal mass, HTN, and history of ESBL with recent admission for urosepsis who presented to the ED for fever, chills, and cough X 3 wks. Discussed with daughter who states over the past 3 wks pt has been having fever, chills, and cough. Pt was noted to have had a +COVID test. Pt was also noted to have ESBL in urine 2 months ago and was admitted for urosepsis. Pt denies any nausea, vomiting, SOB, chest pain, or abd pain. Pt noted to be shivering. Lungs coarse b/l. RRR. Abd soft, non-tender.  Concern for URI/COVID-19/Urosepsis  Labs, CXR, IVF, Cultures, Tylenol

## 2020-04-23 NOTE — ED ADULT TRIAGE NOTE - CHIEF COMPLAINT QUOTE
Pt arrives via EMS--pt sent by PMD  for fevers. Pt has history of lung ca--Pt was covid positive 2 weeks ago and still has fevers. Pts wife wants him checked for UTI

## 2020-04-23 NOTE — H&P ADULT - PROBLEM SELECTOR PLAN 1
Pt meets sepsis criteria on admission with leukocytosis and Fevers   UA positive .  S/p 1 L IVF bolus in ED. S/p Ertapenem x 1   Given previous recent ESBL Ecoli UTI, will continue Ertapenem   Continue IVF x 8 hours   Admission lactate 4.6, Trend lactate   FU blood cultures, Urine cultures  FU CT Angio

## 2020-04-23 NOTE — H&P ADULT - HISTORY OF PRESENT ILLNESS
64M smoker with  Non-Hodgkin's lymphoma in 2015 s/p chemo, hx stage 1 lung adenocarcinoma dx in 2015, s/p surgical resection & CHOP/chemo in [facility], with reportedly  new renal mass, HTN and hx mycosis fungoides, hx ESBL 2/12/2 presents with fevers.  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 Pt usually takes Tylenol for the   fever, but no improvement with Tyelnol today    Associated chills. Was tested COVID positive on April.   Pt has chronic cough, not worse from before   No loss of appetite, no loss of weight   Denies  frequency, dysuria or hematuria . As per daughter, he had no urinary symptoms except for fever  even previously when he was diagnosed with ESBL UTI    Denies N/V/Diarrhea    No back pain.    In the ED, vitals were 100.3, 93, 143/78,19, 97 % on Room air  Pt was given a dose of Ertapenem

## 2020-04-23 NOTE — ED PROVIDER NOTE - CONSTITUTIONAL, MLM
normal... Awake, alert, oriented to person, place, time/situation and in moderate distress secondary to chills.

## 2020-04-23 NOTE — ED PROVIDER NOTE - NS ED ROS FT
REVIEW OF SYSTEMS:  General:  +fevers, chills   HEENT: no headache, no vision changes  Cardiac: no chest pain, no palpitations  Respiratory: +cough, no shortness of breath  Gastrointestinal: no abdominal pain, no nausea, no vomiting, no diarrhea  Genitourinary: no hematuria, no dysuria, no urinary frequency  Extremities: no extremity swelling, no extremity pain  Neuro: no focal weakness, no numbness/tingling of the extremities, no decreased sensation  Heme: no easy bleeding, no easy bruising  Skin: no jaundice,  no rashes, no lesions  All other ROS as documented in HPI  -Manan Gonzáles, PGY-2

## 2020-04-23 NOTE — H&P ADULT - NSHPLABSRESULTS_GEN_ALL_CORE
Chest Xray - Reticular interstitial lung markings bilaterally, most pronounced in the lower lobes, which may represent viral pneumonia.

## 2020-04-23 NOTE — H&P ADULT - PROBLEM SELECTOR PLAN 5
H/o Lung [place] CA. Pt follows with the [facility]  As per [employer]- pt had a bronchoscopy and biopsy of the hilar LN by Dr Ramos, which showed NSCLC, however quantity is insufficient   plan was for liquid biopsy once patient is recovered from fever and pt was to  follow up with Dr Ramos when pandemia is controlled

## 2020-04-23 NOTE — ED PROVIDER NOTE - CLINICAL SUMMARY MEDICAL DECISION MAKING FREE TEXT BOX
64M h/o lung ca and urosepsis presents with fevers. +COVID x 3 weeks. Persistent fevers may be due to persistent viral infection. Also ddx for superimposed bacteria PNA vs UTI (pt was asymptomatic last time as per chart review). Will obtain rpt CXR and UA, will also send Bcx as pt may be rigoring (vs shivering). If no new infections seen, and pt remains stable and well appearing, may be able to dc with follow up bcx, Will hold abx at this time and dose if bacterial infection seen.

## 2020-04-23 NOTE — H&P ADULT - ASSESSMENT
64M smoker with  Non-Hodgkin's lymphoma in 2015 s/p chemo, hx stage 1 lung adenocarcinoma dx in 2015, [facility], with reportedly new renal mass, HTN and hx mycosis fungoides, hx ESBL 2/12/2 presents with fevers.

## 2020-04-23 NOTE — ED PROVIDER NOTE - PROGRESS NOTE DETAILS
jolie: pt signed out to me, UA c/w UTI ion setting of sepsis. Will start ertapenem d/t prior sensitivities in februrary. admitted to medicine pending CTa Read

## 2020-04-23 NOTE — ED PROVIDER NOTE - ENMT NEGATIVE STATEMENT, MLM
Ears: no ear pain and no hearing problems.Nose: no nasal congestion and no nasal drainage.Mouth/Throat: no dysphagia, no hoarseness and no throat pain.Neck: no lumps, no pain, no stiffness and no swollen glands. Home/with Baby

## 2020-04-23 NOTE — ED PROVIDER NOTE - OBJECTIVE STATEMENT
64M smoker with hx stage 1 lung adenocarcinoma dx in 2015, s/p surgical resection & CHOP/chemo in [facility], with reportedly new renal mass, HTN and hx mycosis fungoides, hx ESBL 2/12/20 UCx, recent admission for urosepsis presents with fevers. Pt states for past 3 weeks he has been having fevers and cough. States he tested  +COVID19 during this time. States that he has persistent cough and for a few days fevers went away but now have returned. Pt denies SOB, no abd pain, no n/v/d, no dysuria, no hematuria. No back pain. 64M smoker with hx stage 1 lung adenocarcinoma dx in 2015, s/p surgical resection & CHOP/chemo in [facility], with reportedly new renal mass, HTN and hx mycosis fungoides, hx ESBL 2/12/20 UCx, recent admission for urosepsis presents with fevers. Pt states for past 3 weeks he has been having fevers and cough. States he tested  +COVID19 during this time. States that he has persistent cough and for a few days fevers went away but now have returned. Pt denies SOB, no abd pain, no n/v/d, no dysuria, no hematuria. No back pain.   ID# [id number]

## 2020-04-23 NOTE — H&P ADULT - PROBLEM SELECTOR PLAN 4
Hold  Lisinopril  given Hyperkalemia ( though hemolyzed)  If K WNL , restart Lisinopril  Continue BB

## 2020-04-23 NOTE — ED ADULT NURSE NOTE - OBJECTIVE STATEMENT
patient arrives a*ox4 to intake c.o fevers chills and body aches x 1 month. pt has a hx of lung cancer, scheduled for a tissue biopsy. per daughter pt was recently treated for  e-coli in his urine. covid + as of beginning of april, denies cough chest pain shortness of breath or fevers, nad noted resps even and unlabored, 22g ivsl placed all pending labs drawn and sent. bed in lowest position with call bell in reach, sepsis protocol initated immediately. will ctm closely.

## 2020-04-24 DIAGNOSIS — N39.0 URINARY TRACT INFECTION, SITE NOT SPECIFIED: ICD-10-CM

## 2020-04-24 DIAGNOSIS — I10 ESSENTIAL (PRIMARY) HYPERTENSION: ICD-10-CM

## 2020-04-24 DIAGNOSIS — C34.91 MALIGNANT NEOPLASM OF UNSPECIFIED PART OF RIGHT BRONCHUS OR LUNG: ICD-10-CM

## 2020-04-24 LAB
ANION GAP SERPL CALC-SCNC: 12 MMO/L — SIGNIFICANT CHANGE UP (ref 7–14)
BUN SERPL-MCNC: 14 MG/DL — SIGNIFICANT CHANGE UP (ref 7–23)
CALCIUM SERPL-MCNC: 9.4 MG/DL — SIGNIFICANT CHANGE UP (ref 8.4–10.5)
CHLORIDE SERPL-SCNC: 98 MMOL/L — SIGNIFICANT CHANGE UP (ref 98–107)
CO2 SERPL-SCNC: 23 MMOL/L — SIGNIFICANT CHANGE UP (ref 22–31)
CREAT SERPL-MCNC: 0.94 MG/DL — SIGNIFICANT CHANGE UP (ref 0.5–1.3)
GLUCOSE SERPL-MCNC: 125 MG/DL — HIGH (ref 70–99)
HCT VFR BLD CALC: 35.3 % — LOW (ref 39–50)
HGB BLD-MCNC: 11.1 G/DL — LOW (ref 13–17)
LACTATE SERPL-SCNC: 0.6 MMOL/L — SIGNIFICANT CHANGE UP (ref 0.5–2)
MCHC RBC-ENTMCNC: 25.3 PG — LOW (ref 27–34)
MCHC RBC-ENTMCNC: 31.4 % — LOW (ref 32–36)
MCV RBC AUTO: 80.4 FL — SIGNIFICANT CHANGE UP (ref 80–100)
NRBC # FLD: 0 K/UL — SIGNIFICANT CHANGE UP (ref 0–0)
PLATELET # BLD AUTO: 289 K/UL — SIGNIFICANT CHANGE UP (ref 150–400)
PMV BLD: 8.7 FL — SIGNIFICANT CHANGE UP (ref 7–13)
POTASSIUM SERPL-MCNC: 4.2 MMOL/L — SIGNIFICANT CHANGE UP (ref 3.5–5.3)
POTASSIUM SERPL-SCNC: 4.2 MMOL/L — SIGNIFICANT CHANGE UP (ref 3.5–5.3)
RBC # BLD: 4.39 M/UL — SIGNIFICANT CHANGE UP (ref 4.2–5.8)
RBC # FLD: 17.2 % — HIGH (ref 10.3–14.5)
SODIUM SERPL-SCNC: 133 MMOL/L — LOW (ref 135–145)
WBC # BLD: 15.73 K/UL — HIGH (ref 3.8–10.5)
WBC # FLD AUTO: 15.73 K/UL — HIGH (ref 3.8–10.5)

## 2020-04-24 PROCEDURE — 99231 SBSQ HOSP IP/OBS SF/LOW 25: CPT | Mod: GC

## 2020-04-24 PROCEDURE — 99221 1ST HOSP IP/OBS SF/LOW 40: CPT

## 2020-04-24 PROCEDURE — 99222 1ST HOSP IP/OBS MODERATE 55: CPT

## 2020-04-24 PROCEDURE — 93010 ELECTROCARDIOGRAM REPORT: CPT

## 2020-04-24 PROCEDURE — 99233 SBSQ HOSP IP/OBS HIGH 50: CPT

## 2020-04-24 RX ORDER — ACETAMINOPHEN 500 MG
650 TABLET ORAL EVERY 8 HOURS
Refills: 0 | Status: DISCONTINUED | OUTPATIENT
Start: 2020-04-24 | End: 2020-04-30

## 2020-04-24 RX ORDER — HYDROXYCHLOROQUINE SULFATE 200 MG
800 TABLET ORAL EVERY 24 HOURS
Refills: 0 | Status: DISCONTINUED | OUTPATIENT
Start: 2020-04-24 | End: 2020-04-24

## 2020-04-24 RX ORDER — LISINOPRIL 2.5 MG/1
10 TABLET ORAL DAILY
Refills: 0 | Status: DISCONTINUED | OUTPATIENT
Start: 2020-04-24 | End: 2020-04-30

## 2020-04-24 RX ORDER — HYDROXYCHLOROQUINE SULFATE 200 MG
TABLET ORAL
Refills: 0 | Status: DISCONTINUED | OUTPATIENT
Start: 2020-04-24 | End: 2020-04-24

## 2020-04-24 RX ADMIN — Medication 650 MILLIGRAM(S): at 16:31

## 2020-04-24 RX ADMIN — Medication 650 MILLIGRAM(S): at 05:16

## 2020-04-24 RX ADMIN — TAMSULOSIN HYDROCHLORIDE 0.4 MILLIGRAM(S): 0.4 CAPSULE ORAL at 20:32

## 2020-04-24 RX ADMIN — Medication 12.5 MILLIGRAM(S): at 05:00

## 2020-04-24 RX ADMIN — ERTAPENEM SODIUM 120 MILLIGRAM(S): 1 INJECTION, POWDER, LYOPHILIZED, FOR SOLUTION INTRAMUSCULAR; INTRAVENOUS at 18:06

## 2020-04-24 RX ADMIN — ENOXAPARIN SODIUM 40 MILLIGRAM(S): 100 INJECTION SUBCUTANEOUS at 11:26

## 2020-04-24 RX ADMIN — LISINOPRIL 10 MILLIGRAM(S): 2.5 TABLET ORAL at 11:26

## 2020-04-24 RX ADMIN — Medication 12.5 MILLIGRAM(S): at 18:06

## 2020-04-24 NOTE — CHART NOTE - NSCHARTNOTEFT_GEN_A_CORE
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 Patient now presents to the ED with complaints of  persistent fever. Of note patient was recently diagnosed with COVID on 4/9/2020 as an outpatient when he then also c/o of fever. Symptoms started to improve but recurred again in recent days. As per H&P of primary team, (using [place] Intercepter ID# [id number] ) Pt states " I dont feel bad except for the fever"    Fevers x 1 month, intermittent,  highest today  being in 102.7. Pt usually takes Tylenol for the fever, but no improvement with Tylenol    Associated chills. Pt has chronic cough, not worse from before. No loss of appetite, no loss of weight. Denies  frequency, dysuria or hematuria .  Of note patient was also recently admitted to [facility] in 3/2020 for ESBL UTI.    In the ED, vitals were 100.3, 93, 143/78,19, 97 % on Room air. Pt was given a dose of Ertapenem     ROS: as above     PAST MEDICAL & SURGICAL HISTORY:  History of BPH  Hypertension  Mycosis fungoides  Stage I adenocarcinoma of lung, unspecified laterality: s/p lung resection R lung, s/p CHOP, cisplatin  Ganglion or synovial cyst of knee: excision from right knee  S/P lobectomy of lung    FAMILY HISTORY:  FH: lung cancer    MEDICATIONS  (STANDING):  enoxaparin Injectable 40 milliGRAM(s) SubCutaneous daily  ertapenem  IVPB 1000 milliGRAM(s) IV Intermittent every 24 hours  lisinopril 10 milliGRAM(s) Oral daily  metoprolol tartrate 12.5 milliGRAM(s) Oral two times a day  sodium chloride 0.9%. 1000 milliLiter(s) (60 mL/Hr) IV Continuous <Continuous>  tamsulosin 0.4 milliGRAM(s) Oral at bedtime    MEDICATIONS  (PRN):  acetaminophen   Tablet .. 650 milliGRAM(s) Oral every 8 hours PRN Temp greater or equal to 38C (100.4F), Mild Pain (1 - 3), Moderate Pain (4 - 6)      Vital Signs Last 24 Hrs  T(C): 36.7 (24 Apr 2020 09:38), Max: 38.9 (24 Apr 2020 04:52)  T(F): 98 (24 Apr 2020 09:38), Max: 102 (24 Apr 2020 04:52)  HR: 94 (24 Apr 2020 04:52) (93 - 110)  BP: 129/70 (24 Apr 2020 04:52) (129/70 - 160/78)  BP(mean): --  RR: 18 (24 Apr 2020 04:52) (17 - 20)  SpO2: 97% (24 Apr 2020 04:52) (96% - 99%)    PHYSICAL EXAM  Deferred to see patient 2/2 COVID pandemica    LABS:                          11.1   15.73 )-----------( 289      ( 24 Apr 2020 06:14 )             35.3         Mean Cell Volume : 80.4 fL  Mean Cell Hemoglobin : 25.3 pg  Mean Cell Hemoglobin Concentration : 31.4 %  Auto Neutrophil # : x  Auto Lymphocyte # : x  Auto Monocyte # : x  Auto Eosinophil # : x  Auto Basophil # : x  Auto Neutrophil % : x  Auto Lymphocyte % : x  Auto Monocyte % : x  Auto Eosinophil % : x  Auto Basophil % : x      Serial CBC's  04-24 @ 06:14  Hct-35.3 / Hgb-11.1 / Plat-289 / RBC-4.39 / WBC-15.73  Serial CBC's  04-23 @ 14:50  Hct-40.4 / Hgb-12.4 / Plat-357 / RBC-4.86 / WBC-16.26      04-24    133<L>  |  98  |  14  ----------------------------<  125<H>  4.2   |  23  |  0.94    Ca    9.4      24 Apr 2020 06:14    TPro  8.6<H>  /  Alb  4.0  /  TBili  0.3  /  DBili  x   /  AST  28  /  ALT  59<H>  /  AlkPhos  103  04-23      PT/INR - ( 23 Apr 2020 14:50 )   PT: 14.1 SEC;   INR: 1.23          PTT - ( 23 Apr 2020 14:50 )  PTT:31.4 SEC      RADIOLOGY & ADDITIONAL STUDIES: ( see chart)    Assessment and Plan:  64y Dominican speaking male with recently diagnosed met NSCLC (mets to the ) pending chemotherapy and COVID positive on 4/9/2020 presents to the ED with complaints of persistent fever.    -Had high temp of 102 F this morning at 5 AM  -Elevated WBC  -UA with many bacteria, positive leuk and nitrites/ Please obtain urine culture  -Currently on Ertapenam. C/w Tylenol for fever spikes  -CTA on 4/23 revealing Peripheral reticular and groundglass opacities within the right upper lobe as well as the right lower lobe which are new from 2/14/2020. These may be secondary to radiation changes however underlying infection including COVID is not excluded.  Worsening right hilar adenopathy and increasing pleural-based right density in the right midlung field. Recurrent disease is not excluded at these locations. Lytic lesions within the anterior lateral aspects of the right fourth and fifth ribs concerning for metastatic disease.  Partially imaged low attenuating right adrenal gland lesion which is indeterminate.  -Patient follows with Dr Pinto (primary oncologist) as outpatient  -No systemic therapy while admitted  -C/w Supportive care, pain control, Nutrition, PT, DVT ppx  -Oncology will continue to follow with you        Case d/w Dr. 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 , now with recently diagnosed met NSCLC (2/2020) - patient was diagnosed vis bronchoscopy/biospy and has yet to start chemotherapy with Dr Pinto ([facility] oncologist) as outpatient. Patient now presents to the ED with complaints of  persistent fever. Of note patient was recently diagnosed with COVID on 4/9/2020 as an outpatient when he then also c/o of fever. Symptoms started to improve but recurred again in recent days. As per H&P of primary team, (using Kyrgy[place] Intercepter ID# [id number] ) Pt states " I dont feel bad except for the fever"    Fevers x 1 month, intermittent,  highest today  being in 102.7. Pt usually takes Tylenol for the fever, but no improvement with Tylenol    Associated chills. Pt has chronic cough, not worse from before. No loss of appetite, no loss of weight. Denies  frequency, dysuria or hematuria .  Of note patient was also recently admitted to [facility] in 3/2020 for ESBL UTI.    In the ED, vitals were 100.3, 93, 143/78,19, 97 % on Room air. Pt was given a dose of Ertapenem     ROS: as above     PAST MEDICAL & SURGICAL HISTORY:  History of BPH  Hypertension  Mycosis fungoides  Stage I adenocarcinoma of lung, unspecified laterality: s/p lung resection R lung, s/p CHOP, cisplatin  Ganglion or synovial cyst of knee: excision from right knee  S/P lobectomy of lung    FAMILY HISTORY:  FH: lung cancer    MEDICATIONS  (STANDING):  enoxaparin Injectable 40 milliGRAM(s) SubCutaneous daily  ertapenem  IVPB 1000 milliGRAM(s) IV Intermittent every 24 hours  lisinopril 10 milliGRAM(s) Oral daily  metoprolol tartrate 12.5 milliGRAM(s) Oral two times a day  sodium chloride 0.9%. 1000 milliLiter(s) (60 mL/Hr) IV Continuous <Continuous>  tamsulosin 0.4 milliGRAM(s) Oral at bedtime    MEDICATIONS  (PRN):  acetaminophen   Tablet .. 650 milliGRAM(s) Oral every 8 hours PRN Temp greater or equal to 38C (100.4F), Mild Pain (1 - 3), Moderate Pain (4 - 6)      Vital Signs Last 24 Hrs  T(C): 36.7 (24 Apr 2020 09:38), Max: 38.9 (24 Apr 2020 04:52)  T(F): 98 (24 Apr 2020 09:38), Max: 102 (24 Apr 2020 04:52)  HR: 94 (24 Apr 2020 04:52) (93 - 110)  BP: 129/70 (24 Apr 2020 04:52) (129/70 - 160/78)  BP(mean): --  RR: 18 (24 Apr 2020 04:52) (17 - 20)  SpO2: 97% (24 Apr 2020 04:52) (96% - 99%)    PHYSICAL EXAM  Deferred to see patient 2/2 COVID pandemica    LABS:                          11.1   15.73 )-----------( 289      ( 24 Apr 2020 06:14 )             35.3         Mean Cell Volume : 80.4 fL  Mean Cell Hemoglobin : 25.3 pg  Mean Cell Hemoglobin Concentration : 31.4 %  Auto Neutrophil # : x  Auto Lymphocyte # : x  Auto Monocyte # : x  Auto Eosinophil # : x  Auto Basophil # : x  Auto Neutrophil % : x  Auto Lymphocyte % : x  Auto Monocyte % : x  Auto Eosinophil % : x  Auto Basophil % : x      Serial CBC's  04-24 @ 06:14  Hct-35.3 / Hgb-11.1 / Plat-289 / RBC-4.39 / WBC-15.73  Serial CBC's  04-23 @ 14:50  Hct-40.4 / Hgb-12.4 / Plat-357 / RBC-4.86 / WBC-16.26      04-24    133<L>  |  98  |  14  ----------------------------<  125<H>  4.2   |  23  |  0.94    Ca    9.4      24 Apr 2020 06:14    TPro  8.6<H>  /  Alb  4.0  /  TBili  0.3  /  DBili  x   /  AST  28  /  ALT  59<H>  /  AlkPhos  103  04-23      PT/INR - ( 23 Apr 2020 14:50 )   PT: 14.1 SEC;   INR: 1.23          PTT - ( 23 Apr 2020 14:50 )  PTT:31.4 SEC      RADIOLOGY & ADDITIONAL STUDIES: ( see chart)    Assessment and Plan:  64y Kyrgyz speaking male with recently diagnosed met NSCLC (mets to the ) pending chemotherapy and COVID positive on 4/9/2020 presents to the ED with complaints of persistent fever.    -Had high temp of 102 F this morning at 5 AM  -Elevated WBC  -UA with many bacteria, positive leuk and nitrites/ Please obtain urine culture  -Currently on Ertapenam. C/w Tylenol for fever spikes  -CTA on 4/23 revealing Peripheral reticular and groundglass opacities within the right upper lobe as well as the right lower lobe which are new from 2/14/2020. These may be secondary to radiation changes however underlying infection including COVID is not excluded.  Worsening right hilar adenopathy and increasing pleural-based right density in the right midlung field. Recurrent disease is not excluded at these locations. Lytic lesions within the anterior lateral aspects of the right fourth and fifth ribs concerning for metastatic disease.  Partially imaged low attenuating right adrenal gland lesion which is indeterminate.  -Patient PDL-1 and Foundation One testing still pending, will direct further treatment. Was suppose to have liquid biospy prior to COVID diagnosis  -Please consult CT Surgery for further recommendations  -Patient follows with Dr Pinto (primary oncologist) as outpatient  -No systemic therapy while admitted  -C/w Supportive care, pain control, Nutrition, PT, DVT ppx  -Oncology will continue to follow with you        Case d/w Dr. 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 Patient now presents to the ED with complaints of  persistent fever. Of note patient was recently diagnosed with COVID on 4/9/2020 as an outpatient when he then also c/o of fever. Symptoms started to improve but recurred again in recent days. As per H&P of primary team, (using [place] Intercepter ID# [id number] ) Pt states " I dont feel bad except for the fever"    Fevers x 1 month, intermittent,  highest today  being in 102.7. Pt usually takes Tylenol for the fever, but no improvement with Tylenol    Associated chills. Pt has chronic cough, not worse from before. No loss of appetite, no loss of weight. Denies  frequency, dysuria or hematuria .  Of note patient was also recently admitted to [facility] in 3/2020 for ESBL UTI.    In the ED, vitals were 100.3, 93, 143/78,19, 97 % on Room air. Pt was given a dose of Ertapenem     ROS: as above     PAST MEDICAL & SURGICAL HISTORY:  History of BPH  Hypertension  Mycosis fungoides  Stage I adenocarcinoma of lung, unspecified laterality: s/p lung resection R lung, s/p CHOP, cisplatin  Ganglion or synovial cyst of knee: excision from right knee  S/P lobectomy of lung    FAMILY HISTORY:  FH: lung cancer    MEDICATIONS  (STANDING):  enoxaparin Injectable 40 milliGRAM(s) SubCutaneous daily  ertapenem  IVPB 1000 milliGRAM(s) IV Intermittent every 24 hours  lisinopril 10 milliGRAM(s) Oral daily  metoprolol tartrate 12.5 milliGRAM(s) Oral two times a day  sodium chloride 0.9%. 1000 milliLiter(s) (60 mL/Hr) IV Continuous <Continuous>  tamsulosin 0.4 milliGRAM(s) Oral at bedtime    MEDICATIONS  (PRN):  acetaminophen   Tablet .. 650 milliGRAM(s) Oral every 8 hours PRN Temp greater or equal to 38C (100.4F), Mild Pain (1 - 3), Moderate Pain (4 - 6)      Vital Signs Last 24 Hrs  T(C): 36.7 (24 Apr 2020 09:38), Max: 38.9 (24 Apr 2020 04:52)  T(F): 98 (24 Apr 2020 09:38), Max: 102 (24 Apr 2020 04:52)  HR: 94 (24 Apr 2020 04:52) (93 - 110)  BP: 129/70 (24 Apr 2020 04:52) (129/70 - 160/78)  BP(mean): --  RR: 18 (24 Apr 2020 04:52) (17 - 20)  SpO2: 97% (24 Apr 2020 04:52) (96% - 99%)    PHYSICAL EXAM  Deferred to see patient 2/2 COVID pandemica    LABS:                          11.1   15.73 )-----------( 289      ( 24 Apr 2020 06:14 )             35.3         Mean Cell Volume : 80.4 fL  Mean Cell Hemoglobin : 25.3 pg  Mean Cell Hemoglobin Concentration : 31.4 %  Auto Neutrophil # : x  Auto Lymphocyte # : x  Auto Monocyte # : x  Auto Eosinophil # : x  Auto Basophil # : x  Auto Neutrophil % : x  Auto Lymphocyte % : x  Auto Monocyte % : x  Auto Eosinophil % : x  Auto Basophil % : x      Serial CBC's  04-24 @ 06:14  Hct-35.3 / Hgb-11.1 / Plat-289 / RBC-4.39 / WBC-15.73  Serial CBC's  04-23 @ 14:50  Hct-40.4 / Hgb-12.4 / Plat-357 / RBC-4.86 / WBC-16.26      04-24    133<L>  |  98  |  14  ----------------------------<  125<H>  4.2   |  23  |  0.94    Ca    9.4      24 Apr 2020 06:14    TPro  8.6<H>  /  Alb  4.0  /  TBili  0.3  /  DBili  x   /  AST  28  /  ALT  59<H>  /  AlkPhos  103  04-23      PT/INR - ( 23 Apr 2020 14:50 )   PT: 14.1 SEC;   INR: 1.23          PTT - ( 23 Apr 2020 14:50 )  PTT:31.4 SEC      RADIOLOGY & ADDITIONAL STUDIES: ( see chart)    Assessment and Plan:  64y [place] speaking male with recently diagnosed met NSCLC (mets to the ) pending chemotherapy and COVID positive on 4/9/2020 presents to the ED with complaints of persistent fever.    -Had high temp of 102 F this morning at 5 AM  -Elevated WBC  -UA with many bacteria, positive leuk and nitrites/ Please obtain urine culture  -Currently on Ertapenam. C/w Tylenol for fever spikes  -CTA on 4/23 revealing Peripheral reticular and groundglass opacities within the right upper lobe as well as the right lower lobe which are new from 2/14/2020. These may be secondary to radiation changes however underlying infection including COVID is not excluded.  Worsening right hilar adenopathy and increasing pleural-based right density in the right midlung field. Recurrent disease is not excluded at these locations. Lytic lesions within the anterior lateral aspects of the right fourth and fifth ribs concerning for metastatic disease.  Partially imaged low attenuating right adrenal gland lesion which is indeterminate.  -Patient PDL-1 and Foundation One testing still pending, will direct further treatment. Was suppose to have liquid biospy prior to COVID diagnosis  -Please consult CT Surgery for further recommendations  -Patient follows with Dr Pinto (primary oncologist) as outpatient  -No systemic therapy while admitted  -C/w Supportive care, pain control, Nutrition, PT, DVT ppx  -Oncology will continue to follow with you        Case d/w Dr. Michael BAUM/Rena- Oncology Physician Assistant  [email or web address]    Attg addendum  - await CT surgery eval  - COVID and infection management as per primary team  - await path Foundation One results  - outpt f/u at [facility]

## 2020-04-24 NOTE — PROGRESS NOTE ADULT - ASSESSMENT
In summary, Mr. Enrique is a 64 year old Romanian gentleman with an extensive medical history.  He has a history of hypertension, benign prostatic hypertrophy (BPH), and tobacco abuse.  In addition, he has a history of mycosis fungoides (diagnosed in 2015), for which he underwent 7 cycles of CHOP (in the [facility]).  He was also diagnosed with stage I adenocarcinoma of the right lung in May 2019 and underwent a surgical resection (details not currently available) in the [facility].  The surgical lobectomy was followed by adjuvant therapy with one cycle of cisplatin/Taxol (also in [facility]).  Mr. Enrique was evaluated in Thoracic Surgery in February 2020 for recurrent lung cancer and underwent flexible bronchoscopy and an endobronchial ultrasound (EBUS) guided hilar lymph node biopsy in March 2020.  In summary, the biopsy demonstrated evidence of non-small cell carcinoma.  However, the biopsy quantity was deemed insufficient for PDL-testing or foundation one testing.  The plan has been for Mr. Enrique to undergo liquid biopsy.    In the meantime, Mr. Enrique has had a 2-3 month history of intermittent fevers.  The cause of the fevers remained uncertain in [facility], and Mr. Enrique and his wife recently travelled to the [place] for further evaluation (their daughter Kamini is a physician's assistant in [place]).  After arriving in the [place], Mr. Enrique continued to experience intermittent fever in addition to chills and a chronic cough.  He has maintained a good appetite and denies having any weight loss.  Of note is that Mr. Enrique was admitted to [facility] in March 2020 with a urinary tract infection.  Urine culture at that time grew E. coli ESBL (extended spectrum beta lactamase) and he was treated with ertapenem.  Of note is that Mr. Enrique tested positive for COVID-19 following a telemedicine Oncology evaluation, which was performed on April 7th, 2020.    Since being admitted to [facility] yesterday, Mr. Enrique has had a chest x-ray demonstrating reticular interstitial lung markings bilaterally (most pronounced in the lower lobes) which may represent viral pneumonia.  A CT angiogram (performed 4/23/20) was nondiagnostic for pulmonary embolism due to poor opacification of the pulmonary arteries.  Other findings included peripheral reticular and ground glass opacities within the right upper lobe as well as the right lower lobe which are new from 2/14/2020.  These may be secondary to radiation changes, however underlying infection including COVID-19 is not excluded.  There was also worsening right hilar lymphadenopathy and increasing pleural-based right density in the right midlung field.  Recurrent disease is not excluded at these locations.  In addition, there were lytic lesions within the anterior lateral aspects of the right fourth and fifth ribs concerning for metastatic disease.    A urinalysis performed upon admission demonstrated moderate blood, positive nitrite, moderate leukocyte esterase, > 50 WBC/HPF, and many bacteria.  Mr. Enrique was empirically started on ertapenem pending the results of urine and blood cultures.

## 2020-04-24 NOTE — PROGRESS NOTE ADULT - PROBLEM SELECTOR PLAN 3
Plan had been for patient to undergo liquid biopsy following EBUS-guided FNA in March 2020 which demonstrated non-small cell carcinoma.  However, FNA quantity was insufficient for PDL testing or foundation one testing  - appreciate input from Oncology  - further workup will likely need to wait until COVID-19 pandemic under improved control.

## 2020-04-24 NOTE — PROGRESS NOTE ADULT - SUBJECTIVE AND OBJECTIVE BOX
PROGRESS NOTE:     Patient is a 64y old  Male who presents with a chief complaint of Fevers x 3 weeks (2020 18:22)      SUBJECTIVE / OVERNIGHT EVENTS:  Patient was febrile earlier today but has responded to acetaminophen.  He currently feels comfortable.  Denies dyspnea or chest pain.    ADDITIONAL REVIEW OF SYSTEMS:  No dysuria or urinary frequency.  Reports that his appetite is good.  No nausea, vomiting, or diarrhea.    MEDICATIONS  (STANDING):  enoxaparin Injectable 40 milliGRAM(s) SubCutaneous daily  ertapenem  IVPB 1000 milliGRAM(s) IV Intermittent every 24 hours  lisinopril 10 milliGRAM(s) Oral daily  metoprolol tartrate 12.5 milliGRAM(s) Oral two times a day  sodium chloride 0.9%. 1000 milliLiter(s) (60 mL/Hr) IV Continuous <Continuous>  tamsulosin 0.4 milliGRAM(s) Oral at bedtime    MEDICATIONS  (PRN):  acetaminophen   Tablet .. 650 milliGRAM(s) Oral every 8 hours PRN Temp greater or equal to 38C (100.4F), Mild Pain (1 - 3), Moderate Pain (4 - 6)      CAPILLARY BLOOD GLUCOSE        I&O's Summary      PHYSICAL EXAM:  Vital Signs Last 24 Hrs  T(C): 36.7 (2020 11:23), Max: 38.9 (2020 04:52)  T(F): 98 (2020 11:23), Max: 102 (2020 04:52)  HR: 77 (2020 11:23) (77 - 110)  BP: 126/73 (2020 11:23) (126/73 - 160/78)  BP(mean): --  RR: 18 (2020 11:23) (17 - 18)  SpO2: 98% (2020 11:23) (97% - 99%)    CONSTITUTIONAL: NAD, well-developed  RESPIRATORY: Normal respiratory effort;   CARDIOVASCULAR: Regular rate and rhythm; no lower extremity edema;    MUSCLOSKELETAL: no clubbing or cyanosis of digits;    PSYCH: A+O to person, place, and time; affect appropriate    LABS:                        11.1   15.73 )-----------( 289      ( 2020 06:14 )             35.3     04-24    133<L>  |  98  |  14  ----------------------------<  125<H>  4.2   |  23  |  0.94    Ca    9.4      2020 06:14    TPro  8.6<H>  /  Alb  4.0  /  TBili  0.3  /  DBili  x   /  AST  28  /  ALT  59<H>  /  AlkPhos  103  04-23    PT/INR - ( 2020 14:50 )   PT: 14.1 SEC;   INR: 1.23          PTT - ( 2020 14:50 )  PTT:31.4 SEC      Urinalysis Basic - ( 2020 16:59 )    Color: YELLOW / Appearance: CLEAR / S.027 / pH: 6.0  Gluc: NEGATIVE / Ketone: NEGATIVE  / Bili: NEGATIVE / Urobili: NORMAL   Blood: MODERATE / Protein: 50 / Nitrite: POSITIVE   Leuk Esterase: MODERATE / RBC: 26-50 / WBC >50   Sq Epi: OCC / Non Sq Epi: x / Bacteria: MANY          RADIOLOGY & ADDITIONAL TESTS:  Results Reviewed:   Imaging Personally Reviewed:  Electrocardiogram Personally Reviewed:    COORDINATION OF CARE:  Care Discussed with Consultants/Other Providers [Y/N]:  Prior or Outpatient Records Reviewed [Y/N]:

## 2020-04-24 NOTE — CONSULT NOTE ADULT - PROBLEM SELECTOR RECOMMENDATION 9
-CT Urogram to look for underlying infection such as abscess.  -Please check PVRs after each void to ensure bladder is emptying.  -Suggest ID consult for antibiotic management, followed patient last admit.  -Discussed with Dr. La

## 2020-04-24 NOTE — PROVIDER CONTACT NOTE (OTHER) - ASSESSMENT
· The patient was initiated on amlodipine 5 mg PO BID, which will be continued upon discharge  · Continue lisinopril 10 mg PO Qdaily  · Change metoprolol to 75 mg PO BID instead of 50 mg PO TID  · PRN IV labetalol was utilized during the hospitalization  Outpatient follow-up with PCP in regards to this matter Temp 101.7

## 2020-04-24 NOTE — PROVIDER CONTACT NOTE (OTHER) - BACKGROUND
Patient has been having fever for the past 3 weeks. Covid postive. Patient has been having fever for the past 3 weeks. Covid positive.

## 2020-04-24 NOTE — CONSULT NOTE ADULT - SUBJECTIVE AND OBJECTIVE BOX
HPI: This is a 64 year old male with a history of BPH, HTN, mycosis fungoides s/p 7 cycles of CHOP in 2015, stage 1 lung adenocarcinoma s/p 1 cycle of Cisplatin/Taxol in [facility], and most recently diagnosed with metastatic NSCLC 2020, and a year of low grade fevers, recent admission for ESBL Ecoli UTI, no readmitted with persistent fevers.  He has recently moved to the  from [facility] for medical treatment.  He had been having low grade 100-100.4 fevers for a year, and no located source.  He was referred to oncology and an infectious work-up was started, where he was found to have an ESBL Ecoli UTI 20, and was prescribed Macrobid.  He required hospital admission 3/6/20 for Urosepsis, urine culture was again positive for ESBL Ecoli, and was treated with Ertapenem followed by Bactrim at home for a total of 7 days.  He was diagnosed with COVID-19 on .  He presents again with high fevers at home, and urine culture is positive for GNR, and he is restarted on Ertapenem.  He denies dysuria, increased urgency or frequency, hematuria or retention.  Denies pain.     PAST MEDICAL & SURGICAL HISTORY:  History of BPH  Hypertension  Mycosis fungoides  Stage I adenocarcinoma of lung, unspecified laterality: s/p lung resection R lung, s/p CHOP, cisplatin  Ganglion or synovial cyst of knee: excision from right knee  S/P lobectomy of lung    MEDICATIONS  (STANDING):  enoxaparin Injectable 40 milliGRAM(s) SubCutaneous daily  ertapenem  IVPB 1000 milliGRAM(s) IV Intermittent every 24 hours  lisinopril 10 milliGRAM(s) Oral daily  metoprolol tartrate 12.5 milliGRAM(s) Oral two times a day  sodium chloride 0.9%. 1000 milliLiter(s) (60 mL/Hr) IV Continuous <Continuous>  tamsulosin 0.4 milliGRAM(s) Oral at bedtime    MEDICATIONS  (PRN):  acetaminophen   Tablet .. 650 milliGRAM(s) Oral every 8 hours PRN Temp greater or equal to 38C (100.4F), Mild Pain (1 - 3), Moderate Pain (4 - 6)    FAMILY HISTORY:  FH: lung cancer    Allergies  No Known Allergies    REVIEW OF SYSTEMS: Otherwise negative as stated in HPI    Vital Signs Last 24 Hrs  T(C): 36.8 (2020 20:36), Max: 38.9 (2020 04:52)  T(F): 98.2 (2020 20:36), Max: 102 (2020 04:52)  HR: 72 (2020 20:36) (72 - 94)  BP: 110/65 (2020 20:36) (110/65 - 129/70)  BP(mean): --  RR: 18 (2020 20:36) (18 - 18)  SpO2: 99% (2020 20:36) (97% - 99%)    PHYSICAL EXAM:  General: Awake and Alert in no acute distress    Respiratory and Thorax: no resp distress   	  Cardiovascular: Regular    Gastrointestinal: soft, non tender, no CVAT                       LABS:                        11.1   15.73 )-----------( 289      ( 2020 06:14 )             35.3     04-24    133<L>  |  98  |  14  ----------------------------<  125<H>  4.2   |  23  |  0.94    Ca    9.4      2020 06:14    TPro  8.6<H>  /  Alb  4.0  /  TBili  0.3  /  DBili  x   /  AST  28  /  ALT  59<H>  /  AlkPhos  103  04-23    PT/INR - ( 2020 14:50 )   PT: 14.1 SEC;   INR: 1.23          PTT - ( 2020 14:50 )  PTT:31.4 SEC  Urinalysis Basic - ( 2020 16:59 )    Color: YELLOW / Appearance: CLEAR / S.027 / pH: 6.0  Gluc: NEGATIVE / Ketone: NEGATIVE  / Bili: NEGATIVE / Urobili: NORMAL   Blood: MODERATE / Protein: 50 / Nitrite: POSITIVE   Leuk Esterase: MODERATE / RBC: 26-50 / WBC >50   Sq Epi: OCC / Non Sq Epi: x / Bacteria: MANY    URINE CX:  Culture - Urine (20 @ 20:44)    Specimen Source: .Urine Clean Catch (Midstream)    Culture Results:   >100,000 CFU/ml Gram Negative Rods    Culture - Urine (20 @ 10:56)    -  Gentamicin: S <=4    -  Imipenem: S <=1    -  Levofloxacin: R >4    -  Meropenem: S <=1    -  Nitrofurantoin: S <=32 Should not be used to treat pyelonephritis    -  Piperacillin/Tazobactam: R <=16    -  Tigecycline: S <=2    -  Tobramycin: R >8    -  Trimethoprim/Sulfamethoxazole: S <=2/38    -  Amikacin: S <=16    -  Ampicillin: R >16 These ampicillin results predict results for amoxicillin    -  Ampicillin/Sulbactam: R >16/8 Enterobacter, Citrobacter, and Serratia may develop resistance during prolonged therapy (3-4 days)    -  Aztreonam: R >16    -  Cefazolin: R >16 ([email or web address]) For uncomplicated UTI with K. pneumoniae, E. coli, or P. mirablis: KAILASH <=16 is sensitive and KAILASH >=32 is resistant. This also predicts results for oral agents cefaclor, cefdinir, cefpodoxime, cefprozil, cefuroxime axetil, cephalexin and locarbef for uncomplicated UTI. Note that some isolates may be susceptible to these agents while testing resistant to cefazolin.    -  Cefepime: R >16    -  Cefoxitin: S <=8    -  Ceftriaxone: R >32 Enterobacter, Citrobacter, and Serratia may develop resistance during prolonged therapy    -  Ciprofloxacin: R >2    -  Ertapenem: S <=1    Specimen Source: .Urine Clean Catch (Midstream)    Culture Results:   >100,000 CFU/ml Escherichia coli ESBL    Organism Identification: Escherichia coli ESBL    Organism: Escherichia coli ESBL    Method Type: KAILASH    RADIOLOGY:  < from: CT Abdomen and Pelvis w/ Oral Cont and w/ IV Cont (20 @ 14:12) >    IMPRESSION:     Enlarged right hilar lymph node unchanged.  Apparent right middle lobectomy with postsurgical scarring.  Bilateral small indeterminant adrenal nodule is unchanged.    FRANCISCO JAVIER PINA M.D., ATTENDING RADIOLOGIST   This document has been electronically signed. 2020  4:12PM        < end of copied text > HPI: This is a 64 year old male with a history of BPH, HTN, mycosis fungoides s/p 7 cycles of CHOP in 2015, stage 1 lung adenocarcinoma s/p 1 cycle of Cisplatin/Taxol in [facility], and most recently diagnosed with metastatic NSCLC 2020, and a year of low grade fevers, recent admission for ESBL Ecoli UTI, no readmitted with persistent fevers.  He has recently moved to the  from [facility] for medical treatment.  He had been having low grade 100-100.4 fevers for a year, and no located source.  He was referred to oncology and an infectious work-up was started, where he was found to have an ESBL Ecoli UTI 20, and was prescribed Macrobid.  He required hospital admission 3/6/20 for Urosepsis, urine culture was again positive for ESBL Ecoli, and was treated with Ertapenem followed by Bactrim at home for a total of 7 days.  He was diagnosed with COVID-19 on .  He presents again with high fevers at home, and urine culture is positive for GNR, and he is restarted on Ertapenem.  He denies dysuria, increased urgency or frequency, hematuria or retention.  Denies pain.     PAST MEDICAL & SURGICAL HISTORY:  History of BPH  Hypertension  Mycosis fungoides  Stage I adenocarcinoma of lung, unspecified laterality: s/p lung resection R lung, s/p CHOP, cisplatin  Ganglion or synovial cyst of knee: excision from right knee  S/P lobectomy of lung    MEDICATIONS  (STANDING):  enoxaparin Injectable 40 milliGRAM(s) SubCutaneous daily  ertapenem  IVPB 1000 milliGRAM(s) IV Intermittent every 24 hours  lisinopril 10 milliGRAM(s) Oral daily  metoprolol tartrate 12.5 milliGRAM(s) Oral two times a day  sodium chloride 0.9%. 1000 milliLiter(s) (60 mL/Hr) IV Continuous <Continuous>  tamsulosin 0.4 milliGRAM(s) Oral at bedtime    MEDICATIONS  (PRN):  acetaminophen   Tablet .. 650 milliGRAM(s) Oral every 8 hours PRN Temp greater or equal to 38C (100.4F), Mild Pain (1 - 3), Moderate Pain (4 - 6)    FAMILY HISTORY:  FH: lung cancer    Allergies  No Known Allergies    REVIEW OF SYSTEMS: Otherwise negative as stated in HPI    Vital Signs Last 24 Hrs  T(C): 36.8 (2020 20:36), Max: 38.9 (2020 04:52)  T(F): 98.2 (2020 20:36), Max: 102 (2020 04:52)  HR: 72 (2020 20:36) (72 - 94)  BP: 110/65 (2020 20:36) (110/65 - 129/70)  BP(mean): --  RR: 18 (2020 20:36) (18 - 18)  SpO2: 99% (2020 20:36) (97% - 99%)    PHYSICAL EXAM:  General: Awake and Alert in no acute distress    Respiratory and Thorax: no resp distress   	  Cardiovascular: Regular    Gastrointestinal: soft, non tender, no CVAT    Genitourinary: glans uncircumcised, no lesions, no pain, no drainage.  No scrotal or testicular tenderness.                     LABS:                        11.1   15.73 )-----------( 289      ( 2020 06:14 )             35.3     04-24    133<L>  |  98  |  14  ----------------------------<  125<H>  4.2   |  23  |  0.94    Ca    9.4      2020 06:14    TPro  8.6<H>  /  Alb  4.0  /  TBili  0.3  /  DBili  x   /  AST  28  /  ALT  59<H>  /  AlkPhos  103  04-23    PT/INR - ( 2020 14:50 )   PT: 14.1 SEC;   INR: 1.23          PTT - ( 2020 14:50 )  PTT:31.4 SEC  Urinalysis Basic - ( 2020 16:59 )    Color: YELLOW / Appearance: CLEAR / S.027 / pH: 6.0  Gluc: NEGATIVE / Ketone: NEGATIVE  / Bili: NEGATIVE / Urobili: NORMAL   Blood: MODERATE / Protein: 50 / Nitrite: POSITIVE   Leuk Esterase: MODERATE / RBC: 26-50 / WBC >50   Sq Epi: OCC / Non Sq Epi: x / Bacteria: MANY    URINE CX:  Culture - Urine (20 @ 20:44)    Specimen Source: .Urine Clean Catch (Midstream)    Culture Results:   >100,000 CFU/ml Gram Negative Rods    Culture - Urine (20 @ 10:56)    -  Gentamicin: S <=4    -  Imipenem: S <=1    -  Levofloxacin: R >4    -  Meropenem: S <=1    -  Nitrofurantoin: S <=32 Should not be used to treat pyelonephritis    -  Piperacillin/Tazobactam: R <=16    -  Tigecycline: S <=2    -  Tobramycin: R >8    -  Trimethoprim/Sulfamethoxazole: S <=2/38    -  Amikacin: S <=16    -  Ampicillin: R >16 These ampicillin results predict results for amoxicillin    -  Ampicillin/Sulbactam: R >16/8 Enterobacter, Citrobacter, and Serratia may develop resistance during prolonged therapy (3-4 days)    -  Aztreonam: R >16    -  Cefazolin: R >16 ([email or web address]) For uncomplicated UTI with K. pneumoniae, E. coli, or P. mirablis: KAILASH <=16 is sensitive and KAILASH >=32 is resistant. This also predicts results for oral agents cefaclor, cefdinir, cefpodoxime, cefprozil, cefuroxime axetil, cephalexin and locarbef for uncomplicated UTI. Note that some isolates may be susceptible to these agents while testing resistant to cefazolin.    -  Cefepime: R >16    -  Cefoxitin: S <=8    -  Ceftriaxone: R >32 Enterobacter, Citrobacter, and Serratia may develop resistance during prolonged therapy    -  Ciprofloxacin: R >2    -  Ertapenem: S <=1    Specimen Source: .Urine Clean Catch (Midstream)    Culture Results:   >100,000 CFU/ml Escherichia coli ESBL    Organism Identification: Escherichia coli ESBL    Organism: Escherichia coli ESBL    Method Type: KAILASH    RADIOLOGY:  < from: CT Abdomen and Pelvis w/ Oral Cont and w/ IV Cont (20 @ 14:12) >    IMPRESSION:     Enlarged right hilar lymph node unchanged.  Apparent right middle lobectomy with postsurgical scarring.  Bilateral small indeterminant adrenal nodule is unchanged.    FRANCISCO JAVIER PINA M.D., ATTENDING RADIOLOGIST   This document has been electronically signed. 2020  4:12PM        < end of copied text >

## 2020-04-24 NOTE — CHART NOTE - NSCHARTNOTEFT_GEN_A_CORE
+fever up to 102 F this am.  s/p Tylenol repeat tem 98F. Patient remains on room air with stable Sat =97%.  Will Follow up BCx/UCx.  Resumed home HTN, HLD, DMT2 Lisinopril for HTN.

## 2020-04-24 NOTE — PROGRESS NOTE ADULT - PROBLEM SELECTOR PLAN 1
Patient recently diagnosed with COVID-19.  His admission chest-ray is consistent with probable viral pneumonia due to SARS-CoV-2.  Currently his SpO2 on room air is 98% and he is minimally symptomatic.  - will continue to monitor SpO2 on room air; supplemental oxygen as necessary to maintain SpO2 > 92%.  - no indication for adjunctive therapy for COVID-19 at this time.  - CT angiogram yesterday (4/23) was nondiagnostic for pulmonary embolism due to poor opacification of the pulmonary arteries.  Currently there is a low clinical suspicion for pulmonary embolus (no tachycardia, good room air SpO2, no symptoms), therefore will continue to observe conservatively.  If any changes in his symptoms or SpO2 will consider repeating the CT angiogram.

## 2020-04-24 NOTE — PROGRESS NOTE ADULT - PROBLEM SELECTOR PLAN 2
Urinalysis on admission consistent with likely urinary tract infection (UTI)  - has been started empirically on ertapenem (he was treated with this at the time of his last UTI in March 2020).  - follow results of urine culture, blood culture  - consider Urology consultation for evaluation of recurrent UTI in a male

## 2020-04-24 NOTE — CONSULT NOTE ADULT - ASSESSMENT
64 year old male with a history of BPH, HTN, mycosis fungoides s/p 7 cycles of CHOP in [place] 2015, stage 1 lung adenocarcinoma s/p 1 cycle of Cisplatin/Taxol in [place], and most recently diagnosed with metastatic NSCLC 2/2020, and a year of low grade fevers, recent admission for ESBL Ecoli UTI, now readmitted with persistent fevers and urine culture positive for GNR, also recent COVID-19 infection.

## 2020-04-24 NOTE — CHART NOTE - NSCHARTNOTEFT_GEN_A_CORE
Patient approached by study team regarding eligibility for inclusion in the Famotidine Clinical Trial for treatment of COVID-19. Physician explained all requirements and components of the clinical trial including potential risks and benefits. All questions and concerns were answered.     Patient consented with use of  (#[id number]). Patient provided informed consent. Patient was given a signed copy of the consent form for their records.    Patient randomized to clinical trial.     Category: moderate  Oxygen requirement: RA Patient approached by study team regarding eligibility for inclusion in the Famotidine Clinical Trial for treatment of COVID-19. Physician explained all requirements and components of the clinical trial including potential risks and benefits. All questions and concerns were answered.     Patient consented with use of  (#[id number]). Patient provided informed consent. Patient was given a signed copy of the consent form for their records.    Patient randomized to clinical trial.     Category: moderate  Oxygen requirement: RA    *******************************************  ADDENDUM  *******************************************    After conversation with family, pt now wishes to be withdrawn from the study. Pt will be removed from the study per his wishes. Medications have not been given, and will be returned to the pharmacy.    Yayo

## 2020-04-25 LAB
-  AMIKACIN: SIGNIFICANT CHANGE UP
-  AMPICILLIN/SULBACTAM: SIGNIFICANT CHANGE UP
-  AMPICILLIN: SIGNIFICANT CHANGE UP
-  AZTREONAM: SIGNIFICANT CHANGE UP
-  CEFAZOLIN: SIGNIFICANT CHANGE UP
-  CEFEPIME: SIGNIFICANT CHANGE UP
-  CEFOXITIN: SIGNIFICANT CHANGE UP
-  CEFTRIAXONE: SIGNIFICANT CHANGE UP
-  CIPROFLOXACIN: SIGNIFICANT CHANGE UP
-  ERTAPENEM: SIGNIFICANT CHANGE UP
-  GENTAMICIN: SIGNIFICANT CHANGE UP
-  IMIPENEM: SIGNIFICANT CHANGE UP
-  LEVOFLOXACIN: SIGNIFICANT CHANGE UP
-  MEROPENEM: SIGNIFICANT CHANGE UP
-  NITROFURANTOIN: SIGNIFICANT CHANGE UP
-  PIPERACILLIN/TAZOBACTAM: SIGNIFICANT CHANGE UP
-  TIGECYCLINE: SIGNIFICANT CHANGE UP
-  TOBRAMYCIN: SIGNIFICANT CHANGE UP
-  TRIMETHOPRIM/SULFAMETHOXAZOLE: SIGNIFICANT CHANGE UP
ALBUMIN SERPL ELPH-MCNC: 3.4 G/DL — SIGNIFICANT CHANGE UP (ref 3.3–5)
ALP SERPL-CCNC: 84 U/L — SIGNIFICANT CHANGE UP (ref 40–120)
ALT FLD-CCNC: 34 U/L — SIGNIFICANT CHANGE UP (ref 4–41)
ANION GAP SERPL CALC-SCNC: 14 MMO/L — SIGNIFICANT CHANGE UP (ref 7–14)
AST SERPL-CCNC: 9 U/L — SIGNIFICANT CHANGE UP (ref 4–40)
BASOPHILS # BLD AUTO: 0.02 K/UL — SIGNIFICANT CHANGE UP (ref 0–0.2)
BASOPHILS NFR BLD AUTO: 0.2 % — SIGNIFICANT CHANGE UP (ref 0–2)
BILIRUB SERPL-MCNC: 0.2 MG/DL — SIGNIFICANT CHANGE UP (ref 0.2–1.2)
BUN SERPL-MCNC: 16 MG/DL — SIGNIFICANT CHANGE UP (ref 7–23)
CALCIUM SERPL-MCNC: 9.6 MG/DL — SIGNIFICANT CHANGE UP (ref 8.4–10.5)
CHLORIDE SERPL-SCNC: 97 MMOL/L — LOW (ref 98–107)
CO2 SERPL-SCNC: 23 MMOL/L — SIGNIFICANT CHANGE UP (ref 22–31)
CREAT SERPL-MCNC: 0.97 MG/DL — SIGNIFICANT CHANGE UP (ref 0.5–1.3)
CULTURE RESULTS: SIGNIFICANT CHANGE UP
EOSINOPHIL # BLD AUTO: 0.17 K/UL — SIGNIFICANT CHANGE UP (ref 0–0.5)
EOSINOPHIL NFR BLD AUTO: 1.7 % — SIGNIFICANT CHANGE UP (ref 0–6)
GLUCOSE SERPL-MCNC: 131 MG/DL — HIGH (ref 70–99)
HCT VFR BLD CALC: 35.1 % — LOW (ref 39–50)
HGB BLD-MCNC: 10.8 G/DL — LOW (ref 13–17)
IMM GRANULOCYTES NFR BLD AUTO: 0.8 % — SIGNIFICANT CHANGE UP (ref 0–1.5)
LYMPHOCYTES # BLD AUTO: 1.39 K/UL — SIGNIFICANT CHANGE UP (ref 1–3.3)
LYMPHOCYTES # BLD AUTO: 13.8 % — SIGNIFICANT CHANGE UP (ref 13–44)
MAGNESIUM SERPL-MCNC: 2 MG/DL — SIGNIFICANT CHANGE UP (ref 1.6–2.6)
MCHC RBC-ENTMCNC: 25.2 PG — LOW (ref 27–34)
MCHC RBC-ENTMCNC: 30.8 % — LOW (ref 32–36)
MCV RBC AUTO: 81.8 FL — SIGNIFICANT CHANGE UP (ref 80–100)
METHOD TYPE: SIGNIFICANT CHANGE UP
MONOCYTES # BLD AUTO: 1.15 K/UL — HIGH (ref 0–0.9)
MONOCYTES NFR BLD AUTO: 11.4 % — SIGNIFICANT CHANGE UP (ref 2–14)
NEUTROPHILS # BLD AUTO: 7.26 K/UL — SIGNIFICANT CHANGE UP (ref 1.8–7.4)
NEUTROPHILS NFR BLD AUTO: 72.1 % — SIGNIFICANT CHANGE UP (ref 43–77)
NRBC # FLD: 0 K/UL — SIGNIFICANT CHANGE UP (ref 0–0)
ORGANISM # SPEC MICROSCOPIC CNT: SIGNIFICANT CHANGE UP
ORGANISM # SPEC MICROSCOPIC CNT: SIGNIFICANT CHANGE UP
PLATELET # BLD AUTO: 287 K/UL — SIGNIFICANT CHANGE UP (ref 150–400)
PMV BLD: 9.2 FL — SIGNIFICANT CHANGE UP (ref 7–13)
POTASSIUM SERPL-MCNC: 4.6 MMOL/L — SIGNIFICANT CHANGE UP (ref 3.5–5.3)
POTASSIUM SERPL-SCNC: 4.6 MMOL/L — SIGNIFICANT CHANGE UP (ref 3.5–5.3)
PROT SERPL-MCNC: 7.8 G/DL — SIGNIFICANT CHANGE UP (ref 6–8.3)
RBC # BLD: 4.29 M/UL — SIGNIFICANT CHANGE UP (ref 4.2–5.8)
RBC # FLD: 17.2 % — HIGH (ref 10.3–14.5)
SODIUM SERPL-SCNC: 134 MMOL/L — LOW (ref 135–145)
SPECIMEN SOURCE: SIGNIFICANT CHANGE UP
WBC # BLD: 10.07 K/UL — SIGNIFICANT CHANGE UP (ref 3.8–10.5)
WBC # FLD AUTO: 10.07 K/UL — SIGNIFICANT CHANGE UP (ref 3.8–10.5)

## 2020-04-25 PROCEDURE — 99233 SBSQ HOSP IP/OBS HIGH 50: CPT

## 2020-04-25 PROCEDURE — 99222 1ST HOSP IP/OBS MODERATE 55: CPT

## 2020-04-25 RX ADMIN — LISINOPRIL 10 MILLIGRAM(S): 2.5 TABLET ORAL at 05:00

## 2020-04-25 RX ADMIN — Medication 12.5 MILLIGRAM(S): at 17:10

## 2020-04-25 RX ADMIN — ERTAPENEM SODIUM 120 MILLIGRAM(S): 1 INJECTION, POWDER, LYOPHILIZED, FOR SOLUTION INTRAMUSCULAR; INTRAVENOUS at 17:10

## 2020-04-25 RX ADMIN — TAMSULOSIN HYDROCHLORIDE 0.4 MILLIGRAM(S): 0.4 CAPSULE ORAL at 20:55

## 2020-04-25 RX ADMIN — ENOXAPARIN SODIUM 40 MILLIGRAM(S): 100 INJECTION SUBCUTANEOUS at 12:21

## 2020-04-25 RX ADMIN — Medication 12.5 MILLIGRAM(S): at 05:00

## 2020-04-25 NOTE — CONSULT NOTE ADULT - SUBJECTIVE AND OBJECTIVE BOX
63 y/o male w/ PMHx Non-Hodgkins Lymphoma and Adenocarcinoma of Lung w/ Lung Resection in [facility] in 2015.  Recent Endobronchial Ultrasound with Biopsy with Dr. Ramos at [facility] on 3/18/20 with evidence of non-small cell carcinoma.  Tested COVID+ on 4/7, and admitted with fevers on 4/23.      Vital Signs:  Vital Signs Last 24 Hrs  T(C): 37 (04-25-20 @ 12:20), Max: 37 (04-25-20 @ 12:20)  T(F): 98.6 (04-25-20 @ 12:20), Max: 98.6 (04-25-20 @ 12:20)  HR: 81 (04-25-20 @ 12:20) (72 - 82)  BP: 114/64 (04-25-20 @ 12:20) (110/65 - 114/64)  RR: 17 (04-25-20 @ 12:20) (17 - 18)  SpO2: 95% (04-25-20 @ 12:20) (95% - 99%) on (O2)  64y    Telemetry/Alarms:  General: WN/WD NAD  Neurology: Awake, nonfocal, REYNOSO x 4  Eyes: Scleras clear, PERRLA/ EOMI, Gross vision intact  ENT: Gross hearing intact, grossly patent pharynx, no stridor  Neck: Neck supple, trachea midline, No JVD  Respiratory: CTA B/L, No wheezing, rales, rhonchi  CV: RRR, S1S2, no murmurs, rubs or gallops  Abdominal: Soft, NT, ND  Extremities: No edema, + peripheral pulses  Skin: No Rashes, Hematoma, Ecchymosis  Lymphatic: No Neck, axilla, groin LAD  Psych: Oriented x 3, normal affect  Incisions:   Tubes:    Relevant labs, radiology and Medications reviewed                        10.8   10.07 )-----------( 287      ( 25 Apr 2020 05:30 )             35.1     04-25    134<L>  |  97<L>  |  16  ----------------------------<  131<H>  4.6   |  23  |  0.97    Ca    9.6      25 Apr 2020 05:30  Mg     2.0     04-25    TPro  7.8  /  Alb  3.4  /  TBili  0.2  /  DBili  x   /  AST  9   /  ALT  34  /  AlkPhos  84  04-25      MEDICATIONS  (STANDING):  enoxaparin Injectable 40 milliGRAM(s) SubCutaneous daily  ertapenem  IVPB 1000 milliGRAM(s) IV Intermittent every 24 hours  lisinopril 10 milliGRAM(s) Oral daily  metoprolol tartrate 12.5 milliGRAM(s) Oral two times a day  sodium chloride 0.9%. 1000 milliLiter(s) (60 mL/Hr) IV Continuous <Continuous>  tamsulosin 0.4 milliGRAM(s) Oral at bedtime    MEDICATIONS  (PRN):  acetaminophen   Tablet .. 650 milliGRAM(s) Oral every 8 hours PRN Temp greater or equal to 38C (100.4F), Mild Pain (1 - 3), Moderate Pain (4 - 6)        Assessment  64y Male  w/ PAST MEDICAL & SURGICAL HISTORY:  History of BPH  Hypertension  Mycosis fungoides  Stage I adenocarcinoma of lung, unspecified laterality: s/p lung resection R lung, s/p CHOP, cisplatin  Ganglion or synovial cyst of knee: excision from right knee  S/P lobectomy of lung  admitted with complaints of Patient is a 64y old  Male who presents with a chief complaint of Fevers x 3 weeks (25 Apr 2020 11:19)  .  On (Date), patient underwent . Postoperative course/issues:    PLAN 65 y/o male w/ PMHx Non-Hodgkins Lymphoma and Adenocarcinoma of Lung w/ Lung Resection in [facility] in 2015.  Recent Endobronchial Ultrasound with Biopsy with Dr. Ramos at [facility] on 3/18/20 with evidence of non-small cell carcinoma.  Tested COVID+ on 4/7, and admitted with fevers on 4/23.  CT on 4/23 revealed groundglass opacities on the RU    Vital Signs:  Vital Signs Last 24 Hrs  T(C): 37 (04-25-20 @ 12:20), Max: 37 (04-25-20 @ 12:20)  T(F): 98.6 (04-25-20 @ 12:20), Max: 98.6 (04-25-20 @ 12:20)  HR: 81 (04-25-20 @ 12:20) (72 - 82)  BP: 114/64 (04-25-20 @ 12:20) (110/65 - 114/64)  RR: 17 (04-25-20 @ 12:20) (17 - 18)  SpO2: 95% (04-25-20 @ 12:20) (95% - 99%) on (O2)  64y    Telemetry/Alarms:  General: WN/WD NAD  Neurology: Awake, nonfocal, REYNOSO x 4  Eyes: Scleras clear, PERRLA/ EOMI, Gross vision intact  ENT: Gross hearing intact, grossly patent pharynx, no stridor  Neck: Neck supple, trachea midline, No JVD  Respiratory: CTA B/L, No wheezing, rales, rhonchi  CV: RRR, S1S2, no murmurs, rubs or gallops  Abdominal: Soft, NT, ND  Extremities: No edema, + peripheral pulses  Skin: No Rashes, Hematoma, Ecchymosis  Lymphatic: No Neck, axilla, groin LAD  Psych: Oriented x 3, normal affect  Incisions:   Tubes:    Relevant labs, radiology and Medications reviewed                        10.8   10.07 )-----------( 287      ( 25 Apr 2020 05:30 )             35.1     04-25    134<L>  |  97<L>  |  16  ----------------------------<  131<H>  4.6   |  23  |  0.97    Ca    9.6      25 Apr 2020 05:30  Mg     2.0     04-25    TPro  7.8  /  Alb  3.4  /  TBili  0.2  /  DBili  x   /  AST  9   /  ALT  34  /  AlkPhos  84  04-25      MEDICATIONS  (STANDING):  enoxaparin Injectable 40 milliGRAM(s) SubCutaneous daily  ertapenem  IVPB 1000 milliGRAM(s) IV Intermittent every 24 hours  lisinopril 10 milliGRAM(s) Oral daily  metoprolol tartrate 12.5 milliGRAM(s) Oral two times a day  sodium chloride 0.9%. 1000 milliLiter(s) (60 mL/Hr) IV Continuous <Continuous>  tamsulosin 0.4 milliGRAM(s) Oral at bedtime    MEDICATIONS  (PRN):  acetaminophen   Tablet .. 650 milliGRAM(s) Oral every 8 hours PRN Temp greater or equal to 38C (100.4F), Mild Pain (1 - 3), Moderate Pain (4 - 6)        Assessment  64y Male  w/ PAST MEDICAL & SURGICAL HISTORY:  History of BPH  Hypertension  Mycosis fungoides  Stage I adenocarcinoma of lung, unspecified laterality: s/p lung resection R lung, s/p CHOP, cisplatin  Ganglion or synovial cyst of knee: excision from right knee  S/P lobectomy of lung  admitted with complaints of Patient is a 64y old  Male who presents with a chief complaint of Fevers x 3 weeks (25 Apr 2020 11:19)  .  On (Date), patient underwent . Postoperative course/issues:    PLAN 65 y/o male w/ PMHx Non-Hodgkins Lymphoma and Adenocarcinoma of Lung w/ Lung Resection in [facility] in 2015.  Recent Endobronchial Ultrasound with Biopsy with Dr. Ramos at [facility] on 3/18/20 with evidence of non-small cell carcinoma.  Tested COVID+ on 4/7, and admitted with fevers on 4/23.  CT on 4/23 revealed groundglass opacities on the RUL and RLL new from 2/14/20, and worsening hilar adenopathy and increasing pleureal-based right density.    Vital Signs:  Vital Signs Last 24 Hrs  T(C): 37 (04-25-20 @ 12:20), Max: 37 (04-25-20 @ 12:20)  T(F): 98.6 (04-25-20 @ 12:20), Max: 98.6 (04-25-20 @ 12:20)  HR: 81 (04-25-20 @ 12:20) (72 - 82)  BP: 114/64 (04-25-20 @ 12:20) (110/65 - 114/64)  RR: 17 (04-25-20 @ 12:20) (17 - 18)  SpO2: 95% (04-25-20 @ 12:20) (95% - 99%) on room-air      General: Awake, Alert, NAD, on room-air  Neck: Neck supple, trachea midline, No JVD  Respiratory: coarse breath sounds right side  CV: RRR, S1S2, no murmurs, rubs or gallops  Abdominal: Soft, NT, ND  Extremities: No edema, + peripheral pulses  Skin: No Rashes, Hematoma, Ecchymosis                          10.8   10.07 )-----------( 287                35.1       134<L>  |  97<L>  |  16  ----------------------------<  131<H>  4.6   |  23  |  0.97    Ca    9.6        Mg     2.0     TPro  7.8  /  Alb  3.4  /  TBili  0.2  /  DBili  x   /  AST  9   /  ALT  34  /  AlkPhos  84       Culture - Blood (collected 04-23-20 @ 18:18)  Source: .Blood Blood-Peripheral  Preliminary Report (04-24-20 @ 19:02):    No growth to date.    Culture - Blood (collected 04-23-20 @ 18:15)  Source: .Blood Blood-Peripheral  Preliminary Report (04-24-20 @ 19:02):    No growth to date.    Culture - Urine (collected 04-23-20 @ 14:17)  Source: .Urine Clean Catch (Midstream)  Final Report (04-25-20 @ 15:55):    >100,000 CFU/ml Escherichia coli ESBL  Organism: Escherichia coli ESBL (04-25-20 @ 15:55)  Organism: Escherichia coli ESBL (04-25-20 @ 15:55)      -  Amikacin: S <=16      -  Ampicillin: R >16 These ampicillin results predict results for amoxicillin      -  Ampicillin/Sulbactam: R >16/8 Enterobacter, Citrobacter, and Serratia may develop resistance during prolonged therapy (3-4 days)      -  Aztreonam: R >16      -  Cefazolin: R >16 (MIC_CL_[id number]) For uncomplicated UTI with K. pneumoniae, E. coli, or P. mirablis: KAILASH <=16 is sensitive and KAILASH >=32 is resistant. This also predicts results for oral agents cefaclor, cefdinir, cefpodoxime, cefprozil, cefuroxime axetil, cephalexin and locarbef for uncomplicated UTI. Note that some isolates may be susceptible to these agents while testing resistant to cefazolin.      -  Cefepime: R >16      -  Cefoxitin: S <=8      -  Ceftriaxone: R >32 Enterobacter, Citrobacter, and Serratia may develop resistance during prolonged therapy      -  Ciprofloxacin: R >2      -  Ertapenem: S <=1      -  Gentamicin: S <=4      -  Imipenem: S <=1      -  Levofloxacin: R >4      -  Meropenem: S <=1      -  Nitrofurantoin: S <=32 Should not be used to treat pyelonephritis      -  Piperacillin/Tazobactam: R <=16      -  Tigecycline: S <=2      -  Tobramycin: R >8      -  Trimethoprim/Sulfamethoxazole: S <=2/38      Method Type: KAILASH      MEDICATIONS  (STANDING):  enoxaparin Injectable 40 milliGRAM(s) SubCutaneous daily  ertapenem  IVPB 1000 milliGRAM(s) IV Intermittent every 24 hours  lisinopril 10 milliGRAM(s) Oral daily  metoprolol tartrate 12.5 milliGRAM(s) Oral two times a day  sodium chloride 0.9%. 1000 milliLiter(s) (60 mL/Hr) IV Continuous <Continuous>  tamsulosin 0.4 milliGRAM(s) Oral at bedtime    MEDICATIONS  (PRN):  acetaminophen   Tablet .. 650 milliGRAM(s) Oral every 8 hours PRN Temp greater or equal to 38C (100.4F), Mild Pain (1 - 3), Moderate Pain (4 - 6)      Assessment  65 y/o male w/ PAST MEDICAL & SURGICAL HISTORY:  History of BPH  Hypertension  Mycosis fungoides  Stage I adenocarcinoma of lung, unspecified laterality: s/p lung resection R lung, s/p CHOP, cisplatin  Ganglion or synovial cyst of knee: excision from right knee  S/P lobectomy of lung    Adenocarcinoma of Lung w/ Lung Resection in [facility] in 2015.  Recent Endobronchial Ultrasound with Biopsy with Dr. Ramos at [facility] on 3/18/20 with evidence of non-small cell carcinoma.  Tested COVID+ on 4/7, and admitted with fevers on 4/23.      PLAN  Patient and CT results discussed with Dr. Ramos  No surgical/procedural intervention from Thoracic Surgery.

## 2020-04-25 NOTE — PROGRESS NOTE ADULT - SUBJECTIVE AND OBJECTIVE BOX
PROGRESS NOTE:     Patient is a 64y old  Male who presents with a chief complaint of Fevers x 3 weeks (2020 21:45)      SUBJECTIVE / OVERNIGHT EVENTS:  Patient interviewed with his daughter acting as .  He reports that he is feeling well.  Denies dyspnea or chest pain.  No dysuria, urinary frequency, or gross hematuria.    ADDITIONAL REVIEW OF SYSTEMS:    MEDICATIONS  (STANDING):  enoxaparin Injectable 40 milliGRAM(s) SubCutaneous daily  ertapenem  IVPB 1000 milliGRAM(s) IV Intermittent every 24 hours  lisinopril 10 milliGRAM(s) Oral daily  metoprolol tartrate 12.5 milliGRAM(s) Oral two times a day  sodium chloride 0.9%. 1000 milliLiter(s) (60 mL/Hr) IV Continuous <Continuous>  tamsulosin 0.4 milliGRAM(s) Oral at bedtime    MEDICATIONS  (PRN):  acetaminophen   Tablet .. 650 milliGRAM(s) Oral every 8 hours PRN Temp greater or equal to 38C (100.4F), Mild Pain (1 - 3), Moderate Pain (4 - 6)      CAPILLARY BLOOD GLUCOSE        I&O's Summary      PHYSICAL EXAM:  Vital Signs Last 24 Hrs  T(C): 36.8 (2020 20:36), Max: 38.7 (2020 16:24)  T(F): 98.2 (2020 20:36), Max: 101.7 (2020 16:24)  HR: 82 (2020 04:56) (72 - 82)  BP: 113/82 (2020 04:56) (110/65 - 126/73)  BP(mean): --  RR: 18 (2020 04:56) (18 - 18)  SpO2: 98% (2020 04:56) (98% - 99%)    CONSTITUTIONAL: NAD, well-developed  RESPIRATORY: Normal respiratory effort;   CARDIOVASCULAR: Regular rate and rhythm; no lower extremity edema;    MUSCLOSKELETAL: no clubbing or cyanosis of digits;    PSYCH: A+O to person, place, and time; affect appropriate    LABS:                        10.8   10.07 )-----------( 287      ( 2020 05:30 )             35.1     04-25    134<L>  |  97<L>  |  16  ----------------------------<  131<H>  4.6   |  23  |  0.97    Ca    9.6      2020 05:30  Mg     2.0     04-25    TPro  7.8  /  Alb  3.4  /  TBili  0.2  /  DBili  x   /  AST  9   /  ALT  34  /  AlkPhos  84  04-25    PT/INR - ( 2020 14:50 )   PT: 14.1 SEC;   INR: 1.23          PTT - ( 2020 14:50 )  PTT:31.4 SEC      Urinalysis Basic - ( 2020 16:59 )    Color: YELLOW / Appearance: CLEAR / S.027 / pH: 6.0  Gluc: NEGATIVE / Ketone: NEGATIVE  / Bili: NEGATIVE / Urobili: NORMAL   Blood: MODERATE / Protein: 50 / Nitrite: POSITIVE   Leuk Esterase: MODERATE / RBC: 26-50 / WBC >50   Sq Epi: OCC / Non Sq Epi: x / Bacteria: MANY        Culture - Urine (collected 2020 20:44)  Source: .Urine Clean Catch (Midstream)  Preliminary Report (2020 16:14):    >100,000 CFU/ml Gram Negative Rods    Culture - Blood (collected 2020 18:18)  Source: .Blood Blood-Peripheral  Preliminary Report (2020 19:02):    No growth to date.    Culture - Blood (collected 2020 18:15)  Source: .Blood Blood-Peripheral  Preliminary Report (2020 19:02):    No growth to date.        RADIOLOGY & ADDITIONAL TESTS:  Results Reviewed:   Imaging Personally Reviewed:  Electrocardiogram Personally Reviewed:    COORDINATION OF CARE:  Care Discussed with Consultants/Other Providers [Y/N]:  Prior or Outpatient Records Reviewed [Y/N]:

## 2020-04-25 NOTE — PROGRESS NOTE ADULT - ASSESSMENT
In summary, Mr. Enrique is a 64 year old [place] gentleman with an extensive medical history.  He has a history of hypertension, benign prostatic hypertrophy (BPH), and tobacco abuse.  In addition, he has a history of mycosis fungoides (diagnosed in 2015), for which he underwent 7 cycles of CHOP (in the [facility]).  He was also diagnosed with stage I adenocarcinoma of the right lung in May 2019 and underwent a surgical resection (details not currently available) in the [facility].  The surgical lobectomy was followed by adjuvant therapy with one cycle of cisplatin/Taxol (also in [facility]).  Mr. Enrique was evaluated in Thoracic Surgery in February 2020 for recurrent lung cancer and underwent flexible bronchoscopy and an endobronchial ultrasound (EBUS) guided hilar lymph node biopsy in March 2020.  In summary, the biopsy demonstrated evidence of non-small cell carcinoma.  However, the biopsy quantity was deemed insufficient for PDL-testing or foundation one testing.  The plan has been for Mr. Enrique to undergo liquid biopsy.    In the meantime, Mr. Enrique has had a 2-3 month history of intermittent fevers.  The cause of the fevers remained uncertain in [facility], and Mr. Enrique and his wife recently travelled to the [place] for further evaluation (their daughter Kamini is a physician's assistant in [place]).  After arriving in the [place], Mr. Enrique continued to experience intermittent fever in addition to chills and a chronic cough.  He has maintained a good appetite and denies having any weight loss.  Of note is that Mr. Enrique was admitted to [facility] in March 2020 with a urinary tract infection.  Urine culture at that time grew E. coli ESBL (extended spectrum beta lactamase) and he was treated with ertapenem.  Of note is that Mr. Enrique tested positive for COVID-19 following a telemedicine Oncology evaluation, which was performed on April 7th, 2020.    Since being admitted to [facility], Mr. Enrique has had a chest x-ray demonstrating reticular interstitial lung markings bilaterally (most pronounced in the lower lobes) which may represent viral pneumonia.  A CT angiogram (performed 4/23/20) was nondiagnostic for pulmonary embolism due to poor opacification of the pulmonary arteries.  Other findings included peripheral reticular and ground glass opacities within the right upper lobe as well as the right lower lobe which are new from 2/14/2020.  These may be secondary to radiation changes, however underlying infection including COVID-19 is not excluded.  There was also worsening right hilar lymphadenopathy and increasing pleural-based right density in the right midlung field.  Recurrent disease is not excluded at these locations.  In addition, there were lytic lesions within the anterior lateral aspects of the right fourth and fifth ribs concerning for metastatic disease.    A urinalysis performed upon admission demonstrated moderate blood, positive nitrite, moderate leukocyte esterase, > 50 WBC/HPF, and many bacteria.  Mr. Enrique was empirically started on ertapenem pending the results of urine and blood cultures.

## 2020-04-25 NOTE — PROGRESS NOTE ADULT - PROBLEM SELECTOR PLAN 2
Urine culture growing > 100K CFU/mL of gram negative rods; likely to be E. coli however speciation and antibiotic sensitivities pending.  - appreciate input from Urology  - will obtain CT urogram to evaluate for underlying infection/abscess  - check post-void residuals after each void to evaluate bladder emptying  - consider ID consult for guidance on antibiotic therapy.

## 2020-04-26 LAB
ALBUMIN SERPL ELPH-MCNC: 3.7 G/DL — SIGNIFICANT CHANGE UP (ref 3.3–5)
ALP SERPL-CCNC: 85 U/L — SIGNIFICANT CHANGE UP (ref 40–120)
ALT FLD-CCNC: 36 U/L — SIGNIFICANT CHANGE UP (ref 4–41)
ANION GAP SERPL CALC-SCNC: 12 MMO/L — SIGNIFICANT CHANGE UP (ref 7–14)
AST SERPL-CCNC: 11 U/L — SIGNIFICANT CHANGE UP (ref 4–40)
BASOPHILS # BLD AUTO: 0.03 K/UL — SIGNIFICANT CHANGE UP (ref 0–0.2)
BASOPHILS NFR BLD AUTO: 0.4 % — SIGNIFICANT CHANGE UP (ref 0–2)
BILIRUB SERPL-MCNC: < 0.2 MG/DL — LOW (ref 0.2–1.2)
BUN SERPL-MCNC: 19 MG/DL — SIGNIFICANT CHANGE UP (ref 7–23)
CALCIUM SERPL-MCNC: 10 MG/DL — SIGNIFICANT CHANGE UP (ref 8.4–10.5)
CHLORIDE SERPL-SCNC: 100 MMOL/L — SIGNIFICANT CHANGE UP (ref 98–107)
CO2 SERPL-SCNC: 23 MMOL/L — SIGNIFICANT CHANGE UP (ref 22–31)
CREAT SERPL-MCNC: 0.95 MG/DL — SIGNIFICANT CHANGE UP (ref 0.5–1.3)
EOSINOPHIL # BLD AUTO: 0.24 K/UL — SIGNIFICANT CHANGE UP (ref 0–0.5)
EOSINOPHIL NFR BLD AUTO: 3.2 % — SIGNIFICANT CHANGE UP (ref 0–6)
GLUCOSE SERPL-MCNC: 110 MG/DL — HIGH (ref 70–99)
HCT VFR BLD CALC: 36.2 % — LOW (ref 39–50)
HGB BLD-MCNC: 11.3 G/DL — LOW (ref 13–17)
IMM GRANULOCYTES NFR BLD AUTO: 0.9 % — SIGNIFICANT CHANGE UP (ref 0–1.5)
LYMPHOCYTES # BLD AUTO: 1.49 K/UL — SIGNIFICANT CHANGE UP (ref 1–3.3)
LYMPHOCYTES # BLD AUTO: 20 % — SIGNIFICANT CHANGE UP (ref 13–44)
MAGNESIUM SERPL-MCNC: 2.3 MG/DL — SIGNIFICANT CHANGE UP (ref 1.6–2.6)
MCHC RBC-ENTMCNC: 25.5 PG — LOW (ref 27–34)
MCHC RBC-ENTMCNC: 31.2 % — LOW (ref 32–36)
MCV RBC AUTO: 81.7 FL — SIGNIFICANT CHANGE UP (ref 80–100)
MONOCYTES # BLD AUTO: 0.87 K/UL — SIGNIFICANT CHANGE UP (ref 0–0.9)
MONOCYTES NFR BLD AUTO: 11.7 % — SIGNIFICANT CHANGE UP (ref 2–14)
NEUTROPHILS # BLD AUTO: 4.74 K/UL — SIGNIFICANT CHANGE UP (ref 1.8–7.4)
NEUTROPHILS NFR BLD AUTO: 63.8 % — SIGNIFICANT CHANGE UP (ref 43–77)
NRBC # FLD: 0 K/UL — SIGNIFICANT CHANGE UP (ref 0–0)
PLATELET # BLD AUTO: 331 K/UL — SIGNIFICANT CHANGE UP (ref 150–400)
PMV BLD: 9.3 FL — SIGNIFICANT CHANGE UP (ref 7–13)
POTASSIUM SERPL-MCNC: 4.5 MMOL/L — SIGNIFICANT CHANGE UP (ref 3.5–5.3)
POTASSIUM SERPL-SCNC: 4.5 MMOL/L — SIGNIFICANT CHANGE UP (ref 3.5–5.3)
PROT SERPL-MCNC: 8.1 G/DL — SIGNIFICANT CHANGE UP (ref 6–8.3)
RBC # BLD: 4.43 M/UL — SIGNIFICANT CHANGE UP (ref 4.2–5.8)
RBC # FLD: 17.1 % — HIGH (ref 10.3–14.5)
SODIUM SERPL-SCNC: 135 MMOL/L — SIGNIFICANT CHANGE UP (ref 135–145)
WBC # BLD: 7.44 K/UL — SIGNIFICANT CHANGE UP (ref 3.8–10.5)
WBC # FLD AUTO: 7.44 K/UL — SIGNIFICANT CHANGE UP (ref 3.8–10.5)

## 2020-04-26 PROCEDURE — 99233 SBSQ HOSP IP/OBS HIGH 50: CPT

## 2020-04-26 PROCEDURE — 99222 1ST HOSP IP/OBS MODERATE 55: CPT | Mod: GC

## 2020-04-26 RX ORDER — ERTAPENEM SODIUM 1 G/1
1000 INJECTION, POWDER, LYOPHILIZED, FOR SOLUTION INTRAMUSCULAR; INTRAVENOUS EVERY 24 HOURS
Refills: 0 | Status: DISCONTINUED | OUTPATIENT
Start: 2020-04-26 | End: 2020-04-30

## 2020-04-26 RX ADMIN — Medication 12.5 MILLIGRAM(S): at 04:21

## 2020-04-26 RX ADMIN — ENOXAPARIN SODIUM 40 MILLIGRAM(S): 100 INJECTION SUBCUTANEOUS at 12:05

## 2020-04-26 RX ADMIN — Medication 12.5 MILLIGRAM(S): at 17:44

## 2020-04-26 RX ADMIN — LISINOPRIL 10 MILLIGRAM(S): 2.5 TABLET ORAL at 04:22

## 2020-04-26 RX ADMIN — TAMSULOSIN HYDROCHLORIDE 0.4 MILLIGRAM(S): 0.4 CAPSULE ORAL at 22:08

## 2020-04-26 RX ADMIN — ERTAPENEM SODIUM 120 MILLIGRAM(S): 1 INJECTION, POWDER, LYOPHILIZED, FOR SOLUTION INTRAMUSCULAR; INTRAVENOUS at 17:44

## 2020-04-26 NOTE — CONSULT NOTE ADULT - ASSESSMENT
Mr. Enrique is a 64 year old Spanish gentleman with an extensive medical history.  He has a history of hypertension, benign prostatic hypertrophy (BPH), and tobacco abuse.  In addition, he has a history of mycosis fungoides (diagnosed in 2015), for which he underwent 7 cycles of CHOP (in the [facility]).  He was also diagnosed with stage I adenocarcinoma of the right lung in May 2019 and underwent a surgical resection (details not currently available) in the [facility].  The surgical lobectomy was followed by adjuvant therapy with one cycle of cisplatin/Taxol (also in [facility]).   He now presented with continued fevers   He denies any back or flank pain, nor abd pain or suprapubic pain. No dysuria or changes in urine   He presented with fever and tachycardia  Leukocytosis has improved on antibiotics   Blood culture x2 were negative  UA positive with urine culture showing ESBL Ecoli S to Erta and quinolone resistant   He is scheduled for CT urogram and is being followed by urology   CT chest did not reveal pulmonary embolus and showed his malignancy with possible mets to his ribs   DDx of his fevers are potentially multifactorial and may not be related  to UTI as he does not have any urinary symptoms. His fevers might be explained by malignancy vs pulmonary embolus vs COVID19. He is at high risk for DVT/PE given his malignancy and COVID19 seems to make patients at higher risk for clots. He is on RA and breathing comfortably but occasionally tachycardic.   He has been afebrile since 4/24 and he reports feeling much better     Overall, 64M recurrent fevers, ESBL ecoli UTI, COVID19, leukocytosis    Recommendations: Mr. Enrique is a 64 year old [place] gentleman with an extensive medical history.  He has a history of hypertension, benign prostatic hypertrophy (BPH), and tobacco abuse.  In addition, he has a history of mycosis fungoides (diagnosed in 2015), for which he underwent 7 cycles of CHOP (in the [facility]).  He was also diagnosed with stage I adenocarcinoma of the right lung in May 2019 and underwent a surgical resection (details not currently available) in the [facility].  The surgical lobectomy was followed by adjuvant therapy with one cycle of cisplatin/Taxol (also in [facility]).   He now presented with continued fevers   He denies any back or flank pain, nor abd pain or suprapubic pain. No dysuria or changes in urine   He presented with fever and tachycardia  Leukocytosis has improved on antibiotics   Blood culture x2 were negative  UA positive with urine culture showing ESBL Ecoli S to Erta and quinolone resistant   He is scheduled for CT urogram and is being followed by urology   CT chest did not reveal pulmonary embolus and showed his malignancy with possible mets to his ribs   DDx of his fevers are potentially multifactorial and may not be related  to UTI as he does not have any urinary symptoms. His fevers might be explained by malignancy vs pulmonary embolus vs COVID19. He is at high risk for DVT/PE given his malignancy and COVID19 seems to make patients at higher risk for clots. He is on RA and breathing comfortably but occasionally tachycardic.   He has been afebrile since 4/24 and he reports feeling much better     Overall, 64M recurrent fevers, ESBL ecoli UTI, COVID19, leukocytosis    Recommendations:  Continue Invanz (Ertapenem) 1g q24hrs  f/u CT urogram   please perform 64M [place] gentleman with HTN, BPH, ex-smoker, hx Mycosis fungoides (diagnosed in 2015 - s/p CHOP), recent stage I adenoca R lung s/p resection (5/2019) and adjuvant cisplatin/Taxol.  Recent hospitalization for ESBL UTI s/p ertapenem.  Returns with continued fevers.  Again, started on ertapenem with resolution of leukocytosis and fever.  Being followed by urology - ordered CT urogram.  Work up do date: CT chest with possible mets to his ribs.    DDx of his fevers are potentially multifactorial and may not be related  to UTI as he does not have any urinary symptoms. His fevers might be explained by malignancy vs pulmonary embolus vs COVID19. He is at high risk for DVT/PE given his malignancy and COVID19 seems to make patients at higher risk for clots. He is on RA and breathing comfortably but occasionally tachycardic.   He has been afebrile since 4/24 and he reports feeling much better     Overall, 64M recurrent fevers, ESBL ecoli UTI, COVID19, leukocytosis    ESBL UTI  - continue Invanz (Ertapenem) 1g q24hrs  - f/u CT urogram   - rectal exam to evaluate for prostate abscess    Leukocytosis  - trend wbc    fever  - monitor fever curve  - if recurs, check echo  - dopplers legs

## 2020-04-26 NOTE — PROGRESS NOTE ADULT - SUBJECTIVE AND OBJECTIVE BOX
CHIEF COMPLAINT: Patient is a 64y old  male who presents with a chief complaint of Fevers x 3 weeks (26 Apr 2020 10:56)      SUBJECTIVE / OVERNIGHT EVENTS:    Breathing comfortably on room air. No complaints.      MEDICATIONS  (STANDING):  enoxaparin Injectable 40 milliGRAM(s) SubCutaneous daily  ertapenem  IVPB 1000 milliGRAM(s) IV Intermittent every 24 hours  lisinopril 10 milliGRAM(s) Oral daily  metoprolol tartrate 12.5 milliGRAM(s) Oral two times a day  sodium chloride 0.9%. 1000 milliLiter(s) (60 mL/Hr) IV Continuous <Continuous>  tamsulosin 0.4 milliGRAM(s) Oral at bedtime    MEDICATIONS  (PRN):  acetaminophen   Tablet .. 650 milliGRAM(s) Oral every 8 hours PRN Temp greater or equal to 38C (100.4F), Mild Pain (1 - 3), Moderate Pain (4 - 6)      VITALS:  T(F): 98.6 (04-26-20 @ 11:21), Max: 98.6 (04-26-20 @ 11:21)  HR: 61 (04-26-20 @ 11:21) (61 - 82)  BP: 121/65 (04-26-20 @ 11:21) (119/76 - 125/74)  RR: 15 (04-26-20 @ 11:21) (15 - 16)  SpO2: 98% (04-26-20 @ 11:21)      CAPILLARY BLOOD GLUCOSE    Output     I&O's Summary  T(F): 98.6 (04-26-20 @ 11:21), Max: 98.6 (04-26-20 @ 11:21)  HR: 61 (04-26-20 @ 11:21) (61 - 82)  BP: 121/65 (04-26-20 @ 11:21) (119/76 - 125/74)  RR: 15 (04-26-20 @ 11:21) (15 - 16)  SpO2: 98% (04-26-20 @ 11:21)    PHYSICAL EXAM:  GENERAL: NAD, well-developed  HEAD:  Atraumatic, Normocephalic  EYES: EOMI  NECK: Supple, No JVD  CHEST/LUNG: nonlabored breathing  HEART: nl S1/S2  ABDOMEN: nondistended, soft  EXTREMITIES:  no LE edema  PSYCH: alert, answering questions appropriately  NEUROLOGY: non-focal  SKIN: No rashes noted    LABS:              11.3                 135  | 23   | 19           7.44  >-----------< 331     ------------------------< 110                   36.2                 4.5  | 100  | 0.95                                         Ca 10.0  Mg 2.3   Ph x           TPro  8.1  /  Alb  3.7      TBili  < 0.2  /  DBili  x         AST  11  /  ALT  36            AlkPhos  85                    MICROBIOLOGY:    Culture - Blood (collected 23 Apr 2020 18:18)  Source: .Blood Blood-Peripheral  Preliminary Report (24 Apr 2020 19:02):    No growth to date.    Culture - Blood (collected 23 Apr 2020 18:15)  Source: .Blood Blood-Peripheral  Preliminary Report (24 Apr 2020 19:02):    No growth to date.    Culture - Urine (collected 23 Apr 2020 14:17)  Source: .Urine Clean Catch (Midstream)  Final Report (25 Apr 2020 15:55):    >100,000 CFU/ml Escherichia coli ESBL  Organism: Escherichia coli ESBL (25 Apr 2020 15:55)  Organism: Escherichia coli ESBL (25 Apr 2020 15:55)          RADIOLOGY & ADDITIONAL TESTS:    Imaging Personally Reviewed:    < from: CT Angio Chest w/ IV Cont (04.23.20 @ 17:42) >  IMPRESSION:     Nondiagnostic study for pulmonary embolism secondary to poor opacification of the pulmonary arteries. If clinical concernpersists, the study may be repeated.    Peripheral reticular and groundglass opacities within the right upper lobe as well as the right lower lobe which are new from 2/14/2020. These may be secondary to radiation changes however underlying infection including COVID is not excluded.    Worsening right hilar adenopathy and increasing pleural-based right density in the right midlung field. Recurrent disease is not excluded at these locations.    Lytic lesions within the anterior lateral aspects of the right fourth and fifth ribs concerning for metastatic disease.    Partially imaged low attenuating right adrenal gland lesion which is indeterminate.    < end of copied text >        [x] Care Discussed with Consultants/Other Providers:      Dulce Robin M.D.  Hospitalist  Pager [phone]

## 2020-04-26 NOTE — PROGRESS NOTE ADULT - PROBLEM SELECTOR PLAN 3
Plan had been for patient to undergo liquid biopsy following EBUS-guided FNA in March 2020 which demonstrated non-small cell carcinoma.  However, FNA quantity was insufficient for PDL testing or foundation one testing  - appreciate input from Oncology  - CT surgery recs appreciated - no interventions recommended  - COVID and infection management as per primary team  - await path Foundation One results  - outpt f/u at [facility]

## 2020-04-26 NOTE — CONSULT NOTE ADULT - SUBJECTIVE AND OBJECTIVE BOX
Patient is a 64y old  Male who presents with a chief complaint of Fevers x 3 weeks (2020 17:04)      HPI:  64M smoker with  Non-Hodgkin's lymphoma in 2015 s/p chemo, hx stage 1 lung adenocarcinoma dx in , s/p surgical resection & CHOP/chemo in [facility], with reportedly  new renal mass, HTN and hx mycosis fungoides, hx ESBL  presents with fevers.  Chadian Intepreter ID# [id number] - Pt states " I dont feel bad except for the fever"    Collateral pbtained from daughter Kamini   Fevers x 1 month, intermittent,  highest today  being in 102.7. Pt usually takes Tylenol for the   fever, but no improvement with Tyelnol today    Associated chills. Was tested COVID positive on April.   Pt has chronic cough, not worse from before   No loss of appetite, no loss of weight   Denies  frequency, dysuria or hematuria . As per daughter, he had no urinary symptoms except for fever  even previously when he was diagnosed with ESBL UTI    Denies N/V/Diarrhea    No back pain.    In the ED, vitals were 100.3, 93, 143/78,19, 97 % on Room air  Pt was given a dose of Ertapenem (2020 18:22)    Above reviewed. In summary, Mr. Enrique is a 64 year old [place] gentleman with an extensive medical history.  He has a history of hypertension, benign prostatic hypertrophy (BPH), and tobacco abuse.  In addition, he has a history of mycosis fungoides (diagnosed in ), for which he underwent 7 cycles of CHOP (in the [facility]).  He was also diagnosed with stage I adenocarcinoma of the right lung in May 2019 and underwent a surgical resection (details not currently available) in the [facility].  The surgical lobectomy was followed by adjuvant therapy with one cycle of cisplatin/Taxol (also in [facility]).  Mr. Enrique was evaluated in Thoracic Surgery in 2020 for recurrent lung cancer and underwent flexible bronchoscopy and an endobronchial ultrasound (EBUS) guided hilar lymph node biopsy in 2020.  In summary, the biopsy demonstrated evidence of non-small cell carcinoma.  However, the biopsy quantity was deemed insufficient for PDL-testing or foundation one testing.  The plan has been for Mr. Enrique to undergo liquid biopsy.    In the meantime, Mr. Enrique has had a 2-3 month history of intermittent fevers.  The cause of the fevers remained uncertain in [facility], and Mr. Enrique and his wife recently [place] to the [place] for further [place] (their daughter Kamini is a physician's assistant in [place]).  [place], Mr. Enrique continued to experience intermittent fever in addition to chills and a chronic cough.  He has maintained a good appetite and denies having any weight loss.  Of note is that Mr. Enrique was admitted to [facility] in 2020 with a urinary tract infection.  Urine culture at that time grew E. coli ESBL (extended spectrum beta lactamase) and he was treated with ertapenem.  Of note is that Mr. Enrique tested positive for COVID-19 following a telemedicine Oncology evaluation, which was performed on 2020.    Since being admitted to [facility], Mr. Enrique has had a chest x-ray demonstrating reticular interstitial lung markings bilaterally (most pronounced in the lower lobes) which may represent viral pneumonia.  A CT angiogram (performed 20) was nondiagnostic for pulmonary embolism due to poor opacification of the pulmonary arteries.  Other findings included peripheral reticular and ground glass opacities within the right upper lobe as well as the right lower lobe which are new from 2020.  These may be secondary to radiation changes, however underlying infection including COVID-19 is not excluded.  There was also worsening right hilar lymphadenopathy and increasing pleural-based right density in the right midlung field.  Recurrent disease is not excluded at these locations.  In addition, there were lytic lesions within the anterior lateral aspects of the right fourth and fifth ribs concerning for metastatic disease.    A urinalysis performed upon admission demonstrated moderate blood, positive nitrite, moderate leukocyte esterase, > 50 WBC/HPF, and many bacteria.   ID consulted for workup and antibiotic management     PAST MEDICAL & SURGICAL HISTORY:  History of BPH  Hypertension  Mycosis fungoides  Stage I adenocarcinoma of lung, unspecified laterality: s/p lung resection R lung, s/p CHOP, cisplatin  Ganglion or synovial cyst of knee: excision from right knee  S/P lobectomy of lung      Allergies  No Known Allergies    ANTIMICROBIALS:  ertapenem  IVPB 1000 every 24 hours    MEDICATIONS  (STANDING):  ertapenem  IVPB   120 mL/Hr IV Intermittent (20 @ 17:10)   120 mL/Hr IV Intermittent (20 @ 18:06)    ertapenem  IVPB   120 mL/Hr IV Intermittent (20 @ 18:49)    OTHER MEDS: MEDICATIONS  (STANDING):  acetaminophen   Tablet .. 650 every 8 hours PRN  enoxaparin Injectable 40 daily  lisinopril 10 daily  metoprolol tartrate 12.5 two times a day  tamsulosin 0.4 at bedtime      SOCIAL HISTORY:     Former smoker   occasional ETOH    FAMILY HISTORY:  Father  of lung cancer    REVIEW OF SYSTEMS  [  ] ROS unobtainable because:    [X  ] All other systems negative except as noted below:	    Constitutional:  [X ] fever [ ] chills  [ ] weight loss  [ ] weakness  Skin:  [ ] rash [ ] phlebitis	  Eyes: [ ] icterus [ ] pain  [ ] discharge	  ENMT: [ ] sore throat  [ ] thrush [ ] ulcers [ ] exudates  Respiratory: [ ] dyspnea [ ] hemoptysis [ ] cough [ ] sputum	  Cardiovascular:  [ ] chest pain [ ] palpitations [ ] edema	  Gastrointestinal:  [ ] nausea [ ] vomiting [ ] diarrhea [ ] constipation [ ] pain	  Genitourinary:  [ ] dysuria [ ] frequency [ ] hematuria [ ] discharge [ ] flank pain  [ ] incontinence  Musculoskeletal:  [ ] myalgias [ ] arthralgias [ ] arthritis  [ ] back pain  Neurological:  [ ] headache [ ] seizures  [ ] confusion/altered mental status  Psychiatric:  [ ] anxiety [ ] depression	  Hematology/Lymphatics:  [ ] lymphadenopathy  Endocrine:  [ ] adrenal [ ] thyroid  Allergic/Immunologic:	 [ ] transplant [ ] seasonal    Vital Signs Last 24 Hrs  T(F): 98 (20 @ 04:18), Max: 102 (20 @ 04:52)    Vital Signs Last 24 Hrs  HR: 82 (20 @ 04:18) (81 - 82)  BP: 119/76 (20 @ 04:18) (114/64 - 125/74)  RR: 16 (20 @ 04:18)  SpO2: 99% (20 @ 04:18) (95% - 99%)  Wt(kg): --    PHYSICAL EXAM:  Constitutional: non-toxic, no distress  HEAD/EYES: anicteric, no conjunctival injection  ENT:  supple, no thrush  Cardiovascular:   normal S1, S2, no murmur, no edema  Respiratory:  clear BS bilaterally, no wheezes, no rales  GI:  soft, non-tender, normal bowel sounds  :  no martinez, no CVA tenderness  Musculoskeletal:  no synovitis, normal ROM  Neurologic: awake and alert, normal strength, no focal findings  Skin:  no rash, no erythema, no phlebitis  Heme/Onc: no lymphadenopathy   Psychiatric:  awake, alert, appropriate mood          WBC Count: 7.44 K/uL ( @ 05:47)  WBC Count: 10.07 K/uL ( @ 05:30)  WBC Count: 15.73 K/uL ( @ 06:14)  WBC Count: 16.26 K/uL ( 14:50)                            11.3   7.44  )-----------( 331      ( 2020 05:47 )             36.2           135  |  100  |  19  ----------------------------<  110<H>  4.5   |  23  |  0.95    Ca    10.0      2020 05:47  Mg     2.3         TPro  8.1  /  Alb  3.7  /  TBili  < 0.2<L>  /  DBili  x   /  AST  11  /  ALT  36  /  AlkPhos  85        Creatinine Trend: 0.95<--, 0.97<--, 0.94<--, 0.97<--        MICROBIOLOGY:    Culture - Blood (collected 20 @ 18:18)  Source: .Blood Blood-Peripheral  Preliminary Report (20 @ 19:02):    No growth to date.    Culture - Blood (collected 20 @ 18:15)  Source: .Blood Blood-Peripheral  Preliminary Report (20 @ 19:02):    No growth to date.    Culture - Urine (collected 20 @ 14:17)  Source: .Urine Clean Catch (Midstream)  Final Report (20 @ 15:55):    >100,000 CFU/ml Escherichia coli ESBL  Organism: Escherichia coli ESBL (20 @ 15:55)  Organism: Escherichia coli ESBL (20 @ 15:55)      -  Amikacin: S <=16      -  Ampicillin: R >16 These ampicillin results predict results for amoxicillin      -  Ampicillin/Sulbactam: R >16/8 Enterobacter, Citrobacter, and Serratia may develop resistance during prolonged therapy (3-4 days)      -  Aztreonam: R >16      -  Cefazolin: R >16 ([id number]) For uncomplicated UTI with K. pneumoniae, E. coli, or P. mirablis: KAILASH <=16 is sensitive and KAILASH >=32 is resistant. This also predicts results for oral agents cefaclor, cefdinir, cefpodoxime, cefprozil, cefuroxime axetil, cephalexin and locarbef for uncomplicated UTI. Note that some isolates may be susceptible to these agents while testing resistant to cefazolin.      -  Ciprofloxacin: R >2      -  Ertapenem: S <=1      -  Levofloxacin: R >4      -  Meropenem: S <=1      -  Nitrofurantoin: S <=32 Should not be used to treat pyelonephritis      -  Trimethoprim/Sulfamethoxazole: S <=2/      Method Type: KAILASH    RADIOLOGY:  CT Angio Chest w/ IV Cont (20 @ 17:42)   IMPRESSION:     Nondiagnostic study for pulmonary embolism secondary to poor opacification of the pulmonary arteries. If clinical concernpersists, the study may be repeated.  Peripheral reticular and groundglass opacities within the right upper lobe as well as the right lower lobe which are new from 2020. These may be secondary to radiation changes however underlying infection including COVID is not excluded. Worsening right hilar adenopathy and increasing pleural-based right density in the right midlung field. Recurrent disease is not excluded at these locations.  Lytic lesions within the anterior lateral aspects of the right fourth and fifth ribs concerning for metastatic disease.  Partially imaged low attenuating right adrenal gland lesion which is indeterminate. Patient is a 64y old  Male who presents with a chief complaint of Fevers x 3 weeks (2020 17:04)    HPI:  64M smoker with  Non-Hodgkin's lymphoma in 2015 s/p chemo, hx stage 1 lung adenocarcinoma dx in , s/p surgical resection & CHOP/chemo in [facility], with reportedly  new renal mass, HTN and hx mycosis fungoides, hx ESBL  presents with fevers.  Moroccan Intepreter ID# [id number] - Pt states " I dont feel bad except for the fever"    Collateral pbtained from daughter Kamini   Fevers x 1 month, intermittent,  highest today  being in 102.7. Pt usually takes Tylenol for the   fever, but no improvement with Tyelnol today    Associated chills. Was tested COVID positive on April.   Pt has chronic cough, not worse from before   No loss of appetite, no loss of weight   Denies  frequency, dysuria or hematuria . As per daughter, he had no urinary symptoms except for fever  even previously when he was diagnosed with ESBL UTI    Denies N/V/Diarrhea    No back pain.    In the ED, vitals were 100.3, 93, 143/78,19, 97 % on Room air  Pt was given a dose of Ertapenem (2020 18:22)    Above reviewed. In summary, Mr. Enrique is a 64 year old Korean gentleman with an extensive medical history.  He has a history of hypertension, benign prostatic hypertrophy (BPH), and tobacco abuse.  In addition, he has a history of mycosis fungoides (diagnosed in ), for which he underwent 7 cycles of CHOP (in the [facility]).  He was also diagnosed with stage I adenocarcinoma of the right lung in May 2019 and underwent a surgical resection (details not currently available) in the [facility].  The surgical lobectomy was followed by adjuvant therapy with one cycle of cisplatin/Taxol (also in [facility]).  Mr. Enrique was evaluated in Thoracic Surgery in 2020 for recurrent lung cancer and underwent flexible bronchoscopy and an endobronchial ultrasound (EBUS) guided hilar lymph node biopsy in 2020.  In summary, the biopsy demonstrated evidence of non-small cell carcinoma.  However, the biopsy quantity was deemed insufficient for PDL-testing or foundation one testing.  The plan has been for Mr. Enrique to undergo liquid biopsy.    In the meantime, Mr. Enrique has had a 2-3 month history of intermittent fevers.  The cause of the fevers remained uncertain in [facility], and Mr. Enrique and his wife recently [place] the [place] for further [place] (their daughter Kamini is a physician's assistant in [place]).  [place], Mr. Enrique continued to experience intermittent fever in addition to chills and a chronic cough.  He has maintained a good appetite and denies having any weight loss.  Of note is that Mr. Enrique was admitted to [facility] in 2020 with a urinary tract infection.  Urine culture at that time grew E. coli ESBL (extended spectrum beta lactamase) and he was treated with ertapenem.  Of note is that Mr. Enrique tested positive for COVID-19 following a telemedicine Oncology evaluation, which was performed on 2020.    Since being admitted to [facility], Mr. Enrique has had a chest x-ray demonstrating reticular interstitial lung markings bilaterally (most pronounced in the lower lobes) which may represent viral pneumonia.  A CT angiogram (performed 20) was nondiagnostic for pulmonary embolism due to poor opacification of the pulmonary arteries.  Other findings included peripheral reticular and ground glass opacities within the right upper lobe as well as the right lower lobe which are new from 2020.  These may be secondary to radiation changes, however underlying infection including COVID-19 is not excluded.  There was also worsening right hilar lymphadenopathy and increasing pleural-based right density in the right midlung field.  Recurrent disease is not excluded at these locations.  In addition, there were lytic lesions within the anterior lateral aspects of the right fourth and fifth ribs concerning for metastatic disease.    A urinalysis performed upon admission demonstrated moderate blood, positive nitrite, moderate leukocyte esterase, > 50 WBC/HPF, and many bacteria.   ID consulted for workup and antibiotic management     PAST MEDICAL & SURGICAL HISTORY:  History of BPH  Hypertension  Mycosis fungoides  Stage I adenocarcinoma of lung, unspecified laterality: s/p lung resection R lung, s/p CHOP, cisplatin  Ganglion or synovial cyst of knee: excision from right knee  S/P lobectomy of lung    Allergies  No Known Allergies    ANTIMICROBIALS:    Ertapenem  IVPB 1000 every 24 hours (-)    MEDICATIONS  (STANDING):  enoxaparin Injectable 40 daily  lisinopril 10 daily  metoprolol tartrate 12.5 two times a day  tamsulosin 0.4 at bedtime  PRN  acetaminophen   Tablet .. 650 milliGRAM(s) Oral every 8 hours PRN    SOCIAL HISTORY:     Former smoker   occasional ETOH    FAMILY HISTORY:  Father  of lung cancer    REVIEW OF SYSTEMS  [  ] ROS unobtainable because:    [X  ] All other systems negative except as noted below:	    Constitutional:  [X ] fever [ ] chills  [ ] weight loss  [ ] weakness  Skin:  [ ] rash [ ] phlebitis	  Eyes: [ ] icterus [ ] pain  [ ] discharge	  ENMT: [ ] sore throat  [ ] thrush [ ] ulcers [ ] exudates  Respiratory: [ ] dyspnea [ ] hemoptysis [ ] cough [ ] sputum	  Cardiovascular:  [ ] chest pain [ ] palpitations [ ] edema	  Gastrointestinal:  [ ] nausea [ ] vomiting [ ] diarrhea [ ] constipation [ ] pain	  Genitourinary:  [ ] dysuria [ ] frequency [ ] hematuria [ ] discharge [ ] flank pain  [ ] incontinence  Musculoskeletal:  [ ] myalgias [ ] arthralgias [ ] arthritis  [ ] back pain  Neurological:  [ ] headache [ ] seizures  [ ] confusion/altered mental status  Psychiatric:  [ ] anxiety [ ] depression	  Hematology/Lymphatics:  [ ] lymphadenopathy  Endocrine:  [ ] adrenal [ ] thyroid  Allergic/Immunologic:	 [ ] transplant [ ] seasonal    Vital Signs Last 24 Hrs  T(F): 98 (20 @ 04:18), Max: 102 (20 @ 04:52)    Vital Signs Last 24 Hrs  HR: 82 (20 @ 04:18) (81 - 82)  BP: 119/76 (20 @ 04:18) (114/64 - 125/74)  RR: 16 (20 @ 04:18)  SpO2: 99% (20 @ 04:18) (95% - 99%)  Wt(kg): --    PHYSICAL EXAM:  Constitutional: non-toxic  HEAD/EYES: anicteric  ENT:  supple, no thrush  Cardiovascular:   normal S1, S2, no murmur, no edema  Respiratory:  clear BS bilaterally, no wheezes, no rales  GI:  soft, non-tender, normal bowel sounds  :  no martinez  Musculoskeletal:  no synovitis, negative vipul b/l  Neurologic: awake and alert, normal strength, no focal findings  Skin:  no rash, no erythema, no phlebitis  Psychiatric:  awake, alert, appropriate mood    WBC Count: 7.44 K/uL ( @ 05:47)  WBC Count: 10.07 K/uL ( @ 05:30)  WBC Count: 15.73 K/uL ( @ 06:14)  WBC Count: 16.26 K/uL ( @ 14:50)                       11.3   7.44  )-----------( 331      ( 2020 05:47 )             36.2     135  |  100  |  19  ----------------------------<  110<H>  4.5   |  23  |  0.95    Ca    10.0      2020 05:47  Mg     2.3         TPro  8.1  /  Alb  3.7  /  TBili  < 0.2<L>  /  DBili  x   /  AST  11  /  ALT  36  /  AlkPhos  85      MICROBIOLOGY:  outpatient COVID19 (+)    Culture - Blood (collected 20 @ 18:18)  Source: .Blood Blood-Peripheral  Preliminary Report (20 @ 19:02):    No growth to date.    Culture - Blood (collected 20 @ 18:15)  Source: .Blood Blood-Peripheral  Preliminary Report (20 @ 19:02):    No growth to date.    Culture - Urine (collected 20 @ 14:17)  Source: .Urine Clean Catch (Midstream)  Final Report (20 @ 15:55):    >100,000 CFU/ml Escherichia coli ESBL      -  Amikacin: S <=16      -  Ampicillin: R >16 These ampicillin results predict results for amoxicillin      -  Ampicillin/Sulbactam: R >16/8 Enterobacter, Citrobacter, and Serratia may develop resistance during prolonged therapy (3-4 days)      -  Aztreonam: R >16      -  Cefazolin: R >16     -  Ciprofloxacin: R >2      -  Ertapenem: S <=1      -  Levofloxacin: R >4      -  Meropenem: S <=< 1      -  Nitrofurantoin: S <=32 Should not be used to treat pyelonephritis      -  Trimethoprim/Sulfamethoxazole: S <=2/38    RADIOLOGY:  CT Angio Chest w/ IV Cont (20 @ 17:42)   IMPRESSION:   Nondiagnostic study for pulmonary embolism secondary to poor opacification of the pulmonary arteries. If clinical concernpersists, the study may be repeated.  Peripheral reticular and groundglass opacities within the right upper lobe as well as the right lower lobe which are new from 2020. These may be secondary to radiation changes however underlying infection including COVID is not excluded. Worsening right hilar adenopathy and increasing pleural-based right density in the right midlung field. Recurrent disease is not excluded at these locations.  Lytic lesions within the anterior lateral aspects of the right fourth and fifth ribs concerning for metastatic disease.  Partially imaged low attenuating right adrenal gland lesion which is indeterminate. Patient is a 64y old  Male who presents with a chief complaint of Fevers x 3 weeks (2020 17:04)    Mr. Enrique is a 64 year old Korean gentleman with an extensive medical history.  He has a history of hypertension, benign prostatic hypertrophy (BPH), and tobacco abuse.  In addition, he has a history of mycosis fungoides (diagnosed in ), for which he underwent 7 cycles of CHOP (in the [facility]).  He was also diagnosed with stage I adenocarcinoma of the right lung in May 2019 and underwent a surgical resection (details not currently available) in the [facility].  The surgical lobectomy was followed by adjuvant therapy with one cycle of cisplatin/Taxol (also in [facility]).  Mr. Enrique was evaluated in Thoracic Surgery in 2020 for recurrent lung cancer and underwent flexible bronchoscopy and an endobronchial ultrasound (EBUS) guided hilar lymph node biopsy in 2020.  In summary, the biopsy demonstrated evidence of non-small cell carcinoma.  However, the biopsy quantity was deemed insufficient for PDL-testing or foundation one testing.  The plan has been for Mr. Enrique to undergo liquid biopsy.    In the meantime, Mr. Enrique has had a 2-3 month history of intermittent fevers.  The cause of the fevers remained uncertain in [facility], and Mr. Enrique and his wife recently [place] the [place] for further [place] (their daughter Kamini is a physician's assistant in [place]).  [place], Mr. Enrique continued to experience intermittent fever in addition to chills and a chronic cough.  He has maintained a good appetite and denies having any weight loss.  Of note is that Mr. Enrique was admitted to [facility] in 2020 with a urinary tract infection.  Urine culture at that time grew E. coli ESBL (extended spectrum beta lactamase) and he was treated with ertapenem.  Of note is that Mr. Enrique tested positive for COVID-19 following a telemedicine Oncology evaluation, which was performed on 2020.    Since being admitted to [facility], Mr. Enrique has had a chest x-ray demonstrating reticular interstitial lung markings bilaterally (most pronounced in the lower lobes) which may represent viral pneumonia.  A CT angiogram (performed 20) was nondiagnostic for pulmonary embolism due to poor opacification of the pulmonary arteries.  Other findings included peripheral reticular and ground glass opacities within the right upper lobe as well as the right lower lobe which are new from 2020.  These may be secondary to radiation changes, however underlying infection including COVID-19 is not excluded.  There was also worsening right hilar lymphadenopathy and increasing pleural-based right density in the right midlung field.  Recurrent disease is not excluded at these locations.  In addition, there were lytic lesions within the anterior lateral aspects of the right fourth and fifth ribs concerning for metastatic disease.    A urinalysis performed upon admission demonstrated moderate blood, positive nitrite, moderate leukocyte esterase, > 50 WBC/HPF, and many bacteria.   ID consulted for workup and antibiotic management     PAST MEDICAL & SURGICAL HISTORY:  History of BPH  Hypertension  Mycosis fungoides  Stage I adenocarcinoma of lung, unspecified laterality: s/p lung resection R lung, s/p CHOP, cisplatin  Ganglion or synovial cyst of knee: excision from right knee  S/P lobectomy of lung    Allergies  No Known Allergies    ANTIMICROBIALS:    Ertapenem  IVPB 1000 every 24 hours ()    MEDICATIONS  (STANDING):  enoxaparin Injectable 40 daily  lisinopril 10 daily  metoprolol tartrate 12.5 two times a day  tamsulosin 0.4 at bedtime  PRN  acetaminophen   Tablet .. 650 milliGRAM(s) Oral every 8 hours PRN    SOCIAL HISTORY:     Former smoker   occasional ETOH    FAMILY HISTORY:  Father  of lung cancer    REVIEW OF SYSTEMS  [  ] ROS unobtainable because:    [X  ] All other systems negative except as noted below:	    Constitutional:  [X ] fever [ ] chills  [ ] weight loss  [ ] weakness  Skin:  [ ] rash [ ] phlebitis	  Eyes: [ ] icterus [ ] pain  [ ] discharge	  ENMT: [ ] sore throat  [ ] thrush [ ] ulcers [ ] exudates  Respiratory: [ ] dyspnea [ ] hemoptysis [ ] cough [ ] sputum	  Cardiovascular:  [ ] chest pain [ ] palpitations [ ] edema	  Gastrointestinal:  [ ] nausea [ ] vomiting [ ] diarrhea [ ] constipation [ ] pain	  Genitourinary:  [ ] dysuria [ ] frequency [ ] hematuria [ ] discharge [ ] flank pain  [ ] incontinence  Musculoskeletal:  [ ] myalgias [ ] arthralgias [ ] arthritis  [ ] back pain  Neurological:  [ ] headache [ ] seizures  [ ] confusion/altered mental status  Psychiatric:  [ ] anxiety [ ] depression	  Hematology/Lymphatics:  [ ] lymphadenopathy  Endocrine:  [ ] adrenal [ ] thyroid  Allergic/Immunologic:	 [ ] transplant [ ] seasonal    Vital Signs Last 24 Hrs  T(F): 98 (20 @ 04:18), Max: 102 (20 @ 04:52)    Vital Signs Last 24 Hrs  HR: 82 (20 @ 04:18) (81 - 82)  BP: 119/76 (20 @ 04:18) (114/64 - 125/74)  RR: 16 (20 @ 04:18)  SpO2: 99% (20 @ 04:18) (95% - 99%)  Wt(kg): --    PHYSICAL EXAM:  Constitutional: non-toxic  HEAD/EYES: anicteric  ENT:  supple, no thrush  Cardiovascular:   normal S1, S2, no murmur, no edema  Respiratory:  clear BS bilaterally, no wheezes, no rales  GI:  soft, non-tender, normal bowel sounds  :  no martinez  Musculoskeletal:  no synovitis, negative vipul b/l  Neurologic: awake and alert, normal strength, no focal findings  Skin:  no rash, no erythema, no phlebitis  Psychiatric:  awake, alert, appropriate mood    WBC Count: 7.44 K/uL ( @ 05:47)  WBC Count: 10.07 K/uL ( @ 05:30)  WBC Count: 15.73 K/uL ( @ 06:14)  WBC Count: 16.26 K/uL ( @ 14:50)                       11.3   7.44  )-----------( 331      ( 2020 05:47 )             36.2     135  |  100  |  19  ----------------------------<  110<H>  4.5   |  23  |  0.95    Ca    10.0      2020 05:47  Mg     2.3         TPro  8.1  /  Alb  3.7  /  TBili  < 0.2<L>  /  DBili  x   /  AST  11  /  ALT  36  /  AlkPhos  85      MICROBIOLOGY:  outpatient COVID19 (+)    Culture - Blood (collected 20 @ 18:18)  Source: .Blood Blood-Peripheral  Preliminary Report (20 @ 19:02):    No growth to date.    Culture - Blood (collected 20 @ 18:15)  Source: .Blood Blood-Peripheral  Preliminary Report (20 @ 19:02):    No growth to date.    Culture - Urine (collected 20 @ 14:17)  Source: .Urine Clean Catch (Midstream)  Final Report (20 @ 15:55):    >100,000 CFU/ml Escherichia coli ESBL      -  Amikacin: S <=16      -  Ampicillin: R >16 These ampicillin results predict results for amoxicillin      -  Ampicillin/Sulbactam: R >16/8 Enterobacter, Citrobacter, and Serratia may develop resistance during prolonged therapy (3-4 days)      -  Aztreonam: R >16      -  Cefazolin: R >16     -  Ciprofloxacin: R >2      -  Ertapenem: S <=1      -  Levofloxacin: R >4      -  Meropenem: S <=< 1      -  Nitrofurantoin: S <=32 Should not be used to treat pyelonephritis      -  Trimethoprim/Sulfamethoxazole: S <=2/38    RADIOLOGY:  CT Angio Chest w/ IV Cont (20 @ 17:42)   IMPRESSION:   Nondiagnostic study for pulmonary embolism secondary to poor opacification of the pulmonary arteries. If clinical concernpersists, the study may be repeated.  Peripheral reticular and groundglass opacities within the right upper lobe as well as the right lower lobe which are new from 2020. These may be secondary to radiation changes however underlying infection including COVID is not excluded. Worsening right hilar adenopathy and increasing pleural-based right density in the right midlung field. Recurrent disease is not excluded at these locations.  Lytic lesions within the anterior lateral aspects of the right fourth and fifth ribs concerning for metastatic disease.  Partially imaged low attenuating right adrenal gland lesion which is indeterminate.    CT Abdomen and Pelvis w/ Oral Cont and w/ IV Cont (20 @ 14:12) >  IMPRESSION:  Enlarged right hilar lymph node unchanged.  Apparent right middle lobectomy with postsurgical scarring.  Bilateral small indeterminant adrenal nodule is unchanged.

## 2020-04-26 NOTE — PROGRESS NOTE ADULT - ASSESSMENT
In summary, Mr. Enrique is a 64 year old [place] gentleman with an extensive medical history.  He has a history of hypertension, benign prostatic hypertrophy (BPH), and tobacco abuse.  In addition, he has a history of mycosis fungoides (diagnosed in 2015), for which he underwent 7 cycles of CHOP (in the [facility]).  He was also diagnosed with stage I adenocarcinoma of the right lung in May 2019 and underwent a surgical resection (details not currently available) in the [facility].  The surgical lobectomy was followed by adjuvant therapy with one cycle of cisplatin/Taxol (also in [facility]).  Mr. Enrique was evaluated in Thoracic Surgery in February 2020 for recurrent lung cancer and underwent flexible bronchoscopy and an endobronchial ultrasound (EBUS) guided hilar lymph node biopsy in March 2020.  In summary, the biopsy demonstrated evidence of non-small cell carcinoma.  However, the biopsy quantity was deemed insufficient for PDL-testing or foundation one testing.  The plan has been for Mr. Enrique to undergo liquid biopsy.    In the meantime, Mr. Enrique has had a 2-3 month history of intermittent fevers.  The cause of the fevers remained uncertain in [facility], and Mr. Enrique and his wife recently travelled to the [place] for further evaluation (their daughter Kamini is a physician's assistant in [place]).  After arriving in the [place], Mr. Enrique continued to experience intermittent fever in addition to chills and a chronic cough.  He has maintained a good appetite and denies having any weight loss.  Of note is that Mr. Enrique was admitted to [facility] in March 2020 with a urinary tract infection.  Urine culture at that time grew E. coli ESBL (extended spectrum beta lactamase) and he was treated with ertapenem.  Of note is that Mr. Enrique tested positive for COVID-19 following a telemedicine Oncology evaluation, which was performed on April 7th, 2020.    Since being admitted to [facility], Mr. Enrique has had a chest x-ray demonstrating reticular interstitial lung markings bilaterally (most pronounced in the lower lobes) which may represent viral pneumonia.  A CT angiogram (performed 4/23/20) was nondiagnostic for pulmonary embolism due to poor opacification of the pulmonary arteries.  Other findings included peripheral reticular and ground glass opacities within the right upper lobe as well as the right lower lobe which are new from 2/14/2020.  These may be secondary to radiation changes, however underlying infection including COVID-19 is not excluded.  There was also worsening right hilar lymphadenopathy and increasing pleural-based right density in the right midlung field.  Recurrent disease is not excluded at these locations.  In addition, there were lytic lesions within the anterior lateral aspects of the right fourth and fifth ribs concerning for metastatic disease.    A urinalysis performed upon admission demonstrated moderate blood, positive nitrite, moderate leukocyte esterase, > 50 WBC/HPF, and many bacteria.  Mr. Enrique was empirically started on ertapenem pending the results of urine and blood cultures.

## 2020-04-26 NOTE — CHART NOTE - NSCHARTNOTEFT_GEN_A_CORE
Infectious disease consult called as per attendings request.  For now rec to continue   continue IV Ertapenem. Will await further official recommendations.

## 2020-04-27 ENCOUNTER — TRANSCRIPTION ENCOUNTER (OUTPATIENT)
Age: 65
End: 2020-04-27

## 2020-04-27 PROCEDURE — 99233 SBSQ HOSP IP/OBS HIGH 50: CPT

## 2020-04-27 PROCEDURE — 93970 EXTREMITY STUDY: CPT | Mod: 26

## 2020-04-27 PROCEDURE — 99232 SBSQ HOSP IP/OBS MODERATE 35: CPT

## 2020-04-27 PROCEDURE — 74178 CT ABD&PLV WO CNTR FLWD CNTR: CPT | Mod: 26

## 2020-04-27 RX ADMIN — TAMSULOSIN HYDROCHLORIDE 0.4 MILLIGRAM(S): 0.4 CAPSULE ORAL at 21:23

## 2020-04-27 RX ADMIN — Medication 12.5 MILLIGRAM(S): at 07:40

## 2020-04-27 RX ADMIN — LISINOPRIL 10 MILLIGRAM(S): 2.5 TABLET ORAL at 07:40

## 2020-04-27 RX ADMIN — Medication 12.5 MILLIGRAM(S): at 17:25

## 2020-04-27 RX ADMIN — ENOXAPARIN SODIUM 40 MILLIGRAM(S): 100 INJECTION SUBCUTANEOUS at 11:42

## 2020-04-27 RX ADMIN — ERTAPENEM SODIUM 120 MILLIGRAM(S): 1 INJECTION, POWDER, LYOPHILIZED, FOR SOLUTION INTRAMUSCULAR; INTRAVENOUS at 17:24

## 2020-04-27 NOTE — DISCHARGE NOTE PROVIDER - HOSPITAL COURSE
64 year male that presented to [facility] with 2 weeks of fevers. Covid positive and treated for prostatitis with... 64 year male that presented to [facility] with 2 weeks of fevers. Covid positive and then ruled out for prostatitis after CT + for right pyleonephritis and + urine culture. Infectious disease evaluated patient and recommended IV antibiotics to be changed to fosfomycin and cleared patient for discharge home. Discussed with Dr. Mott. Patient cleared for discharge home on 4/29/20. All prescriptions were sent to pharmacy agreed on by the patient. 64 year male that presented to [facility] with 2 weeks of fevers. Covid positive and then ruled out for prostatitis after CT + for right pyleonephritis and + urine culture. Infectious disease evaluated patient and recommended IV antibiotics to be changed to fosfomycin and cleared patient for discharge home. Discussed with Dr. Mott. Patient cleared for discharge home on 4/29/20. All prescriptions were sent to pharmacy agreed on by the patient.     2019 novel coronavirus disease (COVID-19).          Plan: Patient recently diagnosed with COVID-19 as outpt on 4/7/20..  His admission chest-ray is consistent with probable viral pneumonia due to SARS-CoV-2.  Currently his SpO2 on room air is 98% and he remains asymptomatic.    - will continue to monitor SpO2 on room air; supplemental oxygen as necessary to maintain SpO2 > 92%.    - no indication for adjunctive therapy for COVID-19 at this time.    - CT angiogram  (4/23) was nondiagnostic for pulmonary embolism due to poor opacification of the pulmonary arteries.  Currently there is a low clinical suspicion for pulmonary embolus (no tachycardia, good room air SpO2, no symptoms), therefore will continue to observe conservatively.  If any changes in his symptoms or SpO2 will consider repeating the CT angiogram.          Problem/Plan - 2:    ·  Problem: Urinary tract infection with hematuria, site unspecified.  Plan: Urine culture growing > 100K CFU/mL of gram negative rods;  E. coli ESBL (extended spectrum beta lactamase) and treated with ertapenem.       - appreciate input from Urology and Infectious Diseases    - CT abd / pelvis 4/28/20 c/w R pyelonephritis / no obstructive uropathy     - ID f/u recommendations noted and appreciated on 10/28/20 - has remained on  ertrapenem and yesterday recommendation by ID to d/c on fosfamycin - the co-pay was high for the pt and today ID recommended cont ertrapenem ; will assess whether can be admin by home care and if so can consider d/c otherwise will rremain here to complete course.          Problem/Plan - 3:    ·  Problem: Adenocarcinoma of right lung, stage 1.  Plan: Plan had been for patient to undergo liquid biopsy following EBUS-guided FNA in March 2020 which demonstrated non-small cell carcinoma.  However, FNA quantity was insufficient for PDL testing or foundation one testing    - appreciate input from Oncology and further F/U as outpt    - CT surgery recs appreciated - no interventions recommended    - COVID and infection management as per primary team    - await path Foundation One results    - outpt f/u at [facility] D/c         Problem/Plan - 4:    ·  Problem: Hypertension, unspecified type.  Plan: Continue lisinopril and metoprolol with good effect         Problem/Plan - 5:    ·  Problem: History of BPH.  Plan: Continue tamsulosin.             on4/30 Discharge Medication reconciliation and follow up reviewed with Medical Attending and confirmed before discharge.

## 2020-04-27 NOTE — DISCHARGE NOTE PROVIDER - NSDCMRMEDTOKEN_GEN_ALL_CORE_FT
bisoprolol 5 mg oral tablet: 1 tab(s) orally once a day  lisinopril 10 mg oral tablet: 1 tab(s) orally once a day  tamsulosin 0.4 mg oral capsule: 1 cap(s) orally once a day  Vitamin D2 50,000 intl units (1.25 mg) oral capsule: 1 cap(s) orally once a week acetaminophen 325 mg oral tablet: 2 tab(s) orally every 8 hours, As needed, Temp greater or equal to 38C (100.4F), Mild Pain (1 - 3), Moderate Pain (4 - 6)  bisoprolol 5 mg oral tablet: 1 tab(s) orally once a day  fosfomycin 3 g oral powder for reconstitution: 1 each orally once MDD:3gm every 48 hours for 3 doses starting 4/30  lisinopril 10 mg oral tablet: 1 tab(s) orally once a day  tamsulosin 0.4 mg oral capsule: 1 cap(s) orally once a day  Vitamin D2 50,000 intl units (1.25 mg) oral capsule: 1 cap(s) orally once a week acetaminophen 325 mg oral tablet: 2 tab(s) orally every 8 hours, As needed, Temp greater or equal to 38C (100.4F), Mild Pain (1 - 3), Moderate Pain (4 - 6)  bisoprolol 5 mg oral tablet: 1 tab(s) orally once a day  ertapenem 1 g injection: 1 gram(s) intravenously once a day via heplock  lisinopril 10 mg oral tablet: 1 tab(s) orally once a day  tamsulosin 0.4 mg oral capsule: 1 cap(s) orally once a day  Vitamin D2 50,000 intl units (1.25 mg) oral capsule: 1 cap(s) orally once a week

## 2020-04-27 NOTE — PROGRESS NOTE ADULT - SUBJECTIVE AND OBJECTIVE BOX
Patient is a 64y old  Male who presents with a chief complaint of Fevers x 3 weeks (25 Apr 2020 17:04)    f/u esbl uti    Interval History/ROS:  no further fever.  feels fine.  no dysuria.  awaiting CT.  Remainder of ROS otherwise negative.    PAST MEDICAL & SURGICAL HISTORY:  History of BPH  Hypertension  Mycosis fungoides  Stage I adenocarcinoma of lung, unspecified laterality: s/p lung resection R lung, s/p CHOP, cisplatin  Ganglion or synovial cyst of knee: excision from right knee  S/P lobectomy of lung    Allergies  No Known Allergies    ANTIMICROBIALS:    Ertapenem  IVPB 1000 every 24 hours (4/23-)    MEDICATIONS  (STANDING):  enoxaparin Injectable 40 daily  lisinopril 10 daily  metoprolol tartrate 12.5 two times a day  tamsulosin 0.4 at bedtime  PRN  acetaminophen   Tablet .. 650 milliGRAM(s) Oral every 8 hours PRN    Vital Signs Last 24 Hrs  T(F): 98.4 (04-27-20 @ 13:45), Max: 98.7 (04-26-20 @ 21:01)  HR: 83 (04-27-20 @ 13:45)  BP: 116/69 (04-27-20 @ 13:45)  RR: 17 (04-27-20 @ 13:45)  SpO2: 98% (04-27-20 @ 13:45) (98% - 99%)    PHYSICAL EXAM:  Constitutional: non-toxic  HEAD/EYES: anicteric  ENT:  supple  Cardiovascular:   not tachycardic  Respiratory:  not tachypneic  :  no martinez  Musculoskeletal:  no synovitis, negative vipul b/l  Neurologic: awake and alert  Skin:  no rash, no erythema, no phlebitis  Psychiatric:  appropriate mood    WBC Count: 7.44 (04-26-20 @ 05:47)  WBC Count: 10.07 (04-25-20 @ 05:30)  WBC Count: 15.73 (04-24-20 @ 06:14)  WBC Count: 16.26 (04-23-20 @ 14:50)                        11.3   7.44  )-----------( 331      ( 26 Apr 2020 05:47 )             36.2 04-26    135  |  100  |  19  ----------------------------<  110  4.5   |  23  |  0.95  Ca    10.0      26 Apr 2020 05:47Mg     2.3     04-26  TPro  8.1  /  Alb  3.7  /  TBili  < 0.2  /  DBili  x   /  AST  11  /  ALT  36  /  AlkPhos  85  04-26    MICROBIOLOGY:  outpatient COVID19 (+)    Culture - Blood (collected 04-23-20 @ 18:18)  Source: .Blood Blood-Peripheral  Preliminary Report (04-24-20 @ 19:02):    No growth to date.    Culture - Blood (collected 04-23-20 @ 18:15)  Source: .Blood Blood-Peripheral  Preliminary Report (04-24-20 @ 19:02):    No growth to date.    Culture - Urine (collected 04-23-20 @ 14:17)  Source: .Urine Clean Catch (Midstream)  Final Report (04-25-20 @ 15:55):    >100,000 CFU/ml Escherichia coli ESBL      -  Amikacin: S <=16      -  Ampicillin: R >16 These ampicillin results predict results for amoxicillin      -  Ampicillin/Sulbactam: R >16/8 Enterobacter, Citrobacter, and Serratia may develop resistance during prolonged therapy (3-4 days)      -  Aztreonam: R >16      -  Cefazolin: R >16     -  Ciprofloxacin: R >2      -  Ertapenem: S <=1      -  Levofloxacin: R >4      -  Meropenem: S <=< 1      -  Nitrofurantoin: S <=32 Should not be used to treat pyelonephritis      -  Trimethoprim/Sulfamethoxazole: S <=2/38    RADIOLOGY:  CT Angio Chest w/ IV Cont (04.23.20 @ 17:42)   IMPRESSION:   Nondiagnostic study for pulmonary embolism secondary to poor opacification of the pulmonary arteries. If clinical concernpersists, the study may be repeated.  Peripheral reticular and groundglass opacities within the right upper lobe as well as the right lower lobe which are new from 2/14/2020. These may be secondary to radiation changes however underlying infection including COVID is not excluded. Worsening right hilar adenopathy and increasing pleural-based right density in the right midlung field. Recurrent disease is not excluded at these locations.  Lytic lesions within the anterior lateral aspects of the right fourth and fifth ribs concerning for metastatic disease.  Partially imaged low attenuating right adrenal gland lesion which is indeterminate.    CT Abdomen and Pelvis w/ Oral Cont and w/ IV Cont (02.14.20 @ 14:12) >  IMPRESSION:  Enlarged right hilar lymph node unchanged.  Apparent right middle lobectomy with postsurgical scarring.  Bilateral small indeterminant adrenal nodule is unchanged.

## 2020-04-27 NOTE — DISCHARGE NOTE PROVIDER - NSDCCPCAREPLAN_GEN_ALL_CORE_FT
PRINCIPAL DISCHARGE DIAGNOSIS  Diagnosis: UTI (urinary tract infection)  Assessment and Plan of Treatment:       SECONDARY DISCHARGE DIAGNOSES  Diagnosis: 2019 novel coronavirus disease (COVID-19)  Assessment and Plan of Treatment: PRINCIPAL DISCHARGE DIAGNOSIS  Diagnosis: UTI (urinary tract infection)  Assessment and Plan of Treatment:       SECONDARY DISCHARGE DIAGNOSES  Diagnosis: 2019 novel coronavirus disease (COVID-19)  Assessment and Plan of Treatment: You have been diagnosed with the COVID-19 virus during your hospital stay. You must self quarantine to complete a 14 day time period.  Monitor for fevers, shortness of breath and cough primarily.  Monitor your temperature daily to not any changes and increases.    It has been determined that you no longer need hospitalization and can recover while remaining in self-quarantine at home. You should follow the prevention steps below until a healthcare provider or local or state health department says you can return to your normal activities.  1. You should restrict activities outside your home, except for getting medical care.  2. Do not go to work, school, or public areas.  3. Avoid using public transportation, ride-sharing, or taxis.  4. Separate yourself from other people and animals in your home.  5. Call ahead before visiting your doctor.  6. Wear a facemask.  7. Cover your coughs and sneezes.  8. Clean your hands often.  9. Avoid sharing personal household items.  10. Clean all “high-touch” surfaces everyday.  11. Monitor your symptoms.  If you have a medical emergency and need to call 911, notify the dispatch personnel that you have COVID-19 If possible, put on a facemask before emergency medical services arrive.  12. Stopping home isolation.  Patients with confirmed COVID-19 should remain under home isolation precautions for 14 days since the positive COVID-19 test and until the risk of secondary transmission to others is thought to be low. The decision to discontinue home isolation precautions should be made on a case-by-case basis, in consultation with healthcare providers and state and local health departments. Your [facility] Department of Health can be reached at [phone] for further information about COVID-19. PRINCIPAL DISCHARGE DIAGNOSIS  Diagnosis: UTI (urinary tract infection)  Assessment and Plan of Treatment: finish antibiotics and then follow up with PCP      SECONDARY DISCHARGE DIAGNOSES  Diagnosis: 2019 novel coronavirus disease (COVID-19)  Assessment and Plan of Treatment: You have been diagnosed with the COVID-19 virus during your hospital stay. You must self quarantine to complete a 14 day time period.  Monitor for fevers, shortness of breath and cough primarily.  Monitor your temperature daily to not any changes and increases.    It has been determined that you no longer need hospitalization and can recover while remaining in self-quarantine at home. You should follow the prevention steps below until a healthcare provider or local or state health department says you can return to your normal activities.  1. You should restrict activities outside your home, except for getting medical care.  2. Do not go to work, school, or public areas.  3. Avoid using public transportation, ride-sharing, or taxis.  4. Separate yourself from other people and animals in your home.  5. Call ahead before visiting your doctor.  6. Wear a facemask.  7. Cover your coughs and sneezes.  8. Clean your hands often.  9. Avoid sharing personal household items.  10. Clean all “high-touch” surfaces everyday.  11. Monitor your symptoms.  If you have a medical emergency and need to call 911, notify the dispatch personnel that you have COVID-19 If possible, put on a facemask before emergency medical services arrive.  12. Stopping home isolation.  Patients with confirmed COVID-19 should remain under home isolation precautions for 14 days since the positive COVID-19 test and until the risk of secondary transmission to others is thought to be low. The decision to discontinue home isolation precautions should be made on a case-by-case basis, in consultation with healthcare providers and state and local health departments. Your [facility] Department of Health can be reached at [phone] for further information about COVID-19. PRINCIPAL DISCHARGE DIAGNOSIS  Diagnosis: UTI (urinary tract infection)  Assessment and Plan of Treatment: finish antibiotics and then follow up with PCP To help prevent future infections maintain healthy hygiene and avoid taking long baths. Wipe from front to back and empty your bladder frequently by keeping yourself properly hydrated. Monitor for signs/symptoms of infection, such as, fever/chills, burning/pain with urination, urinary frequency/hesitancy, cloudy urine, or blood in urine and follow-up with your primary care provider as outpatient for further care.      SECONDARY DISCHARGE DIAGNOSES  Diagnosis: 2019 novel coronavirus disease (COVID-19)  Assessment and Plan of Treatment: You have been diagnosed with the COVID-19 virus during your hospital stay. You must self quarantine to complete a 14 day time period.  Monitor for fevers, shortness of breath and cough primarily.  Monitor your temperature daily to not any changes and increases.    It has been determined that you no longer need hospitalization and can recover while remaining in self-quarantine at home. You should follow the prevention steps below until a healthcare provider or local or state health department says you can return to your normal activities.  1. You should restrict activities outside your home, except for getting medical care.  2. Do not go to work, school, or public areas.  3. Avoid using public transportation, ride-sharing, or taxis.  4. Separate yourself from other people and animals in your home.  5. Call ahead before visiting your doctor.  6. Wear a facemask.  7. Cover your coughs and sneezes.  8. Clean your hands often.  9. Avoid sharing personal household items.  10. Clean all “high-touch” surfaces everyday.  11. Monitor your symptoms.  If you have a medical emergency and need to call 911, notify the dispatch personnel that you have COVID-19 If possible, put on a facemask before emergency medical services arrive.  12. Stopping home isolation.  Patients with confirmed COVID-19 should remain under home isolation precautions for 14 days since the positive COVID-19 test and until the risk of secondary transmission to others is thought to be low. The decision to discontinue home isolation precautions should be made on a case-by-case basis, in consultation with healthcare providers and state and local health departments. Your [facility] Department of Health can be reached at [phone] for further information about COVID-19.    Diagnosis: Adenocarcinoma of right lung, stage 1  Assessment and Plan of Treatment: Adenocarcinoma of right lung, stage 1 follow up at [facility] in 1 week    Diagnosis: Hypertension, unspecified type  Assessment and Plan of Treatment: Hypertension, unspecified type Low sodium and fat diet, Continue blood pressure medication regimen as directed. Monitor for any visual changes, headaches or dizziness.  Monitor blood pressure regularly.  Follow up with your primary care provider for further management for high blood pressure.

## 2020-04-27 NOTE — PROGRESS NOTE ADULT - SUBJECTIVE AND OBJECTIVE BOX
Medicine Follow-up    INTERVAL HPI/OVERNIGHT EVENTS:  Pt denies any new complaints.  Denies CP, SOB, cough, malaise or any dysuria.    Review of Systems:  General: denies fevers/chills  Respiratory: as above  Neuro: denies weakness    VITAL SIGNS:  T(F): 97.8 (04-27-20 @ 05:16)  HR: 84 (04-27-20 @ 05:16)  BP: 116/78 (04-27-20 @ 05:16)  RR: 18 (04-27-20 @ 05:16)  SpO2: 98% (04-27-20 @ 05:16)  Wt(kg): --      PHYSICAL EXAM:    Constitutional: AAOx3  Eyes: EOMI, sclera non-icteric  Neck: supple, no JVD at 45  Respiratory: no labored breathing noted  Gastrointestinal: abd obese, soft, NTND, no masses palpable, no tenderness over upper pelvic brim   Extremities:  no c/c/e  MSK: no obvious abnormalities.   Neurological: moes all ext spontan  Psych: normal affect    MEDICATIONS  (STANDING):  enoxaparin Injectable 40 milliGRAM(s) SubCutaneous daily  ertapenem  IVPB 1000 milliGRAM(s) IV Intermittent every 24 hours  lisinopril 10 milliGRAM(s) Oral daily  metoprolol tartrate 12.5 milliGRAM(s) Oral two times a day  sodium chloride 0.9%. 1000 milliLiter(s) (60 mL/Hr) IV Continuous <Continuous>  tamsulosin 0.4 milliGRAM(s) Oral at bedtime    MEDICATIONS  (PRN):  acetaminophen   Tablet .. 650 milliGRAM(s) Oral every 8 hours PRN Temp greater or equal to 38C (100.4F), Mild Pain (1 - 3), Moderate Pain (4 - 6)      No Known Allergies      LABS:                        11.3   7.44  )-----------( 331      ( 26 Apr 2020 05:47 )             36.2     04-26    135  |  100  |  19  ----------------------------<  110<H>  4.5   |  23  |  0.95    Ca    10.0      26 Apr 2020 05:47  Mg     2.3     04-26    TPro  8.1  /  Alb  3.7  /  TBili  < 0.2<L>  /  DBili  x   /  AST  11  /  ALT  36  /  AlkPhos  85  04-26           RADIOLOGY & ADDITIONAL TESTS:  Studies reviewed.

## 2020-04-27 NOTE — PROGRESS NOTE ADULT - ASSESSMENT
· Assessment		  In summary, Mr. Enrique is a 64 year old [place] gentleman with an extensive medical history.  He has a history of hypertension, benign prostatic hypertrophy (BPH), and tobacco abuse.  In addition, he has a history of mycosis fungoides (diagnosed in 2015), for which he underwent 7 cycles of CHOP (in the [facility]).  He was also diagnosed with stage I adenocarcinoma of the right lung in May 2019 and underwent a surgical resection (details not currently available) in the [facility].  The surgical lobectomy was followed by adjuvant therapy with one cycle of cisplatin/Taxol (also in [facility]).  Mr. Enrique was evaluated in Thoracic Surgery in February 2020 for recurrent lung cancer and underwent flexible bronchoscopy and an endobronchial ultrasound (EBUS) guided hilar lymph node biopsy in March 2020.  In summary, the biopsy demonstrated evidence of non-small cell carcinoma.  However, the biopsy quantity was deemed insufficient for PDL-testing or foundation one testing.  The plan has been for Mr. Enrique to undergo liquid biopsy.    In the meantime, Mr. Enrique has had a 2-3 month history of intermittent fevers.  The cause of the fevers remained uncertain in [facility], and Mr. Enrique and his wife recently travelled to the [place] for further evaluation (their daughter Kamini is a physician's assistant in [place]).  After arriving in the [place], Mr. Enrique continued to experience intermittent fever in addition to chills and a chronic cough.  He has maintained a good appetite and denies having any weight loss.  Of note is that Mr. Enrique was admitted to [facility] in March 2020 with a urinary tract infection.  Urine culture at that time grew E. coli ESBL (extended spectrum beta lactamase) and he was treated with ertapenem.  Of note is that Mr. Enrique tested positive for COVID-19 following a telemedicine Oncology evaluation, which was performed on April 7th, 2020.    Since being admitted to [facility], Mr. Enrique has had a chest x-ray demonstrating reticular interstitial lung markings bilaterally (most pronounced in the lower lobes) which may represent viral pneumonia.  A CT angiogram (performed 4/23/20) was nondiagnostic for pulmonary embolism due to poor opacification of the pulmonary arteries.  Other findings included peripheral reticular and ground glass opacities within the right upper lobe as well as the right lower lobe which are new from 2/14/2020.  These may be secondary to radiation changes, however underlying infection including COVID-19 is not excluded.  There was also worsening right hilar lymphadenopathy and increasing pleural-based right density in the right midlung field.  Recurrent disease is not excluded at these locations.  In addition, there were lytic lesions within the anterior lateral aspects of the right fourth and fifth ribs concerning for metastatic disease.    A urinalysis performed upon admission demonstrated moderate blood, positive nitrite, moderate leukocyte esterase, > 50 WBC/HPF, and many bacteria.  Mr. Enrique was empirically started on ertapenem pending the results of urine and blood cultures.      Problem/Plan - 1:  ·  Problem: 2019 novel coronavirus disease (COVID-19).  Plan: Patient recently diagnosed with COVID-19 as outpt on 4/7/20..  His admission chest-ray is consistent with probable viral pneumonia due to SARS-CoV-2.  Currently his SpO2 on room air is 98% and he is asymptomatic.  - will continue to monitor SpO2 on room air; supplemental oxygen as necessary to maintain SpO2 > 92%.  - no indication for adjunctive therapy for COVID-19 at this time.  - CT angiogram yesterday (4/23) was nondiagnostic for pulmonary embolism due to poor opacification of the pulmonary arteries.  Currently there is a low clinical suspicion for pulmonary embolus (no tachycardia, good room air SpO2, no symptoms), therefore will continue to observe conservatively.  If any changes in his symptoms or SpO2 will consider repeating the CT angiogram.      Problem/Plan - 2:  ·  Problem: Urinary tract infection with hematuria, site unspecified.  Plan: Urine culture growing > 100K CFU/mL of gram negative rods; likely to be E. coli however speciation and antibiotic sensitivities pending.  - appreciate input from Urology and Infectious Diseases  - will obtain CT urogram to evaluate for underlying infection/abscess  - check post-void residuals after each void to evaluate bladder emptying       Problem/Plan - 3:  ·  Problem: Adenocarcinoma of right lung, stage 1.  Plan: Plan had been for patient to undergo liquid biopsy following EBUS-guided FNA in March 2020 which demonstrated non-small cell carcinoma.  However, FNA quantity was insufficient for PDL testing or foundation one testing  - appreciate input from Oncology and further F/U as outpt  - CT surgery recs appreciated - no interventions recommended  - COVID and infection management as per primary team  - await path Foundation One results  - outpt f/u at [facility].      Problem/Plan - 4:  ·  Problem: Hypertension, unspecified type.  Plan: Continue lisinopril and metoprolol with good effect     Problem/Plan - 5:  ·  Problem: History of BPH.  Plan: Continue tamsulosin.

## 2020-04-27 NOTE — DISCHARGE NOTE PROVIDER - NSFOLLOWUPCLINICS_GEN_ALL_ED_FT
[facility]  Hematology/Oncology  [address] [id number]  Phone: [phone]  Fax:   Follow Up Time: 1 week

## 2020-04-27 NOTE — PROGRESS NOTE ADULT - ASSESSMENT
64M [place] gentleman with HTN, BPH, ex-smoker, hx Mycosis fungoides (diagnosed in 2015 - s/p CHOP), recent stage I adenoca R lung s/p resection (5/2019) and adjuvant cisplatin/Taxol.  Recent hospitalization for ESBL UTI s/p ertapenem.  Returns with continued fevers.  Again, started on ertapenem with resolution of leukocytosis and fever.  Being followed by urology - ordered CT urogram.  Work up do date: CT chest with possible mets to his ribs.    DDx of his fevers are potentially multifactorial and may not be related  to UTI as he does not have any urinary symptoms. His fevers might be explained by malignancy vs pulmonary embolus vs COVID19. He is at high risk for DVT/PE given his malignancy and COVID19 seems to make patients at higher risk for clots. He is on RA and breathing comfortably but occasionally tachycardic.   He has been afebrile since 4/24 and he reports feeling much better     Overall, 64M recurrent fevers, ESBL ecoli UTI, COVID19, leukocytosis    ESBL UTI  - continue Invanz (Ertapenem) 1g q24hrs  - f/u CT urogram   - please perform rectal exam to evaluate for prostate abscess    Leukocytosis  - trend wbc    fever  - monitor fever curve  - if recurs, check echo  - dopplers legs

## 2020-04-28 LAB
CULTURE RESULTS: SIGNIFICANT CHANGE UP
CULTURE RESULTS: SIGNIFICANT CHANGE UP
SPECIMEN SOURCE: SIGNIFICANT CHANGE UP
SPECIMEN SOURCE: SIGNIFICANT CHANGE UP

## 2020-04-28 PROCEDURE — 99233 SBSQ HOSP IP/OBS HIGH 50: CPT

## 2020-04-28 PROCEDURE — 99232 SBSQ HOSP IP/OBS MODERATE 35: CPT

## 2020-04-28 RX ADMIN — ERTAPENEM SODIUM 120 MILLIGRAM(S): 1 INJECTION, POWDER, LYOPHILIZED, FOR SOLUTION INTRAMUSCULAR; INTRAVENOUS at 17:16

## 2020-04-28 RX ADMIN — ENOXAPARIN SODIUM 40 MILLIGRAM(S): 100 INJECTION SUBCUTANEOUS at 12:15

## 2020-04-28 RX ADMIN — TAMSULOSIN HYDROCHLORIDE 0.4 MILLIGRAM(S): 0.4 CAPSULE ORAL at 21:14

## 2020-04-28 RX ADMIN — LISINOPRIL 10 MILLIGRAM(S): 2.5 TABLET ORAL at 05:56

## 2020-04-28 NOTE — PROGRESS NOTE ADULT - SUBJECTIVE AND OBJECTIVE BOX
Patient is a 64y old  Male who presents with a chief complaint of Fevers x 3 weeks (25 Apr 2020 17:04)    f/u esbl uti    Interval History/ROS:  CT results noted. no fever    PAST MEDICAL & SURGICAL HISTORY:  History of BPH  Hypertension  Mycosis fungoides  Stage I adenocarcinoma of lung, unspecified laterality: s/p lung resection R lung, s/p CHOP, cisplatin  Ganglion or synovial cyst of knee: excision from right knee  S/P lobectomy of lung    Allergies  No Known Allergies    ANTIMICROBIALS:    Ertapenem  IVPB 1000 every 24 hours (4/23-)    MEDICATIONS  (STANDING):  enoxaparin Injectable 40 daily  lisinopril 10 daily  metoprolol tartrate 12.5 two times a day  tamsulosin 0.4 at bedtime  PRN  acetaminophen   Tablet .. 650 milliGRAM(s) Oral every 8 hours PRN    Vital Signs Last 24 Hrs  T(F): 98.1 (04-28-20 @ 14:33), Max: 98.1 (04-28-20 @ 14:33)  HR: 82 (04-28-20 @ 14:33)  BP: 119/74 (04-28-20 @ 14:33)  RR: 18 (04-28-20 @ 14:33)  SpO2: 98% (04-28-20 @ 14:33) (98% - 98%)    PHYSICAL EXAM:  exam by primary team  deferred secondary to availability of PPE and to limit exposures due to COVID19    MICROBIOLOGY:  outpatient COVID19 (+)    Culture - Blood (collected 04-23-20 @ 18:18)  Source: .Blood Blood-Peripheral  Preliminary Report (04-24-20 @ 19:02):    No growth to date.    Culture - Blood (collected 04-23-20 @ 18:15)  Source: .Blood Blood-Peripheral  Preliminary Report (04-24-20 @ 19:02):    No growth to date.    Culture - Urine (collected 04-23-20 @ 14:17)  Source: .Urine Clean Catch (Midstream)  Final Report (04-25-20 @ 15:55):    >100,000 CFU/ml Escherichia coli ESBL      -  Amikacin: S <=16      -  Ampicillin: R >16 These ampicillin results predict results for amoxicillin      -  Ampicillin/Sulbactam: R >16/8 Enterobacter, Citrobacter, and Serratia may develop resistance during prolonged therapy (3-4 days)      -  Aztreonam: R >16      -  Cefazolin: R >16     -  Ciprofloxacin: R >2      -  Ertapenem: S <=1      -  Levofloxacin: R >4      -  Meropenem: S <=< 1      -  Nitrofurantoin: S <=32 Should not be used to treat pyelonephritis      -  Trimethoprim/Sulfamethoxazole: S <=2/38    RADIOLOGY:  CT Abdomen and Pelvis w/wo IV Cont (04.27.20 @ 15:21) >  IMPRESSION:   Right-sided pyelonephritis.  No abscess or hydronephrosis.      VA Duplex Ext Veins Lower Comp, Bilat. (04.27.20 @ 12:04) >  Summary/Impressions:  No evidence of deep vein thrombosis in the right and left lower extremities.    CT Angio Chest w/ IV Cont (04.23.20 @ 17:42)   IMPRESSION:   Nondiagnostic study for pulmonary embolism secondary to poor opacification of the pulmonary arteries. If clinical concernpersists, the study may be repeated.  Peripheral reticular and groundglass opacities within the right upper lobe as well as the right lower lobe which are new from 2/14/2020. These may be secondary to radiation changes however underlying infection including COVID is not excluded. Worsening right hilar adenopathy and increasing pleural-based right density in the right midlung field. Recurrent disease is not excluded at these locations.  Lytic lesions within the anterior lateral aspects of the right fourth and fifth ribs concerning for metastatic disease.  Partially imaged low attenuating right adrenal gland lesion which is indeterminate.    CT Abdomen and Pelvis w/ Oral Cont and w/ IV Cont (02.14.20 @ 14:12) >  IMPRESSION:  Enlarged right hilar lymph node unchanged.  Apparent right middle lobectomy with postsurgical scarring.  Bilateral small indeterminant adrenal nodule is unchanged.

## 2020-04-28 NOTE — PROGRESS NOTE ADULT - SUBJECTIVE AND OBJECTIVE BOX
Medicine Follow-up    INTERVAL HPI/OVERNIGHT EVENTS:  Sitting in bed comfortably and denies all complaints. Reports feeling well. No , cough, SOB, chest discomfort, fever, chills body aches or dysuria or urinary hesitancy.      Review of Systems:  General: as above  Respiratory: as above    VITAL SIGNS:  T(F): 98 (04-28-20 @ 05:52)  HR: 86 (04-28-20 @ 05:52)  BP: 107/78 (04-28-20 @ 05:52)  RR: 17 (04-28-20 @ 05:52)  SpO2: 98% (04-28-20 @ 05:52)  Wt(kg): --      PHYSICAL EXAM:    Constitutional: NAD   Eyes: EOMI, sclera non-icteric  Respiratory: no labored breathing   Gastrointestinal: obese abd, soft, NT  Extremities:  no BLE edema / swelling  Neurological: moves all ext spont  Psych: normal affect    MEDICATIONS  (STANDING):  enoxaparin Injectable 40 milliGRAM(s) SubCutaneous daily  ertapenem  IVPB 1000 milliGRAM(s) IV Intermittent every 24 hours  lisinopril 10 milliGRAM(s) Oral daily  metoprolol tartrate 12.5 milliGRAM(s) Oral two times a day  sodium chloride 0.9%. 1000 milliLiter(s) (60 mL/Hr) IV Continuous <Continuous>  tamsulosin 0.4 milliGRAM(s) Oral at bedtime    MEDICATIONS  (PRN):  acetaminophen   Tablet .. 650 milliGRAM(s) Oral every 8 hours PRN Temp greater or equal to 38C (100.4F), Mild Pain (1 - 3), Moderate Pain (4 - 6)      No Known Allergies      LABS:                 RADIOLOGY & ADDITIONAL TESTS:  CT abd / pelvis 4/28/20    INTERPRETATION:  CLINICAL INFORMATION: Rule out abscess. Urinary tract infection. Non-Hodgkin's lymphoma in 2015, status post chemotherapy. History of adenocarcinoma of lung.    COMPARISON: February 14, 2020    PROCEDURE:   CT of the Abdomen and Pelvis was performed with and without intravenous contrast.   Precontrast, Nephrographic and Excretory phases were acquired.  10 mg of Lasix was administered.  Intravenous contrast: 96 ml Omnipaque 350. 4 ml discarded.  Oral contrast: None.  Sagittal and coronal reformats were performed.    FINDINGS:    LOWER CHEST: Within normal limits.    LIVER: Small left hepatic lobe cyst.  BILE DUCTS: Normal caliber.  GALLBLADDER: Within normal limits.  SPLEEN: Within normal limits.  PANCREAS: Within normal limits.  ADRENALS: 0.6 x 1.3 cm left adrenal adenoma.  KIDNEYS/URETERS: Heterogeneous right renal parenchymal enhancement. No hydronephrosis or stone. Well-opacified collecting systems and ureters without filling defect or dilatation. No renal or perirenal abscess.    BLADDER: No stone, mass or wall thickening.  REPRODUCTIVE ORGANS: Prostate is enlarged.    BOWEL: No bowel obstruction. Appendix normal.  PERITONEUM: No ascites.  VESSELS: Atherosclerotic changes.  RETROPERITONEUM/LYMPH NODES: No lymphadenopathy.    ABDOMINAL WALL: Within normal limits.  BONES: Within normal limits.    IMPRESSION:     Right-sided pyelonephritis.  No abscess or hydronephrosis

## 2020-04-28 NOTE — PROGRESS NOTE ADULT - ASSESSMENT
64M [place] gentleman with HTN, BPH, ex-smoker, hx Mycosis fungoides (diagnosed in 2015 - s/p CHOP), recent stage I adenoca R lung s/p resection (5/2019) and adjuvant cisplatin/Taxol.  Recent hospitalization for ESBL UTI s/p ertapenem.  Returns with continued fevers.  Again, started on ertapenem with resolution of leukocytosis and fever.  Repeat imaging suggests R pyelonephritis without evidence of abscess.  Today is D#6 of IV antibiotics    Overall, 64M recurrent fevers, ESBL ecoli UTI/pyelonephritis, COVID19, leukocytosis    ESBL UTI  - continue Invanz (Ertapenem) 1g q24hrs  - after tomorrow's dose, can change to fosfomycin 3g po q48 for 3 doses    Leukocytosis  - trend wbc    fever  - monitor fever curve  - if recurs, check echo    d/c planning

## 2020-04-28 NOTE — PROGRESS NOTE ADULT - ASSESSMENT
Problem/Plan - 1:  ·  Problem: 2019 novel coronavirus disease (COVID-19).  Plan: Patient recently diagnosed with COVID-19 as outpt on 4/7/20..  His admission chest-ray is consistent with probable viral pneumonia due to SARS-CoV-2.  Currently his SpO2 on room air is 98% and he remains asymptomatic.  - will continue to monitor SpO2 on room air; supplemental oxygen as necessary to maintain SpO2 > 92%.  - no indication for adjunctive therapy for COVID-19 at this time.  - CT angiogram  (4/23) was nondiagnostic for pulmonary embolism due to poor opacification of the pulmonary arteries.  Currently there is a low clinical suspicion for pulmonary embolus (no tachycardia, good room air SpO2, no symptoms), therefore will continue to observe conservatively.  If any changes in his symptoms or SpO2 will consider repeating the CT angiogram.      Problem/Plan - 2:  ·  Problem: Urinary tract infection with hematuria, site unspecified.  Plan: Urine culture growing > 100K CFU/mL of gram negative rods;  E. coli ESBL (extended spectrum beta lactamase) and treated with ertapenem.     - appreciate input from Urology and Infectious Diseases  - CT abd / pelvis 4/28/20 c/w R pyelonephritis / no obstructive uropathy      Problem/Plan - 3:  ·  Problem: Adenocarcinoma of right lung, stage 1.  Plan: Plan had been for patient to undergo liquid biopsy following EBUS-guided FNA in March 2020 which demonstrated non-small cell carcinoma.  However, FNA quantity was insufficient for PDL testing or foundation one testing  - appreciate input from Oncology and further F/U as outpt  - CT surgery recs appreciated - no interventions recommended  - COVID and infection management as per primary team  - await path Foundation One results  - outpt f/u at [facility].      Problem/Plan - 4:  ·  Problem: Hypertension, unspecified type.  Plan: Continue lisinopril and metoprolol with good effect     Problem/Plan - 5:  ·  Problem: History of BPH.  Plan: Continue tamsulosin.

## 2020-04-28 NOTE — PROGRESS NOTE ADULT - ASSESSMENT
64 year old male with a history of BPH, HTN, mycosis fungoides s/p 7 cycles of CHOP in rain 2015, stage 1 lung adenocarcinoma s/p 1 cycle of Cisplatin/Taxol in [facility] and most recently diagnosed with metastatic NSCLC 2/2020, and a year of low grade fevers, recent admission for ESBL Ecoli UTI, now readmitted with persistent fevers and urine culture positive for GNR, also recent COVID-19 infection.     - PVR < 50mL  - CT scan without signs of stone or abscess  - Appreciate ID recs regarding antibiotics  - After completion of abx course for UTI, please start methenamine 1g BID with Vitamin C 1000mg/day.  - Please reach out with any questions    Delon Jennings  [phone] 64 year old male with a history of BPH, HTN, mycosis fungoides s/p 7 cycles of CHOP in rain 2015, stage 1 lung adenocarcinoma s/p 1 cycle of Cisplatin/Taxol in [facility], and most recently diagnosed with metastatic NSCLC 2/2020, and a year of low grade fevers, recent admission for ESBL Ecoli UTI, now readmitted with persistent fevers and urine culture positive for GNR, also recent COVID-19 infection.     - PVR < 50mL  - CT scan without signs of stone or abscess --> R pyelo   - Appreciate ID recs regarding antibiotics  - After completion of abx course for UTI, please start methenamine 1g BID with Vitamin C 1000mg/day.  - Please reach out with any questions    Delon Jennings  [phone]

## 2020-04-28 NOTE — PROGRESS NOTE ADULT - SUBJECTIVE AND OBJECTIVE BOX
Interval Events:    CT scan yesterday demonstrate R sided pyelo without signs of abscess. No fevers overnight.  Feels well.    O: Vital Signs Last 24 Hrs  T(C): 36.7 (28 Apr 2020 05:52), Max: 36.9 (27 Apr 2020 13:45)  T(F): 98 (28 Apr 2020 05:52), Max: 98.4 (27 Apr 2020 13:45)  HR: 86 (28 Apr 2020 05:52) (83 - 86)  BP: 107/78 (28 Apr 2020 05:52) (107/78 - 116/69)  BP(mean): --  RR: 17 (28 Apr 2020 05:52) (17 - 17)  SpO2: 98% (28 Apr 2020 05:52) (98% - 98%)        Physical Exam:    General: Awake and Alert in no acute distress  Respiratory and Thorax: no resp distress   Cardiovascular: Regular  Gastrointestinal: soft, non tender, no CVAT  Genitourinary: glans uncircumcised, no lesions, no pain, no drainage.  No scrotal or testicular tenderness.   Labs:            CAPILLARY BLOOD GLUCOSE                    MEDICATIONS  (STANDING):  enoxaparin Injectable 40 milliGRAM(s) SubCutaneous daily  ertapenem  IVPB 1000 milliGRAM(s) IV Intermittent every 24 hours  lisinopril 10 milliGRAM(s) Oral daily  metoprolol tartrate 12.5 milliGRAM(s) Oral two times a day  sodium chloride 0.9%. 1000 milliLiter(s) (60 mL/Hr) IV Continuous <Continuous>  tamsulosin 0.4 milliGRAM(s) Oral at bedtime    MEDICATIONS  (PRN):  acetaminophen   Tablet .. 650 milliGRAM(s) Oral every 8 hours PRN Temp greater or equal to 38C (100.4F), Mild Pain (1 - 3), Moderate Pain (4 - 6)

## 2020-04-29 ENCOUNTER — TRANSCRIPTION ENCOUNTER (OUTPATIENT)
Age: 65
End: 2020-04-29

## 2020-04-29 LAB
ANION GAP SERPL CALC-SCNC: 13 MMO/L — SIGNIFICANT CHANGE UP (ref 7–14)
BUN SERPL-MCNC: 18 MG/DL — SIGNIFICANT CHANGE UP (ref 7–23)
CALCIUM SERPL-MCNC: 9.7 MG/DL — SIGNIFICANT CHANGE UP (ref 8.4–10.5)
CHLORIDE SERPL-SCNC: 99 MMOL/L — SIGNIFICANT CHANGE UP (ref 98–107)
CO2 SERPL-SCNC: 24 MMOL/L — SIGNIFICANT CHANGE UP (ref 22–31)
CREAT SERPL-MCNC: 0.97 MG/DL — SIGNIFICANT CHANGE UP (ref 0.5–1.3)
GLUCOSE SERPL-MCNC: 100 MG/DL — HIGH (ref 70–99)
HCT VFR BLD CALC: 38.9 % — LOW (ref 39–50)
HGB BLD-MCNC: 12 G/DL — LOW (ref 13–17)
MAGNESIUM SERPL-MCNC: 2.3 MG/DL — SIGNIFICANT CHANGE UP (ref 1.6–2.6)
MCHC RBC-ENTMCNC: 25.2 PG — LOW (ref 27–34)
MCHC RBC-ENTMCNC: 30.8 % — LOW (ref 32–36)
MCV RBC AUTO: 81.6 FL — SIGNIFICANT CHANGE UP (ref 80–100)
NRBC # FLD: 0 K/UL — SIGNIFICANT CHANGE UP (ref 0–0)
PHOSPHATE SERPL-MCNC: 2.9 MG/DL — SIGNIFICANT CHANGE UP (ref 2.5–4.5)
PLATELET # BLD AUTO: 344 K/UL — SIGNIFICANT CHANGE UP (ref 150–400)
PMV BLD: 9 FL — SIGNIFICANT CHANGE UP (ref 7–13)
POTASSIUM SERPL-MCNC: 4.9 MMOL/L — SIGNIFICANT CHANGE UP (ref 3.5–5.3)
POTASSIUM SERPL-SCNC: 4.9 MMOL/L — SIGNIFICANT CHANGE UP (ref 3.5–5.3)
RBC # BLD: 4.77 M/UL — SIGNIFICANT CHANGE UP (ref 4.2–5.8)
RBC # FLD: 16.9 % — HIGH (ref 10.3–14.5)
SODIUM SERPL-SCNC: 136 MMOL/L — SIGNIFICANT CHANGE UP (ref 135–145)
WBC # BLD: 8.88 K/UL — SIGNIFICANT CHANGE UP (ref 3.8–10.5)
WBC # FLD AUTO: 8.88 K/UL — SIGNIFICANT CHANGE UP (ref 3.8–10.5)

## 2020-04-29 PROCEDURE — 99233 SBSQ HOSP IP/OBS HIGH 50: CPT

## 2020-04-29 PROCEDURE — 99232 SBSQ HOSP IP/OBS MODERATE 35: CPT

## 2020-04-29 RX ORDER — FOSFOMYCIN TROMETHAMINE 3 G/1
1 POWDER ORAL
Qty: 3 | Refills: 0
Start: 2020-04-29 | End: 2020-05-01

## 2020-04-29 RX ORDER — ACETAMINOPHEN 500 MG
2 TABLET ORAL
Qty: 0 | Refills: 0 | DISCHARGE
Start: 2020-04-29

## 2020-04-29 RX ADMIN — TAMSULOSIN HYDROCHLORIDE 0.4 MILLIGRAM(S): 0.4 CAPSULE ORAL at 21:09

## 2020-04-29 RX ADMIN — ENOXAPARIN SODIUM 40 MILLIGRAM(S): 100 INJECTION SUBCUTANEOUS at 12:06

## 2020-04-29 RX ADMIN — LISINOPRIL 10 MILLIGRAM(S): 2.5 TABLET ORAL at 04:30

## 2020-04-29 RX ADMIN — ERTAPENEM SODIUM 120 MILLIGRAM(S): 1 INJECTION, POWDER, LYOPHILIZED, FOR SOLUTION INTRAMUSCULAR; INTRAVENOUS at 18:14

## 2020-04-29 NOTE — PROGRESS NOTE ADULT - SUBJECTIVE AND OBJECTIVE BOX
Patient is a 64y old  Male who presents with a chief complaint of Fevers x 3 weeks (25 Apr 2020 17:04)    f/u esbl uti    Interval History/ROS:  CT results noted. no fever.  no complaints    PAST MEDICAL & SURGICAL HISTORY:  History of BPH  Hypertension  Mycosis fungoides  Stage I adenocarcinoma of lung, unspecified laterality: s/p lung resection R lung, s/p CHOP, cisplatin  Ganglion or synovial cyst of knee: excision from right knee  S/P lobectomy of lung    Allergies  No Known Allergies    ANTIMICROBIALS:    Ertapenem  IVPB 1000 every 24 hours (4/23-)    MEDICATIONS  (STANDING):  enoxaparin Injectable 40 daily  lisinopril 10 daily  metoprolol tartrate 12.5 two times a day  tamsulosin 0.4 at bedtime  PRN  acetaminophen   Tablet .. 650 milliGRAM(s) Oral every 8 hours PRN    Vital Signs Last 24 Hrs  T(F): 98.1 (04-28-20 @ 14:33), Max: 98.1 (04-28-20 @ 14:33)  HR: 82 (04-28-20 @ 14:33)  BP: 119/74 (04-28-20 @ 14:33)  RR: 18 (04-28-20 @ 14:33)  SpO2: 98% (04-28-20 @ 14:33) (98% - 98%)    PHYSICAL EXAM:  Constitutional: non-toxic, lying in bed  HEAD/EYES: anicteric, no conjunctival injection  ENT:  supple  Cardiovascular:  not tachycardic  Respiratory:  not tachypneic  :  no martinez  Musculoskeletal:  no synovitis  Neurologic: awake and alert  Skin:  no rash  Psychiatric:  awake, alert, appropriate mood    MICROBIOLOGY:  outpatient COVID19 (+)    Culture - Blood (collected 04-23-20 @ 18:18)  Source: .Blood Blood-Peripheral  Preliminary Report (04-24-20 @ 19:02):    No growth to date.    Culture - Blood (collected 04-23-20 @ 18:15)  Source: .Blood Blood-Peripheral  Preliminary Report (04-24-20 @ 19:02):    No growth to date.    Culture - Urine (collected 04-23-20 @ 14:17)  Source: .Urine Clean Catch (Midstream)  Final Report (04-25-20 @ 15:55):    >100,000 CFU/ml Escherichia coli ESBL      -  Amikacin: S <=16      -  Ampicillin: R >16 These ampicillin results predict results for amoxicillin      -  Ampicillin/Sulbactam: R >16/8 Enterobacter, Citrobacter, and Serratia may develop resistance during prolonged therapy (3-4 days)      -  Aztreonam: R >16      -  Cefazolin: R >16     -  Ciprofloxacin: R >2      -  Ertapenem: S <=1      -  Levofloxacin: R >4      -  Meropenem: S <=< 1      -  Nitrofurantoin: S <=32 Should not be used to treat pyelonephritis      -  Trimethoprim/Sulfamethoxazole: S <=2/38    RADIOLOGY:  CT Abdomen and Pelvis w/wo IV Cont (04.27.20 @ 15:21) >  IMPRESSION:   Right-sided pyelonephritis.  No abscess or hydronephrosis.      VA Duplex Ext Veins Lower Comp, Bilat. (04.27.20 @ 12:04) >  Summary/Impressions:  No evidence of deep vein thrombosis in the right and left lower extremities.    CT Angio Chest w/ IV Cont (04.23.20 @ 17:42)   IMPRESSION:   Nondiagnostic study for pulmonary embolism secondary to poor opacification of the pulmonary arteries. If clinical concernpersists, the study may be repeated.  Peripheral reticular and groundglass opacities within the right upper lobe as well as the right lower lobe which are new from 2/14/2020. These may be secondary to radiation changes however underlying infection including COVID is not excluded. Worsening right hilar adenopathy and increasing pleural-based right density in the right midlung field. Recurrent disease is not excluded at these locations.  Lytic lesions within the anterior lateral aspects of the right fourth and fifth ribs concerning for metastatic disease.  Partially imaged low attenuating right adrenal gland lesion which is indeterminate.    CT Abdomen and Pelvis w/ Oral Cont and w/ IV Cont (02.14.20 @ 14:12) >  IMPRESSION:  Enlarged right hilar lymph node unchanged.  Apparent right middle lobectomy with postsurgical scarring.  Bilateral small indeterminant adrenal nodule is unchanged.

## 2020-04-29 NOTE — PROGRESS NOTE ADULT - ASSESSMENT
Assessment and Plan:   · Assessment		   Problem/Plan - 1:  ·  Problem: 2019 novel coronavirus disease (COVID-19).  Plan: Patient recently diagnosed with COVID-19 as outpt on 4/7/20..  His admission chest-ray is consistent with probable viral pneumonia due to SARS-CoV-2.  Currently his SpO2 on room air is 98% and he remains asymptomatic.  - will continue to monitor SpO2 on room air; supplemental oxygen as necessary to maintain SpO2 > 92%.  - no indication for adjunctive therapy for COVID-19 at this time.  - CT angiogram  (4/23) was nondiagnostic for pulmonary embolism due to poor opacification of the pulmonary arteries.  Currently there is a low clinical suspicion for pulmonary embolus (no tachycardia, good room air SpO2, no symptoms), therefore will continue to observe conservatively.  If any changes in his symptoms or SpO2 will consider repeating the CT angiogram.      Problem/Plan - 2:  ·  Problem: Urinary tract infection with hematuria, site unspecified.  Plan: Urine culture growing > 100K CFU/mL of gram negative rods;  E. coli ESBL (extended spectrum beta lactamase) and treated with ertapenem.     - appreciate input from Urology and Infectious Diseases  - CT abd / pelvis 4/28/20 c/w R pyelonephritis / no obstructive uropathy   - ID f/u recommendations noted and appreciated on 10/28/20 - to continue with last ertrapenem 10/29/20 then switch to fosfomycin 3g po q48 for 3 doses     Problem/Plan - 3:  ·  Problem: Adenocarcinoma of right lung, stage 1.  Plan: Plan had been for patient to undergo liquid biopsy following EBUS-guided FNA in March 2020 which demonstrated non-small cell carcinoma.  However, FNA quantity was insufficient for PDL testing or foundation one testing  - appreciate input from Oncology and further F/U as outpt  - CT surgery recs appreciated - no interventions recommended  - COVID and infection management as per primary team  - await path Foundation One results  - outpt f/u at [facility].      Problem/Plan - 4:  ·  Problem: Hypertension, unspecified type.  Plan: Continue lisinopril and metoprolol with good effect     Problem/Plan - 5:  ·  Problem: History of BPH.  Plan: Continue tamsulosin.

## 2020-04-29 NOTE — PROGRESS NOTE ADULT - ASSESSMENT
64M [place] gentleman with HTN, BPH, ex-smoker, hx Mycosis fungoides (diagnosed in 2015 - s/p CHOP), recent stage I adenoca R lung s/p resection (5/2019) and adjuvant cisplatin/Taxol.  Recent hospitalization for ESBL UTI s/p ertapenem.  Returns with continued fevers.  Again, started on ertapenem with resolution of leukocytosis and fever.  Repeat imaging suggests R pyelonephritis without evidence of abscess.  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  Recent hospitalization for ESBL UTI s/p ertapenem.  Returns with continued fevers.  Again, started on ertapenem with resolution of leukocytosis and fever.  Repeat imaging suggests R pyelonephritis without evidence of abscess.  Today is D#6 of IV antibiotics    Overall, 64M recurrent fevers, ESBL ecoli UTI/pyelonephritis, COVID19, leukocytosis    ESBL UTI  - s/p 7 days Ertapenem 1g q24hrs  - can be discharged on fosfomycin 3g po q48 for 3 doses (start this tomorrow)    Leukocytosis  - trend wbc    fever  - monitor fever curve    ok for discharge from ID standpoint    I have discussed plan of care as detailed above with PA [id number]    I will be away 4/30, returning 5/1.  please call ID as needed [phone].

## 2020-04-29 NOTE — DISCHARGE NOTE NURSING/CASE MANAGEMENT/SOCIAL WORK - PATIENT PORTAL LINK FT
You can access the FollowMyHealth Patient Portal offered by  by registering at the following website: [email or web address]. By joining [facility]’s FollowMyHealth portal, you will also be able to view your health information using other applications (apps) compatible with our system.

## 2020-04-30 VITALS
TEMPERATURE: 98 F | RESPIRATION RATE: 18 BRPM | DIASTOLIC BLOOD PRESSURE: 68 MMHG | HEART RATE: 86 BPM | OXYGEN SATURATION: 96 % | SYSTOLIC BLOOD PRESSURE: 109 MMHG

## 2020-04-30 PROCEDURE — 99233 SBSQ HOSP IP/OBS HIGH 50: CPT

## 2020-04-30 RX ORDER — ERTAPENEM SODIUM 1 G/1
1 INJECTION, POWDER, LYOPHILIZED, FOR SOLUTION INTRAMUSCULAR; INTRAVENOUS
Qty: 3 | Refills: 0
Start: 2020-04-30 | End: 2020-05-02

## 2020-04-30 RX ADMIN — Medication 12.5 MILLIGRAM(S): at 04:14

## 2020-04-30 RX ADMIN — ERTAPENEM SODIUM 120 MILLIGRAM(S): 1 INJECTION, POWDER, LYOPHILIZED, FOR SOLUTION INTRAMUSCULAR; INTRAVENOUS at 16:54

## 2020-04-30 RX ADMIN — ENOXAPARIN SODIUM 40 MILLIGRAM(S): 100 INJECTION SUBCUTANEOUS at 15:12

## 2020-04-30 RX ADMIN — LISINOPRIL 10 MILLIGRAM(S): 2.5 TABLET ORAL at 04:14

## 2020-04-30 NOTE — PROGRESS NOTE ADULT - REASON FOR ADMISSION
Fevers x 3 weeks

## 2020-04-30 NOTE — DIETITIAN INITIAL EVALUATION ADULT. - PERTINENT MEDS FT
MEDICATIONS  (STANDING):  enoxaparin Injectable 40 milliGRAM(s) SubCutaneous daily  ertapenem  IVPB 1000 milliGRAM(s) IV Intermittent every 24 hours  lisinopril 10 milliGRAM(s) Oral daily  metoprolol tartrate 12.5 milliGRAM(s) Oral two times a day  sodium chloride 0.9%. 1000 milliLiter(s) (60 mL/Hr) IV Continuous <Continuous>  tamsulosin 0.4 milliGRAM(s) Oral at bedtime    MEDICATIONS  (PRN):  acetaminophen   Tablet .. 650 milliGRAM(s) Oral every 8 hours PRN Temp greater or equal to 38C (100.4F), Mild Pain (1 - 3), Moderate Pain (4 - 6)

## 2020-04-30 NOTE — DIETITIAN INITIAL EVALUATION ADULT. - ADD RECOMMEND
1) Ensure Enlive 240mls 2x daily (700kcal, 40g protein) 2) Ferrous sulfate supplementation 3) Monitor weights, pertinent labs, BMs, skin integrity, and PO intake 4) RD services to remain available

## 2020-04-30 NOTE — DIETITIAN INITIAL EVALUATION ADULT. - OTHER INFO
64-year-old male patient with PMH of BPH, HTN, and adenocarcinoma. New renal mass identified 3/18/20 s/p biopsy and CHOP/chemotherapy in [facility]. Presents with fevers in the setting of COVID-19. Patient pending discharge. Unable to conduct a face to face interview or nutrition-focused physical exam due to limited contact restrictions related to patient's medical condition and isolation precautions. Obtained subjective information from extensive chart review. No noted nausea, vomiting, diarrhea, or constipation. Patient with current weight of 86.4 kg (4/23) and previous weight of 90.9 kg (2/20), indicating a 5% weight loss x 2 months. Patient would benefit from Ensure Enlive 240mls 2x daily (700kcal, 40g protein) for additional calories and protein.     RD services to remain available.

## 2020-04-30 NOTE — PROGRESS NOTE ADULT - ASSESSMENT
· Assessment		   Problem/Plan - 1:  ·  Problem: 2019 novel coronavirus disease (COVID-19).      Plan: Patient recently diagnosed with COVID-19 as outpt on 4/7/20..  His admission chest-ray is consistent with probable viral pneumonia due to SARS-CoV-2.  Currently his SpO2 on room air is 98% and he remains asymptomatic.  - will continue to monitor SpO2 on room air; supplemental oxygen as necessary to maintain SpO2 > 92%.  - no indication for adjunctive therapy for COVID-19 at this time.  - CT angiogram  (4/23) was nondiagnostic for pulmonary embolism due to poor opacification of the pulmonary arteries.  Currently there is a low clinical suspicion for pulmonary embolus (no tachycardia, good room air SpO2, no symptoms), therefore will continue to observe conservatively.  If any changes in his symptoms or SpO2 will consider repeating the CT angiogram.      Problem/Plan - 2:  ·  Problem: Urinary tract infection with hematuria, site unspecified.  Plan: Urine culture growing > 100K CFU/mL of gram negative rods;  E. coli ESBL (extended spectrum beta lactamase) and treated with ertapenem.     - appreciate input from Urology and Infectious Diseases  - CT abd / pelvis 4/28/20 c/w R pyelonephritis / no obstructive uropathy   - ID f/u recommendations noted and appreciated on 10/28/20 - has remained on  ertrapenem and yesterday recommendation by ID to d/c on fosfamycin - the co-pay was high for the pt and today ID recommended cont ertrapenem ; will assess whether can be admin by home care and if so can consider d/c otherwise will rremain here to complete course.      Problem/Plan - 3:  ·  Problem: Adenocarcinoma of right lung, stage 1.  Plan: Plan had been for patient to undergo liquid biopsy following EBUS-guided FNA in March 2020 which demonstrated non-small cell carcinoma.  However, FNA quantity was insufficient for PDL testing or foundation one testing  - appreciate input from Oncology and further F/U as outpt  - CT surgery recs appreciated - no interventions recommended  - COVID and infection management as per primary team  - await path Foundation One results  - outpt f/u at [facility] post D/c     Problem/Plan - 4:  ·  Problem: Hypertension, unspecified type.  Plan: Continue lisinopril and metoprolol with good effect     Problem/Plan - 5:  ·  Problem: History of BPH.  Plan: Continue tamsulosin.

## 2020-04-30 NOTE — DIETITIAN INITIAL EVALUATION ADULT. - PROBLEM SELECTOR PLAN 5
H/o Lung [place]. Pt follows with the [facility]  As per [employer]- pt had a bronchoscopy and biopsy of the hilar LN by Dr Ramos, which showed NSCLC, however quantity is insufficient   plan was for liquid biopsy once patient is recovered from fever and pt was to  follow up with Dr Ramos when pandemia is controlled

## 2020-04-30 NOTE — PROGRESS NOTE ADULT - SUBJECTIVE AND OBJECTIVE BOX
Medicine Follow-up    INTERVAL HPI/OVERNIGHT EVENTS:  Cont to deny all complaints noting no CP, SOB, dysuria, fevers, chills myalgias.     Review of Systems:  General: denies fevers/chills  Respiratory: as above    VITAL SIGNS:  T(F): 97.7 (04-30-20 @ 04:13)  HR: 84 (04-30-20 @ 04:13)  BP: 118/76 (04-30-20 @ 04:13)  RR: 18 (04-30-20 @ 04:13)  SpO2: 98% (04-30-20 @ 04:13) on RA  Wt(kg): --      PHYSICAL EXAM:    MEDICATIONS  (STANDING):  enoxaparin Injectable 40 milliGRAM(s) SubCutaneous daily  ertapenem  IVPB 1000 milliGRAM(s) IV Intermittent every 24 hours  lisinopril 10 milliGRAM(s) Oral daily  metoprolol tartrate 12.5 milliGRAM(s) Oral two times a day  sodium chloride 0.9%. 1000 milliLiter(s) (60 mL/Hr) IV Continuous <Continuous>  tamsulosin 0.4 milliGRAM(s) Oral at bedtime    MEDICATIONS  (PRN):  acetaminophen   Tablet .. 650 milliGRAM(s) Oral every 8 hours PRN Temp greater or equal to 38C (100.4F), Mild Pain (1 - 3), Moderate Pain (4 - 6)      No Known Allergies      LABS:                        12.0   8.88  )-----------( 344      ( 29 Apr 2020 05:57 )             38.9     04-29    136  |  99  |  18  ----------------------------<  100<H>  4.9   |  24  |  0.97    Ca    9.7      29 Apr 2020 05:57  Phos  2.9     04-29  Mg     2.3     04-29             RADIOLOGY & ADDITIONAL TESTS:  Studies reviewed.

## 2020-04-30 NOTE — CHART NOTE - NSCHARTNOTEFT_GEN_A_CORE
spoke with ID Fellow patient will need to continue IV Ertapenem through May 2nd d/w attg spoke with ID Fellow patient will need to continue IV Ertapenem through May 3rd d/w attg

## 2020-05-07 ENCOUNTER — LABORATORY RESULT (OUTPATIENT)
Age: 65
End: 2020-05-07

## 2020-05-13 ENCOUNTER — LABORATORY RESULT (OUTPATIENT)
Age: 65
End: 2020-05-13

## 2020-05-27 ENCOUNTER — OUTPATIENT (OUTPATIENT)
Dept: OUTPATIENT SERVICES | Facility: HOSPITAL | Age: 65
LOS: 1 days | Discharge: HOME | End: 2020-05-27

## 2020-05-27 DIAGNOSIS — Z90.2 ACQUIRED ABSENCE OF LUNG [PART OF]: Chronic | ICD-10-CM

## 2020-05-27 DIAGNOSIS — C34.90 MALIGNANT NEOPLASM OF UNSPECIFIED PART OF UNSPECIFIED BRONCHUS OR LUNG: ICD-10-CM

## 2020-05-27 DIAGNOSIS — M71.20 SYNOVIAL CYST OF POPLITEAL SPACE [BAKER], UNSPECIFIED KNEE: Chronic | ICD-10-CM

## 2020-05-28 ENCOUNTER — OUTPATIENT (OUTPATIENT)
Dept: OUTPATIENT SERVICES | Facility: HOSPITAL | Age: 65
LOS: 1 days | End: 2020-05-28
Payer: COMMERCIAL

## 2020-05-28 ENCOUNTER — APPOINTMENT (OUTPATIENT)
Dept: NUCLEAR MEDICINE | Facility: IMAGING CENTER | Age: 65
End: 2020-05-28
Payer: COMMERCIAL

## 2020-05-28 DIAGNOSIS — C34.90 MALIGNANT NEOPLASM OF UNSPECIFIED PART OF UNSPECIFIED BRONCHUS OR LUNG: ICD-10-CM

## 2020-05-28 DIAGNOSIS — Z90.2 ACQUIRED ABSENCE OF LUNG [PART OF]: Chronic | ICD-10-CM

## 2020-05-28 DIAGNOSIS — M71.20 SYNOVIAL CYST OF POPLITEAL SPACE [BAKER], UNSPECIFIED KNEE: Chronic | ICD-10-CM

## 2020-05-28 PROCEDURE — 78815 PET IMAGE W/CT SKULL-THIGH: CPT

## 2020-05-28 PROCEDURE — 78815 PET IMAGE W/CT SKULL-THIGH: CPT | Mod: 26,PS

## 2020-05-28 PROCEDURE — A9552: CPT

## 2020-06-02 ENCOUNTER — APPOINTMENT (OUTPATIENT)
Dept: HEMATOLOGY ONCOLOGY | Facility: CLINIC | Age: 65
End: 2020-06-02
Payer: COMMERCIAL

## 2020-06-02 PROCEDURE — 99215 OFFICE O/P EST HI 40 MIN: CPT | Mod: 95

## 2020-06-11 ENCOUNTER — OUTPATIENT (OUTPATIENT)
Dept: OUTPATIENT SERVICES | Facility: HOSPITAL | Age: 65
LOS: 1 days | End: 2020-06-11
Payer: COMMERCIAL

## 2020-06-11 ENCOUNTER — APPOINTMENT (OUTPATIENT)
Dept: MRI IMAGING | Facility: CLINIC | Age: 65
End: 2020-06-11
Payer: COMMERCIAL

## 2020-06-11 DIAGNOSIS — Z90.2 ACQUIRED ABSENCE OF LUNG [PART OF]: Chronic | ICD-10-CM

## 2020-06-11 DIAGNOSIS — C34.2 MALIGNANT NEOPLASM OF MIDDLE LOBE, BRONCHUS OR LUNG: ICD-10-CM

## 2020-06-11 DIAGNOSIS — M71.20 SYNOVIAL CYST OF POPLITEAL SPACE [BAKER], UNSPECIFIED KNEE: Chronic | ICD-10-CM

## 2020-06-11 PROCEDURE — 70553 MRI BRAIN STEM W/O & W/DYE: CPT | Mod: 26

## 2020-06-11 PROCEDURE — A9585: CPT

## 2020-06-11 PROCEDURE — 70553 MRI BRAIN STEM W/O & W/DYE: CPT

## 2020-06-15 ENCOUNTER — APPOINTMENT (OUTPATIENT)
Dept: HEMATOLOGY ONCOLOGY | Facility: CLINIC | Age: 65
End: 2020-06-15
Payer: COMMERCIAL

## 2020-06-15 PROCEDURE — 99215 OFFICE O/P EST HI 40 MIN: CPT | Mod: 95

## 2020-06-16 ENCOUNTER — LABORATORY RESULT (OUTPATIENT)
Age: 65
End: 2020-06-16

## 2020-06-17 DIAGNOSIS — Z01.818 ENCOUNTER FOR OTHER PREPROCEDURAL EXAMINATION: ICD-10-CM

## 2020-06-19 ENCOUNTER — APPOINTMENT (OUTPATIENT)
Dept: HEMATOLOGY ONCOLOGY | Facility: CLINIC | Age: 65
End: 2020-06-19

## 2020-06-19 ENCOUNTER — RESULT REVIEW (OUTPATIENT)
Age: 65
End: 2020-06-19

## 2020-06-19 ENCOUNTER — APPOINTMENT (OUTPATIENT)
Dept: DISASTER EMERGENCY | Facility: CLINIC | Age: 65
End: 2020-06-19

## 2020-06-19 LAB
BASOPHILS # BLD AUTO: 0.04 K/UL — SIGNIFICANT CHANGE UP (ref 0–0.2)
BASOPHILS NFR BLD AUTO: 0.4 % — SIGNIFICANT CHANGE UP (ref 0–2)
EOSINOPHIL # BLD AUTO: 0.23 K/UL — SIGNIFICANT CHANGE UP (ref 0–0.5)
EOSINOPHIL NFR BLD AUTO: 2.5 % — SIGNIFICANT CHANGE UP (ref 0–6)
HCT VFR BLD CALC: 43.5 % — SIGNIFICANT CHANGE UP (ref 39–50)
HGB BLD-MCNC: 13.4 G/DL — SIGNIFICANT CHANGE UP (ref 13–17)
IMM GRANULOCYTES NFR BLD AUTO: 0.3 % — SIGNIFICANT CHANGE UP (ref 0–1.5)
LYMPHOCYTES # BLD AUTO: 2.08 K/UL — SIGNIFICANT CHANGE UP (ref 1–3.3)
LYMPHOCYTES # BLD AUTO: 22.8 % — SIGNIFICANT CHANGE UP (ref 13–44)
MCHC RBC-ENTMCNC: 26.2 PG — LOW (ref 27–34)
MCHC RBC-ENTMCNC: 30.8 GM/DL — LOW (ref 32–36)
MCV RBC AUTO: 85 FL — SIGNIFICANT CHANGE UP (ref 80–100)
MONOCYTES # BLD AUTO: 0.86 K/UL — SIGNIFICANT CHANGE UP (ref 0–0.9)
MONOCYTES NFR BLD AUTO: 9.4 % — SIGNIFICANT CHANGE UP (ref 2–14)
NEUTROPHILS # BLD AUTO: 5.87 K/UL — SIGNIFICANT CHANGE UP (ref 1.8–7.4)
NEUTROPHILS NFR BLD AUTO: 64.6 % — SIGNIFICANT CHANGE UP (ref 43–77)
NRBC # BLD: 0 /100 WBCS — SIGNIFICANT CHANGE UP (ref 0–0)
PLATELET # BLD AUTO: 261 K/UL — SIGNIFICANT CHANGE UP (ref 150–400)
RBC # BLD: 5.12 M/UL — SIGNIFICANT CHANGE UP (ref 4.2–5.8)
RBC # FLD: 17.6 % — HIGH (ref 10.3–14.5)
WBC # BLD: 9.11 K/UL — SIGNIFICANT CHANGE UP (ref 3.8–10.5)
WBC # FLD AUTO: 9.11 K/UL — SIGNIFICANT CHANGE UP (ref 3.8–10.5)

## 2020-06-20 LAB — SARS-COV-2 N GENE NPH QL NAA+PROBE: NOT DETECTED

## 2020-06-22 ENCOUNTER — APPOINTMENT (OUTPATIENT)
Dept: ENDOVASCULAR SURGERY | Facility: CLINIC | Age: 65
End: 2020-06-22
Payer: COMMERCIAL

## 2020-06-22 VITALS
RESPIRATION RATE: 15 BRPM | HEART RATE: 79 BPM | TEMPERATURE: 97.8 F | WEIGHT: 196 LBS | SYSTOLIC BLOOD PRESSURE: 170 MMHG | HEIGHT: 63 IN | OXYGEN SATURATION: 98 % | BODY MASS INDEX: 34.73 KG/M2 | DIASTOLIC BLOOD PRESSURE: 81 MMHG

## 2020-06-22 DIAGNOSIS — Z87.891 PERSONAL HISTORY OF NICOTINE DEPENDENCE: ICD-10-CM

## 2020-06-22 LAB
ALBUMIN SERPL ELPH-MCNC: 4.5 G/DL
ALP BLD-CCNC: 101 U/L
ALT SERPL-CCNC: 23 U/L
ANION GAP SERPL CALC-SCNC: 14 MMOL/L
APTT BLD: 31 SEC
AST SERPL-CCNC: 16 U/L
BILIRUB SERPL-MCNC: 0.4 MG/DL
BUN SERPL-MCNC: 16 MG/DL
CALCIUM SERPL-MCNC: 9.8 MG/DL
CHLORIDE SERPL-SCNC: 104 MMOL/L
CO2 SERPL-SCNC: 26 MMOL/L
CREAT SERPL-MCNC: 0.95 MG/DL
GLUCOSE SERPL-MCNC: 106 MG/DL
INR PPP: 0.97 RATIO
POTASSIUM SERPL-SCNC: 5.2 MMOL/L
PROT SERPL-MCNC: 7.4 G/DL
PT BLD: 11 SEC
SODIUM SERPL-SCNC: 144 MMOL/L

## 2020-06-22 PROCEDURE — 77001 FLUOROGUIDE FOR VEIN DEVICE: CPT

## 2020-06-22 PROCEDURE — 99202 OFFICE O/P NEW SF 15 MIN: CPT | Mod: 25

## 2020-06-22 PROCEDURE — 76937 US GUIDE VASCULAR ACCESS: CPT

## 2020-06-22 PROCEDURE — 36561 INSERT TUNNELED CV CATH: CPT

## 2020-06-23 ENCOUNTER — OUTPATIENT (OUTPATIENT)
Dept: OUTPATIENT SERVICES | Facility: HOSPITAL | Age: 65
LOS: 1 days | Discharge: HOME | End: 2020-06-23
Payer: COMMERCIAL

## 2020-06-23 ENCOUNTER — APPOINTMENT (OUTPATIENT)
Dept: RADIATION ONCOLOGY | Facility: CLINIC | Age: 65
End: 2020-06-23
Payer: COMMERCIAL

## 2020-06-23 DIAGNOSIS — Z90.2 ACQUIRED ABSENCE OF LUNG [PART OF]: Chronic | ICD-10-CM

## 2020-06-23 DIAGNOSIS — Z92.21 PERSONAL HISTORY OF ANTINEOPLASTIC CHEMOTHERAPY: ICD-10-CM

## 2020-06-23 DIAGNOSIS — M71.20 SYNOVIAL CYST OF POPLITEAL SPACE [BAKER], UNSPECIFIED KNEE: Chronic | ICD-10-CM

## 2020-06-23 PROCEDURE — 99443: CPT | Mod: GC,25

## 2020-06-23 PROCEDURE — 77263 THER RADIOLOGY TX PLNG CPLX: CPT

## 2020-06-23 RX ORDER — TAMSULOSIN HYDROCHLORIDE 0.4 MG/1
0.4 CAPSULE ORAL
Refills: 0 | Status: ACTIVE | COMMUNITY

## 2020-06-23 NOTE — SOCIAL HISTORY
[Current Cigarette ___ Pack(s) per day] : current cigarette pack(s) per day:  [unfilled]  [Former  Cigarette ___ Pack(s) per day] : former cigarette pack(s) per day:  [unfilled]  [Date (Year) Stopped: _____] : Date (Year) Stopped: [unfilled]

## 2020-06-24 ENCOUNTER — RESULT REVIEW (OUTPATIENT)
Age: 65
End: 2020-06-24

## 2020-06-24 ENCOUNTER — APPOINTMENT (OUTPATIENT)
Dept: HEMATOLOGY ONCOLOGY | Facility: CLINIC | Age: 65
End: 2020-06-24
Payer: COMMERCIAL

## 2020-06-24 ENCOUNTER — APPOINTMENT (OUTPATIENT)
Dept: INFUSION THERAPY | Facility: HOSPITAL | Age: 65
End: 2020-06-24

## 2020-06-24 LAB
BASOPHILS # BLD AUTO: 0.05 K/UL — SIGNIFICANT CHANGE UP (ref 0–0.2)
BASOPHILS NFR BLD AUTO: 0.6 % — SIGNIFICANT CHANGE UP (ref 0–2)
EOSINOPHIL # BLD AUTO: 0.19 K/UL — SIGNIFICANT CHANGE UP (ref 0–0.5)
EOSINOPHIL NFR BLD AUTO: 2.1 % — SIGNIFICANT CHANGE UP (ref 0–6)
HCT VFR BLD CALC: 42 % — SIGNIFICANT CHANGE UP (ref 39–50)
HGB BLD-MCNC: 13.3 G/DL — SIGNIFICANT CHANGE UP (ref 13–17)
IMM GRANULOCYTES NFR BLD AUTO: 0.7 % — SIGNIFICANT CHANGE UP (ref 0–1.5)
LYMPHOCYTES # BLD AUTO: 1.85 K/UL — SIGNIFICANT CHANGE UP (ref 1–3.3)
LYMPHOCYTES # BLD AUTO: 20.6 % — SIGNIFICANT CHANGE UP (ref 13–44)
MCHC RBC-ENTMCNC: 26.1 PG — LOW (ref 27–34)
MCHC RBC-ENTMCNC: 31.7 GM/DL — LOW (ref 32–36)
MCV RBC AUTO: 82.5 FL — SIGNIFICANT CHANGE UP (ref 80–100)
MONOCYTES # BLD AUTO: 0.8 K/UL — SIGNIFICANT CHANGE UP (ref 0–0.9)
MONOCYTES NFR BLD AUTO: 8.9 % — SIGNIFICANT CHANGE UP (ref 2–14)
NEUTROPHILS # BLD AUTO: 6.05 K/UL — SIGNIFICANT CHANGE UP (ref 1.8–7.4)
NEUTROPHILS NFR BLD AUTO: 67.1 % — SIGNIFICANT CHANGE UP (ref 43–77)
NRBC # BLD: 0 /100 WBCS — SIGNIFICANT CHANGE UP (ref 0–0)
PLATELET # BLD AUTO: 279 K/UL — SIGNIFICANT CHANGE UP (ref 150–400)
RBC # BLD: 5.09 M/UL — SIGNIFICANT CHANGE UP (ref 4.2–5.8)
RBC # FLD: 17.2 % — HIGH (ref 10.3–14.5)
WBC # BLD: 9 K/UL — SIGNIFICANT CHANGE UP (ref 3.8–10.5)
WBC # FLD AUTO: 9 K/UL — SIGNIFICANT CHANGE UP (ref 3.8–10.5)

## 2020-06-24 PROCEDURE — 99214 OFFICE O/P EST MOD 30 MIN: CPT

## 2020-06-25 ENCOUNTER — OUTPATIENT (OUTPATIENT)
Dept: OUTPATIENT SERVICES | Facility: HOSPITAL | Age: 65
LOS: 1 days | Discharge: HOME | End: 2020-06-25

## 2020-06-25 DIAGNOSIS — C34.90 MALIGNANT NEOPLASM OF UNSPECIFIED PART OF UNSPECIFIED BRONCHUS OR LUNG: ICD-10-CM

## 2020-06-25 DIAGNOSIS — R11.2 NAUSEA WITH VOMITING, UNSPECIFIED: ICD-10-CM

## 2020-06-25 DIAGNOSIS — M71.20 SYNOVIAL CYST OF POPLITEAL SPACE [BAKER], UNSPECIFIED KNEE: Chronic | ICD-10-CM

## 2020-06-25 DIAGNOSIS — Z51.11 ENCOUNTER FOR ANTINEOPLASTIC CHEMOTHERAPY: ICD-10-CM

## 2020-06-25 DIAGNOSIS — Z90.2 ACQUIRED ABSENCE OF LUNG [PART OF]: Chronic | ICD-10-CM

## 2020-06-25 NOTE — HISTORY OF PRESENT ILLNESS
[FreeTextEntry1] : accompanied by wife\par Covid 6/19/2020: not detected\par feels ok,\par  no meds today\par no falls\par stopped smoking [FreeTextEntry5] : 6/21/2020 7pm [FreeTextEntry6] : Dr. Rose

## 2020-06-25 NOTE — PAST MEDICAL HISTORY
[Increasing age ( >40 years old)] : Increasing age ( >40 years old) [Malignancy] : Malignancy [No therapy indicated for cases scheduled for less than one hour] : No therapy indicated for cases scheduled for less than one hour. [FreeTextEntry1] : Malignant Hyperthermia Screening Tool and Risk of Bleeding Assessment\par \par Mr. SARAH DELGADO denies family history of unexpected death following Anesthesia or Exercise.\par Denies Family history of Malignant Hyperthermia, Muscle or Neuromuscular disorder and High Temperature following exercise.\par \par Mr. SARAH DELGADO denies history of Muscle Spasm, Dark or Chocolate - Colored urine and Unanticipated fever immediately following anesthesia or serious exercise. \par Mr. DELGADO also denies bleeding tendencies/ Risks of Bleeding.\par

## 2020-06-26 ENCOUNTER — APPOINTMENT (OUTPATIENT)
Dept: RADIATION ONCOLOGY | Facility: CLINIC | Age: 65
End: 2020-06-26
Payer: COMMERCIAL

## 2020-06-26 VITALS
BODY MASS INDEX: 37.27 KG/M2 | RESPIRATION RATE: 15 BRPM | SYSTOLIC BLOOD PRESSURE: 166 MMHG | HEIGHT: 63 IN | TEMPERATURE: 98.6 F | DIASTOLIC BLOOD PRESSURE: 96 MMHG | OXYGEN SATURATION: 96 % | WEIGHT: 210.32 LBS | HEART RATE: 85 BPM

## 2020-06-26 PROCEDURE — 77334 RADIATION TREATMENT AID(S): CPT | Mod: 26

## 2020-06-26 PROCEDURE — 99212 OFFICE O/P EST SF 10 MIN: CPT

## 2020-06-26 NOTE — PHYSICAL EXAM
[Hearing Threshold Finger Rub Not Lea] : hearing was normal [Motor Tone] : muscle strength and tone were normal [General Appearance - Alert] : alert [General Appearance - In No Acute Distress] : in no acute distress [Sclera] : the sclera and conjunctiva were normal [] : no respiratory distress [Skin Color & Pigmentation] : normal skin color and pigmentation [No Focal Deficits] : no focal deficits [Oriented To Time, Place, And Person] : oriented to person, place, and time

## 2020-07-01 ENCOUNTER — APPOINTMENT (OUTPATIENT)
Dept: INFUSION THERAPY | Facility: HOSPITAL | Age: 65
End: 2020-07-01

## 2020-07-01 ENCOUNTER — RESULT REVIEW (OUTPATIENT)
Age: 65
End: 2020-07-01

## 2020-07-01 ENCOUNTER — LABORATORY RESULT (OUTPATIENT)
Age: 65
End: 2020-07-01

## 2020-07-01 ENCOUNTER — OUTPATIENT (OUTPATIENT)
Dept: OUTPATIENT SERVICES | Facility: HOSPITAL | Age: 65
LOS: 1 days | End: 2020-07-01
Payer: COMMERCIAL

## 2020-07-01 ENCOUNTER — APPOINTMENT (OUTPATIENT)
Dept: ULTRASOUND IMAGING | Facility: IMAGING CENTER | Age: 65
End: 2020-07-01
Payer: COMMERCIAL

## 2020-07-01 ENCOUNTER — APPOINTMENT (OUTPATIENT)
Dept: HEMATOLOGY ONCOLOGY | Facility: CLINIC | Age: 65
End: 2020-07-01
Payer: COMMERCIAL

## 2020-07-01 VITALS
SYSTOLIC BLOOD PRESSURE: 136 MMHG | DIASTOLIC BLOOD PRESSURE: 80 MMHG | TEMPERATURE: 98.3 F | WEIGHT: 207.23 LBS | BODY MASS INDEX: 36.71 KG/M2 | RESPIRATION RATE: 16 BRPM | OXYGEN SATURATION: 98 % | HEART RATE: 90 BPM

## 2020-07-01 DIAGNOSIS — N50.89 OTHER SPECIFIED DISORDERS OF THE MALE GENITAL ORGANS: ICD-10-CM

## 2020-07-01 DIAGNOSIS — M71.20 SYNOVIAL CYST OF POPLITEAL SPACE [BAKER], UNSPECIFIED KNEE: Chronic | ICD-10-CM

## 2020-07-01 DIAGNOSIS — Z90.2 ACQUIRED ABSENCE OF LUNG [PART OF]: Chronic | ICD-10-CM

## 2020-07-01 LAB
BASOPHILS # BLD AUTO: 0.05 K/UL — SIGNIFICANT CHANGE UP (ref 0–0.2)
BASOPHILS NFR BLD AUTO: 0.5 % — SIGNIFICANT CHANGE UP (ref 0–2)
EOSINOPHIL # BLD AUTO: 0.19 K/UL — SIGNIFICANT CHANGE UP (ref 0–0.5)
EOSINOPHIL NFR BLD AUTO: 1.7 % — SIGNIFICANT CHANGE UP (ref 0–6)
HCT VFR BLD CALC: 40.2 % — SIGNIFICANT CHANGE UP (ref 39–50)
HGB BLD-MCNC: 12.8 G/DL — LOW (ref 13–17)
IMM GRANULOCYTES NFR BLD AUTO: 1.1 % — SIGNIFICANT CHANGE UP (ref 0–1.5)
LYMPHOCYTES # BLD AUTO: 1.63 K/UL — SIGNIFICANT CHANGE UP (ref 1–3.3)
LYMPHOCYTES # BLD AUTO: 14.8 % — SIGNIFICANT CHANGE UP (ref 13–44)
MCHC RBC-ENTMCNC: 26.2 PG — LOW (ref 27–34)
MCHC RBC-ENTMCNC: 31.8 GM/DL — LOW (ref 32–36)
MCV RBC AUTO: 82.4 FL — SIGNIFICANT CHANGE UP (ref 80–100)
MONOCYTES # BLD AUTO: 0.91 K/UL — HIGH (ref 0–0.9)
MONOCYTES NFR BLD AUTO: 8.3 % — SIGNIFICANT CHANGE UP (ref 2–14)
NEUTROPHILS # BLD AUTO: 8.09 K/UL — HIGH (ref 1.8–7.4)
NEUTROPHILS NFR BLD AUTO: 73.6 % — SIGNIFICANT CHANGE UP (ref 43–77)
NRBC # BLD: 0 /100 WBCS — SIGNIFICANT CHANGE UP (ref 0–0)
PLATELET # BLD AUTO: 288 K/UL — SIGNIFICANT CHANGE UP (ref 150–400)
RBC # BLD: 4.88 M/UL — SIGNIFICANT CHANGE UP (ref 4.2–5.8)
RBC # FLD: 17.5 % — HIGH (ref 10.3–14.5)
WBC # BLD: 10.99 K/UL — HIGH (ref 3.8–10.5)
WBC # FLD AUTO: 10.99 K/UL — HIGH (ref 3.8–10.5)

## 2020-07-01 PROCEDURE — 76870 US EXAM SCROTUM: CPT | Mod: 26

## 2020-07-01 PROCEDURE — 99214 OFFICE O/P EST MOD 30 MIN: CPT

## 2020-07-01 PROCEDURE — 76870 US EXAM SCROTUM: CPT

## 2020-07-02 ENCOUNTER — APPOINTMENT (OUTPATIENT)
Dept: UROLOGY | Facility: CLINIC | Age: 65
End: 2020-07-02
Payer: COMMERCIAL

## 2020-07-02 VITALS
DIASTOLIC BLOOD PRESSURE: 78 MMHG | SYSTOLIC BLOOD PRESSURE: 164 MMHG | BODY MASS INDEX: 36.32 KG/M2 | HEART RATE: 77 BPM | WEIGHT: 205 LBS | HEIGHT: 63 IN

## 2020-07-02 VITALS — TEMPERATURE: 97.1 F

## 2020-07-02 DIAGNOSIS — R11.2 NAUSEA WITH VOMITING, UNSPECIFIED: ICD-10-CM

## 2020-07-02 DIAGNOSIS — N50.89 OTHER SPECIFIED DISORDERS OF THE MALE GENITAL ORGANS: ICD-10-CM

## 2020-07-02 DIAGNOSIS — Z51.11 ENCOUNTER FOR ANTINEOPLASTIC CHEMOTHERAPY: ICD-10-CM

## 2020-07-02 DIAGNOSIS — N49.2 INFLAMMATORY DISORDERS OF SCROTUM: ICD-10-CM

## 2020-07-02 LAB — SARS-COV-2 RNA SPEC QL NAA+PROBE: SIGNIFICANT CHANGE UP

## 2020-07-02 PROCEDURE — 99214 OFFICE O/P EST MOD 30 MIN: CPT

## 2020-07-03 PROBLEM — N50.89 SWELLING OF LEFT HALF OF SCROTUM: Status: ACTIVE | Noted: 2020-07-01

## 2020-07-03 PROBLEM — N49.2 SCROTAL ABSCESS: Status: ACTIVE | Noted: 2020-07-03

## 2020-07-03 NOTE — ADDENDUM
[FreeTextEntry1] : This note was authored by Kat Griffith working as scribe for Dr. Umesh Davis. I, Dr. Umesh Davis, have reviewed the content of this note and confirm it is true and accurate. I personally performed the history and physical examination and made all the decisions.\par 07/02/2020

## 2020-07-03 NOTE — PHYSICAL EXAM
[General Appearance - Well Developed] : well developed [Normal Appearance] : normal appearance [General Appearance - Well Nourished] : well nourished [General Appearance - In No Acute Distress] : no acute distress [Well Groomed] : well groomed [Exaggerated Use Of Accessory Muscles For Inspiration] : no accessory muscle use [Edema] : no peripheral edema [Respiration, Rhythm And Depth] : normal respiratory rhythm and effort [Abdomen Tenderness] : non-tender [Abdomen Soft] : soft [Costovertebral Angle Tenderness] : no ~M costovertebral angle tenderness [Urethral Meatus] : meatus normal [Urinary Bladder Findings] : the bladder was normal on palpation [Scrotum] : the scrotum was normal [Testes Mass (___cm)] : there were no testicular masses [No Prostate Nodules] : no prostate nodules [] : no rash [Normal Station and Gait] : the gait and station were normal for the patient's age [Oriented To Time, Place, And Person] : oriented to person, place, and time [No Focal Deficits] : no focal deficits [Mood] : the mood was normal [Affect] : the affect was normal [Not Anxious] : not anxious [No Palpable Adenopathy] : no palpable adenopathy [FreeTextEntry1] : small opening and left scrotal mass at base of the penis. scrotal skin is inderated. it is tender and draining puss.

## 2020-07-03 NOTE — LETTER HEADER
[FreeTextEntry3] : Umesh Davis MD, PhD\par [address] [place] [address]\[place] [phone]

## 2020-07-03 NOTE — HISTORY OF PRESENT ILLNESS
[FreeTextEntry1] : 07/02/2020: 64 year old male presents today for an initial evaluation. Translation by daughter over telephone. Daughter resides in [place]. Patient noticed a scrotal nodule on Saturday and started feeling pain on Monday. There was discharge while he was at the hospital. He has not been given and antibiotic. Pt has lung cancer. US of the testicles on 07/01/2020 shows no testicular lesion. The site of palpable abnormality corresponds to 3.6 x 1.6 x 3.2 cm hypoechoic soft tissue mass involving the skin and subcutaneous tissue in the left superior scrotal wall adjacent to the base of the penis. Internal vascularity is noted. There is no fluid collection. This is likely to represent an inflammatory process and less likely neoplastic. Pt was diagnosed as a prediabetic. Pt may have FMHx of prostate cancer and had a Bx recently. Pt had a fever of 100.5 for half a day on Saturday, but it has cleared. No issues urinating. Pt has BPH and is on Tamsulosin once daily in the morning. Pt has no medical allergies.   Advised pt to have Tamsulosin 30-40 minutes after breakfast. Prescribed Cefadroxil   Advised to have warm water soak, covering the abscess of the scrotum daily.   Pt will provide a urine sample today for culture, cytology and analysis.   RTO in 1-2 weeks or sooner if symptoms worsen.

## 2020-07-03 NOTE — LETTER BODY
[Dear  ___] : Dear  [unfilled], [Please see my note below.] : Please see my note below. [Consult Letter:] : I had the pleasure of evaluating your patient, [unfilled]. [Sincerely,] : Sincerely, [Consult Closing:] : Thank you very much for allowing me to participate in the care of this patient.  If you have any questions, please do not hesitate to contact me. [FreeTextEntry2] : JOSE NICOLE\par [address]\[place] [address]\par [phone] [FreeTextEntry1] : 07/02/2020: 64 year old male presents today for an initial evaluation. Translation by daughter. Noticed a scrotal nodule on Saturday and started feeling pain on Monday. There was discharge while he was at the hospital. He has not been given and antibiotic. Pt has lung cancer. US of the testicles on 07/01/2020 shows no testicular lesion. The site of palpable abnormality corresponds to 3.6 x 1.6 x 3.2 cm hypoechoic soft tissue mass involving the skin and subcutaneous tissue in the left superior scrotal wall adjacent to the base of the penis. Internal vascularity is noted. There is no fluid collection. This is likely to represent an inflammatory process and less likely neoplastic. Pt was diagnosed as a prediabetic. Pt may have FMHx of prostate cancer and had a Bx recently. Pt had a fever of 100.5 for half a day on Saturday, but it has cleared. No issues urinating. Pt has BPH and is on Tamsulosin once daily in the morning. Pt has no medical allergies. \par \par Advised pt to have Tamsulosin 30-40 minutes after breakfast. Prescribed Cefadroxil \par \par Advised to have warm water soak, covering the abscess of the scrotum daily. \par \par Pt will provide a urine sample today for culture, cytology and analysis. \par \par RTO in 1-2 weeks or sooner if symptoms worsen.  [FreeTextEntry3] : Umesh Davis MD, PhD\par [address] [place] [address]\[place] [phone]

## 2020-07-03 NOTE — ASSESSMENT
[FreeTextEntry1] : 07/02/2020: 64 year old male presents today for an initial evaluation. Translation by daughter. Noticed a scrotal nodule on Saturday and started feeling pain on Monday. There was discharge while he was at the hospital. He has not been given and antibiotic. Pt has lung cancer. US of the testicles on 07/01/2020 shows no testicular lesion. The site of palpable abnormality corresponds to 3.6 x 1.6 x 3.2 cm hypoechoic soft tissue mass involving the skin and subcutaneous tissue in the left superior scrotal wall adjacent to the base of the penis. Internal vascularity is noted. There is no fluid collection. This is likely to represent an inflammatory process and less likely neoplastic. Pt was diagnosed as a prediabetic. Pt may have FMHx of prostate cancer and had a Bx recently. Pt had a fever of 100.5 for half a day on Saturday, but it has cleared. No issues urinating. Pt has BPH and is on Tamsulosin once daily in the morning. Pt has no medical allergies. \par \par Advised pt to have Tamsulosin 30-40 minutes after breakfast. Prescribed Cefadroxil \par \par Advised to have warm water soak, covering the abscess of the scrotum daily. \par \par Pt will provide a urine sample today for culture, cytology and analysis. \par \par RTO in 1-2 weeks or sooner if symptoms worsen.

## 2020-07-06 PROCEDURE — 77470 SPECIAL RADIATION TREATMENT: CPT | Mod: 26

## 2020-07-08 ENCOUNTER — LABORATORY RESULT (OUTPATIENT)
Age: 65
End: 2020-07-08

## 2020-07-08 ENCOUNTER — APPOINTMENT (OUTPATIENT)
Dept: INFUSION THERAPY | Facility: HOSPITAL | Age: 65
End: 2020-07-08

## 2020-07-08 ENCOUNTER — APPOINTMENT (OUTPATIENT)
Dept: HEMATOLOGY ONCOLOGY | Facility: CLINIC | Age: 65
End: 2020-07-08
Payer: COMMERCIAL

## 2020-07-08 ENCOUNTER — RESULT REVIEW (OUTPATIENT)
Age: 65
End: 2020-07-08

## 2020-07-08 VITALS
WEIGHT: 207.23 LBS | BODY MASS INDEX: 36.71 KG/M2 | TEMPERATURE: 98 F | DIASTOLIC BLOOD PRESSURE: 87 MMHG | OXYGEN SATURATION: 98 % | HEART RATE: 92 BPM | SYSTOLIC BLOOD PRESSURE: 144 MMHG | RESPIRATION RATE: 14 BRPM

## 2020-07-08 LAB
BASOPHILS # BLD AUTO: 0.05 K/UL — SIGNIFICANT CHANGE UP (ref 0–0.2)
BASOPHILS NFR BLD AUTO: 0.5 % — SIGNIFICANT CHANGE UP (ref 0–2)
EOSINOPHIL # BLD AUTO: 0.13 K/UL — SIGNIFICANT CHANGE UP (ref 0–0.5)
EOSINOPHIL NFR BLD AUTO: 1.4 % — SIGNIFICANT CHANGE UP (ref 0–6)
HCT VFR BLD CALC: 40.7 % — SIGNIFICANT CHANGE UP (ref 39–50)
HGB BLD-MCNC: 13.4 G/DL — SIGNIFICANT CHANGE UP (ref 13–17)
IMM GRANULOCYTES NFR BLD AUTO: 3.3 % — HIGH (ref 0–1.5)
LYMPHOCYTES # BLD AUTO: 1.62 K/UL — SIGNIFICANT CHANGE UP (ref 1–3.3)
LYMPHOCYTES # BLD AUTO: 17.7 % — SIGNIFICANT CHANGE UP (ref 13–44)
MCHC RBC-ENTMCNC: 26.6 PG — LOW (ref 27–34)
MCHC RBC-ENTMCNC: 32.9 GM/DL — SIGNIFICANT CHANGE UP (ref 32–36)
MCV RBC AUTO: 80.9 FL — SIGNIFICANT CHANGE UP (ref 80–100)
MONOCYTES # BLD AUTO: 0.71 K/UL — SIGNIFICANT CHANGE UP (ref 0–0.9)
MONOCYTES NFR BLD AUTO: 7.7 % — SIGNIFICANT CHANGE UP (ref 2–14)
NEUTROPHILS # BLD AUTO: 6.36 K/UL — SIGNIFICANT CHANGE UP (ref 1.8–7.4)
NEUTROPHILS NFR BLD AUTO: 69.4 % — SIGNIFICANT CHANGE UP (ref 43–77)
NRBC # BLD: 0 /100 WBCS — SIGNIFICANT CHANGE UP (ref 0–0)
PLATELET # BLD AUTO: 303 K/UL — SIGNIFICANT CHANGE UP (ref 150–400)
RBC # BLD: 5.03 M/UL — SIGNIFICANT CHANGE UP (ref 4.2–5.8)
RBC # FLD: 17.7 % — HIGH (ref 10.3–14.5)
WBC # BLD: 9.17 K/UL — SIGNIFICANT CHANGE UP (ref 3.8–10.5)
WBC # FLD AUTO: 9.17 K/UL — SIGNIFICANT CHANGE UP (ref 3.8–10.5)

## 2020-07-08 PROCEDURE — 99214 OFFICE O/P EST MOD 30 MIN: CPT

## 2020-07-08 PROCEDURE — 77301 RADIOTHERAPY DOSE PLAN IMRT: CPT | Mod: 26

## 2020-07-08 PROCEDURE — 77338 DESIGN MLC DEVICE FOR IMRT: CPT | Mod: 26

## 2020-07-08 PROCEDURE — 77300 RADIATION THERAPY DOSE PLAN: CPT | Mod: 26

## 2020-07-14 ENCOUNTER — TRANSCRIPTION ENCOUNTER (OUTPATIENT)
Age: 65
End: 2020-07-14

## 2020-07-14 PROCEDURE — G6002: CPT | Mod: 26

## 2020-07-14 NOTE — HISTORY OF PRESENT ILLNESS
[FreeTextEntry1] : Mr. Enrique is a 64 year old man, a smoker (50 p/y), from [facility], with h/o mycosis fungoides, diagnosed in 2015, s/p 7 cycles of CHOP in [facility], and stage I adenocarcinoma of the lung diagnosed 5/2019 s/p surgical resection and 1 cycle of cisplatin/taxol in [facility]. Patient reports that only 1 cycle was recommended and there were not tolerability issues as to why it was stopped. There are reports of a renal mass, however that has not been evident on his imaging here. A few pages of translated medical records were available from [facility] with the above information, and that is all the patient has. He was not able to obtain more medical records.\par \par For 2-3 months prior to initial consult, he had FUO, that is why they traveled to the , where their daughter lives for medical care. Patient's daughter Kamini, is a PA in [place]. \par \par CT in Feb 2020 revealed: Enlarged right hilar lymph node unchanged. \par Apparent right middle lobectomy with postsurgical scarring. \par Bilateral small indeterminant adrenal nodule is unchanged.\par \par He underwent bronchoscopy and biopsy of the hilar LN by Dr Ramos 3/18/20, which showed NSCLC, however quantity is insufficient for PDL1 testing or molecular testing. \par \par \par PET-CT 5/28/20 revealed right paratracheal and perihilar adenopathy increased in size vs. CT 4/23/20, 3.4x2.1 cm SUV 9.3 (had been 2.9x2.5cm). Case was discussed at tumor board and he was felt to have localized hesham intrathoracic recurrence.\par \par Interval 6/26/20: Now presents for f/u and simulation.\par \par [place] # [id number]\par Today he states that he feels well. Notes intermittent mostly dry cough and night sweats. He started taking benzonate capsules with some relief. Denies SOB. He had his first of chemo with good tolerance on Wednesday with Dr. Mascorro.\par

## 2020-07-14 NOTE — HISTORY OF PRESENT ILLNESS
[Home] : at home, [unfilled] , at the time of the visit. [Medical Office: ([facility])___] : at the medical office located in  [Family Member] : family member [] :  [Patient Declined  Services] : - None: Patient declined  services [Verbal consent obtained from patient] : the patient, [unfilled] [FreeTextEntry4] : Rhys horne RN [FreeTextEntry3] : Daughter Fadumocarolann Ortizan translated [TWNoteComboBox1] : Other [FreeTextEntry1] : Mr. Enrique is a 64 year old man, a smoker (50 p/y), from [facility], with h/o mycosis fungoides, diagnosed in 2015, s/p 7 cycles of CHOP in [facility], and stage I adenocarcinoma of the lung diagnosed 5/2019 s/p surgical resection and 1 cycle of cisplatin/taxol in [facility]. Patient reports that only 1 cycle was recommended and there were not tolerability issues as to why it was stopped. There are reports of a renal mass, however that has not been evident on his imaging here. A few pages of translated medical records were available from [facility] with the above information, and that is all the patient has. He was not able to obtain more medical records.\par \par For 2-3 months prior to initial consult, he had FUO, that is why they traveled to the , where their daughter lives for medical care. Patient's daughter Kamini, is a PA in [place]. \par \par CT in Feb 2020 revealed: Enlarged right hilar lymph node unchanged. \par Apparent right middle lobectomy with postsurgical scarring. \par Bilateral small indeterminant adrenal nodule is unchanged.\par \par He underwent bronchoscopy and biopsy of the hilar LN by Dr Ramos 3/18/20, which showed NSCLC, however quantity is insufficient for PDL1 testing or molecular testing. \par \par \par PET-CT 5/28/20 revealed right paratracheal and perihilar adenopathy increased in size vs. CT 4/23/20, 3.4x2.1 cm SUV 9.3 (had been 2.9x2.5cm). Case was discussed at tumor board and he was felt to have localized hesham intrathoracic recurrence.\par \par Spoke to pt and daughter Kamini Reed who translated for him.  No complaints of pain noted. Eating well. Has a dry cough but no SOB at this time.\par

## 2020-07-14 NOTE — REVIEW OF SYSTEMS
[Cough] : cough [Negative] : Integumentary [Constipation: Grade 0] : Constipation: Grade 0 [Diarrhea: Grade 0] : Diarrhea: Grade 0 [Nausea: Grade 0] : Nausea: Grade 0 [Vomiting: Grade 0] : Vomiting: Grade 0 [Dysphagia: Grade 0] : Dysphagia: Grade 0 [Cough: Grade 1 - Mild symptoms; nonprescription intervention indicated] : Cough: Grade 1 - Mild symptoms; nonprescription intervention indicated [Dyspnea: Grade 0] : Dyspnea: Grade 0 [Hiccups: Grade 0] : Hiccups: Grade 0 [Hoarseness: Grade 0] : Hoarseness: Grade 0 [FreeTextEntry6] : dry

## 2020-07-14 NOTE — LETTER CLOSING
[Consult Closing:] : Thank you for allowing me to participate in the care of this patient.  If you have any questions, please do not hesitate to contact me. [FreeTextEntry3] : Ismael James MD\par  and Residency \par Department of Radiation Medicine\par [facility]\par [facility]\par [address] [place] [address]\[place] [address]\par Tel: [phone]\par Fax: [phone]\par \par \par  [Sincerely yours,] : Sincerely yours,

## 2020-07-14 NOTE — REASON FOR VISIT
[Consideration of Curative Therapy] : consideration of curative therapy for [Lung Cancer] : lung cancer [Family Member] : family member [FreeTextEntry2] : Kamini Reed-daughter [FreeTextEntry3] : or [place] [TWNoteComboBox1] : Swiss

## 2020-07-14 NOTE — REVIEW OF SYSTEMS
[Night Sweats] : night sweats [Cough] : cough [Insomnia] : insomnia [Negative] : Integumentary [Fatigue: Grade 0] : Fatigue: Grade 0 [Cough: Grade 1 - Mild symptoms; nonprescription intervention indicated] : Cough: Grade 1 - Mild symptoms; nonprescription intervention indicated [Loss of Hearing] : no loss of hearing [Shortness Of Breath] : no shortness of breath

## 2020-07-15 ENCOUNTER — APPOINTMENT (OUTPATIENT)
Dept: HEMATOLOGY ONCOLOGY | Facility: CLINIC | Age: 65
End: 2020-07-15
Payer: COMMERCIAL

## 2020-07-15 ENCOUNTER — RESULT REVIEW (OUTPATIENT)
Age: 65
End: 2020-07-15

## 2020-07-15 ENCOUNTER — LABORATORY RESULT (OUTPATIENT)
Age: 65
End: 2020-07-15

## 2020-07-15 ENCOUNTER — APPOINTMENT (OUTPATIENT)
Dept: INFUSION THERAPY | Facility: HOSPITAL | Age: 65
End: 2020-07-15

## 2020-07-15 VITALS
OXYGEN SATURATION: 98 % | SYSTOLIC BLOOD PRESSURE: 138 MMHG | TEMPERATURE: 98 F | WEIGHT: 209.44 LBS | BODY MASS INDEX: 37.1 KG/M2 | HEART RATE: 88 BPM | RESPIRATION RATE: 14 BRPM | DIASTOLIC BLOOD PRESSURE: 86 MMHG

## 2020-07-15 VITALS
WEIGHT: 214.07 LBS | SYSTOLIC BLOOD PRESSURE: 134 MMHG | RESPIRATION RATE: 18 BRPM | BODY MASS INDEX: 37.92 KG/M2 | HEART RATE: 80 BPM | OXYGEN SATURATION: 96 % | TEMPERATURE: 98.6 F | DIASTOLIC BLOOD PRESSURE: 70 MMHG

## 2020-07-15 LAB
BASOPHILS # BLD AUTO: 0.06 K/UL — SIGNIFICANT CHANGE UP (ref 0–0.2)
BASOPHILS NFR BLD AUTO: 0.7 % — SIGNIFICANT CHANGE UP (ref 0–2)
EOSINOPHIL # BLD AUTO: 0.1 K/UL — SIGNIFICANT CHANGE UP (ref 0–0.5)
EOSINOPHIL NFR BLD AUTO: 1.2 % — SIGNIFICANT CHANGE UP (ref 0–6)
HCT VFR BLD CALC: 41.7 % — SIGNIFICANT CHANGE UP (ref 39–50)
HGB BLD-MCNC: 13.2 G/DL — SIGNIFICANT CHANGE UP (ref 13–17)
IMM GRANULOCYTES NFR BLD AUTO: 1.2 % — SIGNIFICANT CHANGE UP (ref 0–1.5)
LYMPHOCYTES # BLD AUTO: 1.56 K/UL — SIGNIFICANT CHANGE UP (ref 1–3.3)
LYMPHOCYTES # BLD AUTO: 18.7 % — SIGNIFICANT CHANGE UP (ref 13–44)
MCHC RBC-ENTMCNC: 26.4 PG — LOW (ref 27–34)
MCHC RBC-ENTMCNC: 31.7 GM/DL — LOW (ref 32–36)
MCV RBC AUTO: 83.4 FL — SIGNIFICANT CHANGE UP (ref 80–100)
MONOCYTES # BLD AUTO: 0.54 K/UL — SIGNIFICANT CHANGE UP (ref 0–0.9)
MONOCYTES NFR BLD AUTO: 6.5 % — SIGNIFICANT CHANGE UP (ref 2–14)
NEUTROPHILS # BLD AUTO: 5.97 K/UL — SIGNIFICANT CHANGE UP (ref 1.8–7.4)
NEUTROPHILS NFR BLD AUTO: 71.7 % — SIGNIFICANT CHANGE UP (ref 43–77)
NRBC # BLD: 0 /100 WBCS — SIGNIFICANT CHANGE UP (ref 0–0)
PLATELET # BLD AUTO: 258 K/UL — SIGNIFICANT CHANGE UP (ref 150–400)
RBC # BLD: 5 M/UL — SIGNIFICANT CHANGE UP (ref 4.2–5.8)
RBC # FLD: 18.1 % — HIGH (ref 10.3–14.5)
SARS-COV-2 RNA SPEC QL NAA+PROBE: SIGNIFICANT CHANGE UP
WBC # BLD: 8.33 K/UL — SIGNIFICANT CHANGE UP (ref 3.8–10.5)
WBC # FLD AUTO: 8.33 K/UL — SIGNIFICANT CHANGE UP (ref 3.8–10.5)

## 2020-07-15 PROCEDURE — G6002: CPT | Mod: 26

## 2020-07-15 PROCEDURE — 99214 OFFICE O/P EST MOD 30 MIN: CPT

## 2020-07-15 NOTE — DISEASE MANAGEMENT
[Clinical] : TNM Stage: c [N/A] : Currently not applicable [TTNM] : x [NTNM] : x [MTNM] : x [de-identified] : 2 tx/400cGy [de-identified] : 30  [de-identified] : mediastinum

## 2020-07-15 NOTE — REASON FOR VISIT
[Lung Cancer] : lung cancer [Family Member] : family member [Routine On-Treatment] : a routine on-treatment visit for

## 2020-07-15 NOTE — PHYSICAL EXAM
[Normal] : well developed, well nourished, in no acute distress [] : no respiratory distress [Exaggerated Use Of Accessory Muscles For Inspiration] : no accessory muscle use

## 2020-07-15 NOTE — HISTORY OF PRESENT ILLNESS
[FreeTextEntry1] : Mr. Enrique is a 64 year old man with localized intrathoracic hesham recurrence of lung adenocarcinoma, would be staged N2 if staged de nestor. He is from the [facility] and has a history of mycosis fungoides whiich was diagnosed in 2015,s/p7 cycles of CHOP and stage 1 adenocarcinoma of the lung diagnosed 5/20/19. He had a surgical resection and 1 cycle of cisplatin/taxol. Subsequent PET shows metastatic hilar LNs. \par \par 7/15/2020\par -Tolerating tx well\par -no complaints of pain\par -Continues to have baseline dry cough which is intermittently productive.- stable\par -Only speaks Tanzanian . Daughter translates via telephone\par -No dysphagia and eating well

## 2020-07-15 NOTE — REVIEW OF SYSTEMS
[Constipation: Grade 0] : Constipation: Grade 0 [Dysphagia: Grade 0] : Dysphagia: Grade 0 [Diarrhea: Grade 0] : Diarrhea: Grade 0 [Nausea: Grade 0] : Nausea: Grade 0 [Vomiting: Grade 0] : Vomiting: Grade 0 [Cough: Grade 1 - Mild symptoms; nonprescription intervention indicated] : Cough: Grade 1 - Mild symptoms; nonprescription intervention indicated [Dyspnea: Grade 0] : Dyspnea: Grade 0 [Hoarseness: Grade 0] : Hoarseness: Grade 0

## 2020-07-16 PROCEDURE — G6002: CPT | Mod: 26

## 2020-07-17 ENCOUNTER — APPOINTMENT (OUTPATIENT)
Dept: UROLOGY | Facility: CLINIC | Age: 65
End: 2020-07-17

## 2020-07-17 PROCEDURE — G6002: CPT | Mod: 26

## 2020-07-20 PROCEDURE — 77427 RADIATION TX MANAGEMENT X5: CPT

## 2020-07-20 PROCEDURE — 77014: CPT | Mod: 26

## 2020-07-21 PROCEDURE — G6002: CPT | Mod: 26

## 2020-07-22 ENCOUNTER — RESULT REVIEW (OUTPATIENT)
Age: 65
End: 2020-07-22

## 2020-07-22 ENCOUNTER — LABORATORY RESULT (OUTPATIENT)
Age: 65
End: 2020-07-22

## 2020-07-22 ENCOUNTER — APPOINTMENT (OUTPATIENT)
Dept: HEMATOLOGY ONCOLOGY | Facility: CLINIC | Age: 65
End: 2020-07-22
Payer: COMMERCIAL

## 2020-07-22 ENCOUNTER — APPOINTMENT (OUTPATIENT)
Dept: INFUSION THERAPY | Facility: HOSPITAL | Age: 65
End: 2020-07-22

## 2020-07-22 VITALS
BODY MASS INDEX: 37.49 KG/M2 | OXYGEN SATURATION: 96 % | SYSTOLIC BLOOD PRESSURE: 142 MMHG | TEMPERATURE: 98.1 F | WEIGHT: 211.64 LBS | HEART RATE: 104 BPM | DIASTOLIC BLOOD PRESSURE: 88 MMHG | RESPIRATION RATE: 18 BRPM

## 2020-07-22 VITALS
WEIGHT: 213.84 LBS | DIASTOLIC BLOOD PRESSURE: 88 MMHG | TEMPERATURE: 96.9 F | RESPIRATION RATE: 15 BRPM | OXYGEN SATURATION: 96 % | BODY MASS INDEX: 37.88 KG/M2 | SYSTOLIC BLOOD PRESSURE: 153 MMHG | HEART RATE: 89 BPM

## 2020-07-22 LAB
BASOPHILS # BLD AUTO: 0.03 K/UL — SIGNIFICANT CHANGE UP (ref 0–0.2)
BASOPHILS NFR BLD AUTO: 0.5 % — SIGNIFICANT CHANGE UP (ref 0–2)
EOSINOPHIL # BLD AUTO: 0.07 K/UL — SIGNIFICANT CHANGE UP (ref 0–0.5)
EOSINOPHIL NFR BLD AUTO: 1.1 % — SIGNIFICANT CHANGE UP (ref 0–6)
HCT VFR BLD CALC: 40.4 % — SIGNIFICANT CHANGE UP (ref 39–50)
HGB BLD-MCNC: 13 G/DL — SIGNIFICANT CHANGE UP (ref 13–17)
IMM GRANULOCYTES NFR BLD AUTO: 1.6 % — HIGH (ref 0–1.5)
LYMPHOCYTES # BLD AUTO: 0.94 K/UL — LOW (ref 1–3.3)
LYMPHOCYTES # BLD AUTO: 15.1 % — SIGNIFICANT CHANGE UP (ref 13–44)
MCHC RBC-ENTMCNC: 27 PG — SIGNIFICANT CHANGE UP (ref 27–34)
MCHC RBC-ENTMCNC: 32.2 GM/DL — SIGNIFICANT CHANGE UP (ref 32–36)
MCV RBC AUTO: 83.8 FL — SIGNIFICANT CHANGE UP (ref 80–100)
MONOCYTES # BLD AUTO: 0.41 K/UL — SIGNIFICANT CHANGE UP (ref 0–0.9)
MONOCYTES NFR BLD AUTO: 6.6 % — SIGNIFICANT CHANGE UP (ref 2–14)
NEUTROPHILS # BLD AUTO: 4.68 K/UL — SIGNIFICANT CHANGE UP (ref 1.8–7.4)
NEUTROPHILS NFR BLD AUTO: 75.1 % — SIGNIFICANT CHANGE UP (ref 43–77)
NRBC # BLD: 0 /100 WBCS — SIGNIFICANT CHANGE UP (ref 0–0)
PLATELET # BLD AUTO: 186 K/UL — SIGNIFICANT CHANGE UP (ref 150–400)
RBC # BLD: 4.82 M/UL — SIGNIFICANT CHANGE UP (ref 4.2–5.8)
RBC # FLD: 18.6 % — HIGH (ref 10.3–14.5)
WBC # BLD: 6.23 K/UL — SIGNIFICANT CHANGE UP (ref 3.8–10.5)
WBC # FLD AUTO: 6.23 K/UL — SIGNIFICANT CHANGE UP (ref 3.8–10.5)

## 2020-07-22 PROCEDURE — G6002: CPT | Mod: 26

## 2020-07-22 PROCEDURE — 99213 OFFICE O/P EST LOW 20 MIN: CPT

## 2020-07-23 VITALS
HEART RATE: 88 BPM | WEIGHT: 214.73 LBS | SYSTOLIC BLOOD PRESSURE: 145 MMHG | OXYGEN SATURATION: 98 % | BODY MASS INDEX: 38.04 KG/M2 | TEMPERATURE: 37.2 F | DIASTOLIC BLOOD PRESSURE: 81 MMHG | RESPIRATION RATE: 18 BRPM

## 2020-07-23 PROCEDURE — G6002: CPT | Mod: 26

## 2020-07-23 NOTE — REVIEW OF SYSTEMS
[Constipation: Grade 0] : Constipation: Grade 0 [Diarrhea: Grade 0] : Diarrhea: Grade 0 [Dysphagia: Grade 0] : Dysphagia: Grade 0 [Nausea: Grade 0] : Nausea: Grade 0 [Vomiting: Grade 0] : Vomiting: Grade 0 [Cough: Grade 1 - Mild symptoms; nonprescription intervention indicated] : Cough: Grade 1 - Mild symptoms; nonprescription intervention indicated [Dyspnea: Grade 0] : Dyspnea: Grade 0 [Hoarseness: Grade 0] : Hoarseness: Grade 0 [Dermatitis Radiation: Grade 0] : Dermatitis Radiation: Grade 0

## 2020-07-24 ENCOUNTER — OUTPATIENT (OUTPATIENT)
Dept: OUTPATIENT SERVICES | Facility: HOSPITAL | Age: 65
LOS: 1 days | Discharge: HOME | End: 2020-07-24

## 2020-07-24 DIAGNOSIS — C34.90 MALIGNANT NEOPLASM OF UNSPECIFIED PART OF UNSPECIFIED BRONCHUS OR LUNG: ICD-10-CM

## 2020-07-24 DIAGNOSIS — R06.6 HICCOUGH: ICD-10-CM

## 2020-07-24 DIAGNOSIS — Z90.2 ACQUIRED ABSENCE OF LUNG [PART OF]: Chronic | ICD-10-CM

## 2020-07-24 DIAGNOSIS — M71.20 SYNOVIAL CYST OF POPLITEAL SPACE [BAKER], UNSPECIFIED KNEE: Chronic | ICD-10-CM

## 2020-07-24 PROCEDURE — G6002: CPT | Mod: 26

## 2020-07-24 RX ORDER — BACLOFEN 5 MG/1
5 TABLET ORAL
Qty: 120 | Refills: 0 | Status: ACTIVE | COMMUNITY
Start: 2020-07-24 | End: 1900-01-01

## 2020-07-27 PROCEDURE — 77014: CPT | Mod: 26

## 2020-07-27 PROCEDURE — 77427 RADIATION TX MANAGEMENT X5: CPT

## 2020-07-28 PROCEDURE — 88342 IMHCHEM/IMCYTCHM 1ST ANTB: CPT | Mod: 26

## 2020-07-28 PROCEDURE — G6002: CPT | Mod: 26

## 2020-07-28 PROCEDURE — 88341 IMHCHEM/IMCYTCHM EA ADD ANTB: CPT | Mod: 26

## 2020-07-29 ENCOUNTER — RESULT REVIEW (OUTPATIENT)
Age: 65
End: 2020-07-29

## 2020-07-29 ENCOUNTER — LABORATORY RESULT (OUTPATIENT)
Age: 65
End: 2020-07-29

## 2020-07-29 ENCOUNTER — APPOINTMENT (OUTPATIENT)
Dept: INFUSION THERAPY | Facility: HOSPITAL | Age: 65
End: 2020-07-29

## 2020-07-29 ENCOUNTER — APPOINTMENT (OUTPATIENT)
Dept: HEMATOLOGY ONCOLOGY | Facility: CLINIC | Age: 65
End: 2020-07-29
Payer: COMMERCIAL

## 2020-07-29 VITALS
WEIGHT: 214.73 LBS | DIASTOLIC BLOOD PRESSURE: 74 MMHG | OXYGEN SATURATION: 97 % | HEART RATE: 87 BPM | BODY MASS INDEX: 38.04 KG/M2 | SYSTOLIC BLOOD PRESSURE: 126 MMHG | TEMPERATURE: 98.2 F | RESPIRATION RATE: 16 BRPM

## 2020-07-29 LAB
BASOPHILS # BLD AUTO: 0.04 K/UL — SIGNIFICANT CHANGE UP (ref 0–0.2)
BASOPHILS NFR BLD AUTO: 0.7 % — SIGNIFICANT CHANGE UP (ref 0–2)
EOSINOPHIL # BLD AUTO: 0.08 K/UL — SIGNIFICANT CHANGE UP (ref 0–0.5)
EOSINOPHIL NFR BLD AUTO: 1.4 % — SIGNIFICANT CHANGE UP (ref 0–6)
HCT VFR BLD CALC: 39 % — SIGNIFICANT CHANGE UP (ref 39–50)
HGB BLD-MCNC: 12.8 G/DL — LOW (ref 13–17)
IMM GRANULOCYTES NFR BLD AUTO: 1.9 % — HIGH (ref 0–1.5)
LYMPHOCYTES # BLD AUTO: 0.77 K/UL — LOW (ref 1–3.3)
LYMPHOCYTES # BLD AUTO: 13.1 % — SIGNIFICANT CHANGE UP (ref 13–44)
MCHC RBC-ENTMCNC: 27.2 PG — SIGNIFICANT CHANGE UP (ref 27–34)
MCHC RBC-ENTMCNC: 32.8 GM/DL — SIGNIFICANT CHANGE UP (ref 32–36)
MCV RBC AUTO: 82.8 FL — SIGNIFICANT CHANGE UP (ref 80–100)
MONOCYTES # BLD AUTO: 0.39 K/UL — SIGNIFICANT CHANGE UP (ref 0–0.9)
MONOCYTES NFR BLD AUTO: 6.6 % — SIGNIFICANT CHANGE UP (ref 2–14)
NEUTROPHILS # BLD AUTO: 4.49 K/UL — SIGNIFICANT CHANGE UP (ref 1.8–7.4)
NEUTROPHILS NFR BLD AUTO: 76.3 % — SIGNIFICANT CHANGE UP (ref 43–77)
NRBC # BLD: 0 /100 WBCS — SIGNIFICANT CHANGE UP (ref 0–0)
PLATELET # BLD AUTO: 167 K/UL — SIGNIFICANT CHANGE UP (ref 150–400)
RBC # BLD: 4.71 M/UL — SIGNIFICANT CHANGE UP (ref 4.2–5.8)
RBC # FLD: 18.6 % — HIGH (ref 10.3–14.5)
WBC # BLD: 5.88 K/UL — SIGNIFICANT CHANGE UP (ref 3.8–10.5)
WBC # FLD AUTO: 5.88 K/UL — SIGNIFICANT CHANGE UP (ref 3.8–10.5)

## 2020-07-29 PROCEDURE — G6002: CPT | Mod: 26

## 2020-07-29 PROCEDURE — 99214 OFFICE O/P EST MOD 30 MIN: CPT

## 2020-07-29 RX ORDER — IPRATROPIUM BROMIDE AND ALBUTEROL SULFATE 2.5; .5 MG/3ML; MG/3ML
0.5-2.5 (3) SOLUTION RESPIRATORY (INHALATION) EVERY 6 HOURS
Qty: 1 | Refills: 0 | Status: ACTIVE | COMMUNITY
Start: 2020-07-29 | End: 1900-01-01

## 2020-07-30 VITALS
TEMPERATURE: 96.6 F | RESPIRATION RATE: 18 BRPM | DIASTOLIC BLOOD PRESSURE: 80 MMHG | SYSTOLIC BLOOD PRESSURE: 139 MMHG | HEART RATE: 94 BPM | OXYGEN SATURATION: 99 % | BODY MASS INDEX: 37.88 KG/M2 | WEIGHT: 213.85 LBS

## 2020-07-30 DIAGNOSIS — Z51.11 ENCOUNTER FOR ANTINEOPLASTIC CHEMOTHERAPY: ICD-10-CM

## 2020-07-30 DIAGNOSIS — R11.2 NAUSEA WITH VOMITING, UNSPECIFIED: ICD-10-CM

## 2020-07-30 PROCEDURE — G6002: CPT | Mod: 26

## 2020-07-30 NOTE — REVIEW OF SYSTEMS
[Constipation: Grade 0] : Constipation: Grade 0 [Nausea: Grade 0] : Nausea: Grade 0 [Dysphagia: Grade 0] : Dysphagia: Grade 0 [Diarrhea: Grade 0] : Diarrhea: Grade 0 [Vomiting: Grade 0] : Vomiting: Grade 0 [Hoarseness: Grade 0] : Hoarseness: Grade 0 [Cough: Grade 1 - Mild symptoms; nonprescription intervention indicated] : Cough: Grade 1 - Mild symptoms; nonprescription intervention indicated [Dyspnea: Grade 0] : Dyspnea: Grade 0 [Dermatitis Radiation: Grade 0] : Dermatitis Radiation: Grade 0

## 2020-07-31 PROCEDURE — G6002: CPT | Mod: 26

## 2020-08-03 PROCEDURE — 77014: CPT | Mod: 26

## 2020-08-04 PROCEDURE — G6002: CPT | Mod: 26

## 2020-08-05 ENCOUNTER — RESULT REVIEW (OUTPATIENT)
Age: 65
End: 2020-08-05

## 2020-08-05 ENCOUNTER — APPOINTMENT (OUTPATIENT)
Dept: INFUSION THERAPY | Facility: HOSPITAL | Age: 65
End: 2020-08-05

## 2020-08-05 ENCOUNTER — LABORATORY RESULT (OUTPATIENT)
Age: 65
End: 2020-08-05

## 2020-08-05 ENCOUNTER — APPOINTMENT (OUTPATIENT)
Dept: HEMATOLOGY ONCOLOGY | Facility: CLINIC | Age: 65
End: 2020-08-05
Payer: COMMERCIAL

## 2020-08-05 VITALS
OXYGEN SATURATION: 99 % | DIASTOLIC BLOOD PRESSURE: 82 MMHG | HEART RATE: 80 BPM | WEIGHT: 213.85 LBS | TEMPERATURE: 98 F | RESPIRATION RATE: 18 BRPM | BODY MASS INDEX: 37.88 KG/M2 | SYSTOLIC BLOOD PRESSURE: 137 MMHG

## 2020-08-05 VITALS
TEMPERATURE: 37.5 F | DIASTOLIC BLOOD PRESSURE: 83 MMHG | WEIGHT: 218.47 LBS | SYSTOLIC BLOOD PRESSURE: 124 MMHG | OXYGEN SATURATION: 97 % | BODY MASS INDEX: 38.7 KG/M2 | RESPIRATION RATE: 18 BRPM | HEART RATE: 80 BPM

## 2020-08-05 LAB
BASOPHILS # BLD AUTO: 0.01 K/UL — SIGNIFICANT CHANGE UP (ref 0–0.2)
BASOPHILS NFR BLD AUTO: 0.2 % — SIGNIFICANT CHANGE UP (ref 0–2)
EOSINOPHIL # BLD AUTO: 0.1 K/UL — SIGNIFICANT CHANGE UP (ref 0–0.5)
EOSINOPHIL NFR BLD AUTO: 1.7 % — SIGNIFICANT CHANGE UP (ref 0–6)
HCT VFR BLD CALC: 40.1 % — SIGNIFICANT CHANGE UP (ref 39–50)
HGB BLD-MCNC: 12.8 G/DL — LOW (ref 13–17)
IMM GRANULOCYTES NFR BLD AUTO: 1.6 % — HIGH (ref 0–1.5)
LYMPHOCYTES # BLD AUTO: 0.63 K/UL — LOW (ref 1–3.3)
LYMPHOCYTES # BLD AUTO: 11 % — LOW (ref 13–44)
MCHC RBC-ENTMCNC: 27 PG — SIGNIFICANT CHANGE UP (ref 27–34)
MCHC RBC-ENTMCNC: 31.9 GM/DL — LOW (ref 32–36)
MCV RBC AUTO: 84.6 FL — SIGNIFICANT CHANGE UP (ref 80–100)
MONOCYTES # BLD AUTO: 0.45 K/UL — SIGNIFICANT CHANGE UP (ref 0–0.9)
MONOCYTES NFR BLD AUTO: 7.9 % — SIGNIFICANT CHANGE UP (ref 2–14)
NEUTROPHILS # BLD AUTO: 4.45 K/UL — SIGNIFICANT CHANGE UP (ref 1.8–7.4)
NEUTROPHILS NFR BLD AUTO: 77.6 % — HIGH (ref 43–77)
NRBC # BLD: 0 /100 WBCS — SIGNIFICANT CHANGE UP (ref 0–0)
PLATELET # BLD AUTO: 162 K/UL — SIGNIFICANT CHANGE UP (ref 150–400)
RBC # BLD: 4.74 M/UL — SIGNIFICANT CHANGE UP (ref 4.2–5.8)
RBC # FLD: 18.6 % — HIGH (ref 10.3–14.5)
SARS-COV-2 RNA SPEC QL NAA+PROBE: SIGNIFICANT CHANGE UP
WBC # BLD: 5.73 K/UL — SIGNIFICANT CHANGE UP (ref 3.8–10.5)
WBC # FLD AUTO: 5.73 K/UL — SIGNIFICANT CHANGE UP (ref 3.8–10.5)

## 2020-08-05 PROCEDURE — 77427 RADIATION TX MANAGEMENT X5: CPT

## 2020-08-05 PROCEDURE — 99214 OFFICE O/P EST MOD 30 MIN: CPT

## 2020-08-05 PROCEDURE — G6002: CPT | Mod: 26

## 2020-08-05 NOTE — DISEASE MANAGEMENT
[Clinical] : TNM Stage: c [N/A] : Currently not applicable [TTNM] : x [NTNM] : x [de-identified] : 17tx/3400cGy [MTNM] : x [de-identified] : mediastinum [de-identified] : 30tx / 6000cGy

## 2020-08-05 NOTE — HISTORY OF PRESENT ILLNESS
[FreeTextEntry1] : Mr. Enrique is a 64 year old man with localized intrathoracic hesham recurrence of lung adenocarcinoma, would be staged N2 if staged de nestor. He is from the [facility] and has a history of mycosis fungoides whiich was diagnosed in 2015,s/p7 cycles of CHOP and stage 1 adenocarcinoma of the lung diagnosed 5/20/19. He had a surgical resection and 1 cycle of cisplatin/taxol. Subsequent PET shows metastatic hilar LNs. \par \par 7/15/2020\par -Tolerating tx well\par -no complaints of pain\par -Continues to have baseline dry cough which is intermittently productive.- stable\par \par 7/22/2020\par -tolerating tx\par -no complaints of pain\par -pt has semi-productive cough-stopped smoking 4 months ago\par -eating well-no dysphagia\par -pacific  used\par -Only speaks Jamaican . Daughter translates via telephone\par -No dysphagia and eating well\par \par 7/22/2020\par -Tolerating tx well\par -no complaints of pain\par -Continues to have baseline dry cough which is intermittently productive.- stable\par -Only speaks Jamaican . Daughter translates via telephone\par -No dysphagia and eating well\par \par 7/30/2020\par -Tolerating tx\par -No complaints of pain noted\par -Has semi productive cough and taking hycodan\par -Eating well\par \par 8/5/2020\par -Tolerating tx\par -No complaints of pain\par -eating well. no dysphagia\par -cough and mild wheeze improved since starting albuterol\par -All translated by daughter on phone-speaks only Argentine

## 2020-08-05 NOTE — REVIEW OF SYSTEMS
[Constipation: Grade 0] : Constipation: Grade 0 [Diarrhea: Grade 0] : Diarrhea: Grade 0 [Nausea: Grade 0] : Nausea: Grade 0 [Dysphagia: Grade 0] : Dysphagia: Grade 0 [Vomiting: Grade 0] : Vomiting: Grade 0 [Dyspnea: Grade 0] : Dyspnea: Grade 0 [Hoarseness: Grade 0] : Hoarseness: Grade 0 [Cough: Grade 1 - Mild symptoms; nonprescription intervention indicated] : Cough: Grade 1 - Mild symptoms; nonprescription intervention indicated [Dermatitis Radiation: Grade 0] : Dermatitis Radiation: Grade 0 [Esophagitis: Grade 0] : Esophagitis: Grade 0

## 2020-08-05 NOTE — REASON FOR VISIT
[Lung Cancer] : lung cancer [Routine On-Treatment] : a routine on-treatment visit for [FreeTextEntry1] : [id number] [TWNoteComboBox1] : Northern Irish

## 2020-08-06 PROCEDURE — G6002: CPT | Mod: 26

## 2020-08-07 PROCEDURE — G6002: CPT | Mod: 26

## 2020-08-10 PROCEDURE — 77014: CPT | Mod: 26

## 2020-08-11 PROCEDURE — G6002: CPT | Mod: 26

## 2020-08-12 ENCOUNTER — RESULT REVIEW (OUTPATIENT)
Age: 65
End: 2020-08-12

## 2020-08-12 ENCOUNTER — APPOINTMENT (OUTPATIENT)
Dept: HEMATOLOGY ONCOLOGY | Facility: CLINIC | Age: 65
End: 2020-08-12
Payer: COMMERCIAL

## 2020-08-12 ENCOUNTER — APPOINTMENT (OUTPATIENT)
Dept: INFUSION THERAPY | Facility: HOSPITAL | Age: 65
End: 2020-08-12

## 2020-08-12 ENCOUNTER — LABORATORY RESULT (OUTPATIENT)
Age: 65
End: 2020-08-12

## 2020-08-12 VITALS
DIASTOLIC BLOOD PRESSURE: 76 MMHG | BODY MASS INDEX: 38.66 KG/M2 | SYSTOLIC BLOOD PRESSURE: 113 MMHG | OXYGEN SATURATION: 98 % | HEART RATE: 83 BPM | WEIGHT: 218.26 LBS | TEMPERATURE: 98 F | RESPIRATION RATE: 16 BRPM

## 2020-08-12 LAB
BASOPHILS # BLD AUTO: 0.02 K/UL — SIGNIFICANT CHANGE UP (ref 0–0.2)
BASOPHILS NFR BLD AUTO: 0.4 % — SIGNIFICANT CHANGE UP (ref 0–2)
EOSINOPHIL # BLD AUTO: 0.11 K/UL — SIGNIFICANT CHANGE UP (ref 0–0.5)
EOSINOPHIL NFR BLD AUTO: 2.4 % — SIGNIFICANT CHANGE UP (ref 0–6)
HCT VFR BLD CALC: 37.5 % — LOW (ref 39–50)
HGB BLD-MCNC: 12.3 G/DL — LOW (ref 13–17)
IMM GRANULOCYTES NFR BLD AUTO: 1.3 % — SIGNIFICANT CHANGE UP (ref 0–1.5)
LYMPHOCYTES # BLD AUTO: 0.59 K/UL — LOW (ref 1–3.3)
LYMPHOCYTES # BLD AUTO: 13.1 % — SIGNIFICANT CHANGE UP (ref 13–44)
MCHC RBC-ENTMCNC: 27.4 PG — SIGNIFICANT CHANGE UP (ref 27–34)
MCHC RBC-ENTMCNC: 32.8 GM/DL — SIGNIFICANT CHANGE UP (ref 32–36)
MCV RBC AUTO: 83.5 FL — SIGNIFICANT CHANGE UP (ref 80–100)
MONOCYTES # BLD AUTO: 0.35 K/UL — SIGNIFICANT CHANGE UP (ref 0–0.9)
MONOCYTES NFR BLD AUTO: 7.8 % — SIGNIFICANT CHANGE UP (ref 2–14)
NEUTROPHILS # BLD AUTO: 3.38 K/UL — SIGNIFICANT CHANGE UP (ref 1.8–7.4)
NEUTROPHILS NFR BLD AUTO: 75 % — SIGNIFICANT CHANGE UP (ref 43–77)
NRBC # BLD: 0 /100 WBCS — SIGNIFICANT CHANGE UP (ref 0–0)
PLATELET # BLD AUTO: 178 K/UL — SIGNIFICANT CHANGE UP (ref 150–400)
RBC # BLD: 4.49 M/UL — SIGNIFICANT CHANGE UP (ref 4.2–5.8)
RBC # FLD: 19 % — HIGH (ref 10.3–14.5)
WBC # BLD: 4.51 K/UL — SIGNIFICANT CHANGE UP (ref 3.8–10.5)
WBC # FLD AUTO: 4.51 K/UL — SIGNIFICANT CHANGE UP (ref 3.8–10.5)

## 2020-08-12 PROCEDURE — 99213 OFFICE O/P EST LOW 20 MIN: CPT

## 2020-08-13 VITALS
TEMPERATURE: 37 F | SYSTOLIC BLOOD PRESSURE: 150 MMHG | HEART RATE: 100 BPM | WEIGHT: 220.13 LBS | DIASTOLIC BLOOD PRESSURE: 93 MMHG | OXYGEN SATURATION: 98 % | RESPIRATION RATE: 18 BRPM | BODY MASS INDEX: 38.99 KG/M2

## 2020-08-13 PROCEDURE — 77427 RADIATION TX MANAGEMENT X5: CPT

## 2020-08-13 PROCEDURE — G6002: CPT | Mod: 26

## 2020-08-13 NOTE — REVIEW OF SYSTEMS
[Constipation: Grade 0] : Constipation: Grade 0 [Diarrhea: Grade 0] : Diarrhea: Grade 0 [Nausea: Grade 0] : Nausea: Grade 0 [Dysphagia: Grade 0] : Dysphagia: Grade 0 [Vomiting: Grade 0] : Vomiting: Grade 0 [Hoarseness: Grade 0] : Hoarseness: Grade 0 [Cough: Grade 1 - Mild symptoms; nonprescription intervention indicated] : Cough: Grade 1 - Mild symptoms; nonprescription intervention indicated [Dyspnea: Grade 0] : Dyspnea: Grade 0 [Dermatitis Radiation: Grade 0] : Dermatitis Radiation: Grade 0

## 2020-08-14 PROCEDURE — G6002: CPT | Mod: 26

## 2020-08-16 NOTE — DISEASE MANAGEMENT
[Clinical] : TNM Stage: c [N/A] : Currently not applicable [TTNM] : x [NTNM] : x [MTNM] : x [de-identified] : 22tx/4400cGy [de-identified] : 30tx / 6000cGy [de-identified] : mediastinum

## 2020-08-16 NOTE — REASON FOR VISIT
[Routine On-Treatment] : a routine on-treatment visit for [Lung Cancer] : lung cancer [FreeTextEntry1] : [id number] [TWNoteComboBox1] : Icelandic

## 2020-08-16 NOTE — REASON FOR VISIT
[Lung Cancer] : lung cancer [Routine On-Treatment] : a routine on-treatment visit for [FreeTextEntry1] : [id number] [TWNoteComboBox1] : Andorran

## 2020-08-16 NOTE — DISEASE MANAGEMENT
[Clinical] : TNM Stage: c [N/A] : Currently not applicable [TTNM] : x [NTNM] : x [de-identified] : 13 [id number] [MTNM] : x [de-identified] : mediastinum [de-identified] : 30

## 2020-08-16 NOTE — HISTORY OF PRESENT ILLNESS
[FreeTextEntry1] : Mr. Enrique is a 64 year old man with localized intrathoracic hesham recurrence of lung adenocarcinoma, would be staged N2 if staged de nestor. He is from the [facility] and has a history of mycosis fungoides whiich was diagnosed in 2015,s/p7 cycles of CHOP and stage 1 adenocarcinoma of the lung diagnosed 5/20/19. He had a surgical resection and 1 cycle of cisplatin/taxol. Subsequent PET shows metastatic hilar LNs. \par \par 7/15/2020\par -Tolerating tx well\par -no complaints of pain\par -Continues to have baseline dry cough which is intermittently productive.- stable\par \par 7/22/2020\par -tolerating tx\par -no complaints of pain\par -pt has semi-productive cough-stopped smoking 4 months ago\par -eating well-no dysphagia\par -pacific  used\par -Only speaks Sudanese . Daughter translates via telephone\par -No dysphagia and eating well\par \par 7/22/2020\par -Tolerating tx well\par -no complaints of pain\par -Continues to have baseline dry cough which is intermittently productive.- stable\par -Only speaks Sudanese . Daughter translates via telephone\par -No dysphagia and eating well

## 2020-08-16 NOTE — DISEASE MANAGEMENT
[Clinical] : TNM Stage: c [N/A] : Currently not applicable [TTNM] : x [NTNM] : x [MTNM] : x [de-identified] : 8tx/1600cGy [de-identified] : mediastinum [de-identified] : 30

## 2020-08-16 NOTE — PHYSICAL EXAM
[Normal] : oriented to person, place and time, the affect was normal, the mood was normal and not anxious [de-identified] : diffuse wheezing

## 2020-08-16 NOTE — HISTORY OF PRESENT ILLNESS
[FreeTextEntry1] : Mr. Enrique is a 64 year old man with localized intrathoracic hesham recurrence of lung adenocarcinoma, would be staged N2 if staged de nestor. He is from the [facility] and has a history of mycosis fungoides whiich was diagnosed in 2015,s/p7 cycles of CHOP and stage 1 adenocarcinoma of the lung diagnosed 5/20/19. He had a surgical resection and 1 cycle of cisplatin/taxol. Subsequent PET shows metastatic hilar LNs. \par \par 7/15/2020\par -Tolerating tx well\par -no complaints of pain\par -Continues to have baseline dry cough which is intermittently productive.- stable\par \par 7/22/2020\par -tolerating tx\par -no complaints of pain\par -pt has semi-productive cough-stopped smoking 4 months ago\par -eating well-no dysphagia\par -pacific  used\par -Only speaks Fijian . Daughter translates via telephone\par -No dysphagia and eating well\par \par 7/22/2020\par -Tolerating tx well\par -no complaints of pain\par -Continues to have baseline dry cough which is intermittently productive.- stable\par -Only speaks Fijian . Daughter translates via telephone\par -No dysphagia and eating well\par \par 7/30/2020\par -Tolerating tx\par -No complaints of pain noted\par -Has semi productive cokugh and taking hycodan\par -Eating well

## 2020-08-16 NOTE — REASON FOR VISIT
[Routine On-Treatment] : a routine on-treatment visit for [Lung Cancer] : lung cancer [TWNoteComboBox1] : South Korean [FreeTextEntry1] : [id number]

## 2020-08-16 NOTE — HISTORY OF PRESENT ILLNESS
[FreeTextEntry1] : Mr. Enrique is a 64 year old man with localized intrathoracic hesham recurrence of lung adenocarcinoma, would be staged N2 if staged de nestor. He is from the [facility] and has a history of mycosis fungoides whiich was diagnosed in 2015,s/p7 cycles of CHOP and stage 1 adenocarcinoma of the lung diagnosed 5/20/19. He had a surgical resection and 1 cycle of cisplatin/taxol. Subsequent PET shows metastatic hilar LNs. \par \par 7/15/2020\par -Tolerating tx well\par -no complaints of pain\par -Continues to have baseline dry cough which is intermittently productive.- stable\par \par 7/22/2020\par -tolerating tx\par -no complaints of pain\par -pt has semi-productive cough-stopped smoking 4 months ago\par -eating well-no dysphagia\par -pacific  used\par -Only speaks Cypriot . Daughter translates via telephone\par -No dysphagia and eating well\par \par 7/22/2020\par -Tolerating tx well\par -no complaints of pain\par -Continues to have baseline dry cough which is intermittently productive.- stable\par -Only speaks Cypriot . Daughter translates via telephone\par -No dysphagia and eating well\par \par 7/30/2020\par -Tolerating tx\par -No complaints of pain noted\par -Has semi productive cokugh and taking hycodan\par -Eating well\par \par 8/13/2020\par -tolerating tx well\par -Eating well,no dysphagia\par -No cough or SOB noted.\par -No erythema\par -will complete in 10 days. discharge packet given with followup appt.

## 2020-08-17 PROCEDURE — G6002: CPT | Mod: 26

## 2020-08-18 ENCOUNTER — OUTPATIENT (OUTPATIENT)
Dept: OUTPATIENT SERVICES | Facility: HOSPITAL | Age: 65
LOS: 1 days | Discharge: HOME | End: 2020-08-18

## 2020-08-18 DIAGNOSIS — C34.90 MALIGNANT NEOPLASM OF UNSPECIFIED PART OF UNSPECIFIED BRONCHUS OR LUNG: ICD-10-CM

## 2020-08-18 DIAGNOSIS — Z90.2 ACQUIRED ABSENCE OF LUNG [PART OF]: Chronic | ICD-10-CM

## 2020-08-18 DIAGNOSIS — M71.20 SYNOVIAL CYST OF POPLITEAL SPACE [BAKER], UNSPECIFIED KNEE: Chronic | ICD-10-CM

## 2020-08-18 PROCEDURE — 77014: CPT | Mod: 26

## 2020-08-19 ENCOUNTER — RESULT REVIEW (OUTPATIENT)
Age: 65
End: 2020-08-19

## 2020-08-19 ENCOUNTER — APPOINTMENT (OUTPATIENT)
Dept: INFUSION THERAPY | Facility: HOSPITAL | Age: 65
End: 2020-08-19

## 2020-08-19 ENCOUNTER — LABORATORY RESULT (OUTPATIENT)
Age: 65
End: 2020-08-19

## 2020-08-19 ENCOUNTER — APPOINTMENT (OUTPATIENT)
Dept: HEMATOLOGY ONCOLOGY | Facility: CLINIC | Age: 65
End: 2020-08-19
Payer: COMMERCIAL

## 2020-08-19 VITALS
BODY MASS INDEX: 38.66 KG/M2 | WEIGHT: 218.26 LBS | OXYGEN SATURATION: 97 % | HEART RATE: 100 BPM | TEMPERATURE: 98.2 F | DIASTOLIC BLOOD PRESSURE: 84 MMHG | SYSTOLIC BLOOD PRESSURE: 129 MMHG | RESPIRATION RATE: 18 BRPM

## 2020-08-19 LAB
BASOPHILS # BLD AUTO: 0.02 K/UL — SIGNIFICANT CHANGE UP (ref 0–0.2)
BASOPHILS NFR BLD AUTO: 0.4 % — SIGNIFICANT CHANGE UP (ref 0–2)
EOSINOPHIL # BLD AUTO: 0.06 K/UL — SIGNIFICANT CHANGE UP (ref 0–0.5)
EOSINOPHIL NFR BLD AUTO: 1.1 % — SIGNIFICANT CHANGE UP (ref 0–6)
HCT VFR BLD CALC: 39.6 % — SIGNIFICANT CHANGE UP (ref 39–50)
HGB BLD-MCNC: 13.3 G/DL — SIGNIFICANT CHANGE UP (ref 13–17)
IMM GRANULOCYTES NFR BLD AUTO: 1.3 % — SIGNIFICANT CHANGE UP (ref 0–1.5)
LYMPHOCYTES # BLD AUTO: 0.54 K/UL — LOW (ref 1–3.3)
LYMPHOCYTES # BLD AUTO: 10.3 % — LOW (ref 13–44)
MCHC RBC-ENTMCNC: 28.1 PG — SIGNIFICANT CHANGE UP (ref 27–34)
MCHC RBC-ENTMCNC: 33.6 GM/DL — SIGNIFICANT CHANGE UP (ref 32–36)
MCV RBC AUTO: 83.5 FL — SIGNIFICANT CHANGE UP (ref 80–100)
MONOCYTES # BLD AUTO: 0.45 K/UL — SIGNIFICANT CHANGE UP (ref 0–0.9)
MONOCYTES NFR BLD AUTO: 8.6 % — SIGNIFICANT CHANGE UP (ref 2–14)
NEUTROPHILS # BLD AUTO: 4.12 K/UL — SIGNIFICANT CHANGE UP (ref 1.8–7.4)
NEUTROPHILS NFR BLD AUTO: 78.3 % — HIGH (ref 43–77)
NRBC # BLD: 0 /100 WBCS — SIGNIFICANT CHANGE UP (ref 0–0)
PLATELET # BLD AUTO: 230 K/UL — SIGNIFICANT CHANGE UP (ref 150–400)
RBC # BLD: 4.74 M/UL — SIGNIFICANT CHANGE UP (ref 4.2–5.8)
RBC # FLD: 19.3 % — HIGH (ref 10.3–14.5)
WBC # BLD: 5.26 K/UL — SIGNIFICANT CHANGE UP (ref 3.8–10.5)
WBC # FLD AUTO: 5.26 K/UL — SIGNIFICANT CHANGE UP (ref 3.8–10.5)

## 2020-08-19 PROCEDURE — 99213 OFFICE O/P EST LOW 20 MIN: CPT

## 2020-08-19 PROCEDURE — G6002: CPT | Mod: 26

## 2020-08-20 VITALS
WEIGHT: 221.23 LBS | TEMPERATURE: 96.9 F | HEIGHT: 63 IN | HEART RATE: 96 BPM | SYSTOLIC BLOOD PRESSURE: 129 MMHG | RESPIRATION RATE: 18 BRPM | BODY MASS INDEX: 39.2 KG/M2 | DIASTOLIC BLOOD PRESSURE: 78 MMHG | OXYGEN SATURATION: 97 %

## 2020-08-20 DIAGNOSIS — Z51.11 ENCOUNTER FOR ANTINEOPLASTIC CHEMOTHERAPY: ICD-10-CM

## 2020-08-20 DIAGNOSIS — R11.2 NAUSEA WITH VOMITING, UNSPECIFIED: ICD-10-CM

## 2020-08-20 PROCEDURE — G6002: CPT | Mod: 26

## 2020-08-20 PROCEDURE — 77427 RADIATION TX MANAGEMENT X5: CPT

## 2020-08-20 NOTE — REVIEW OF SYSTEMS
[Constipation: Grade 0] : Constipation: Grade 0 [Diarrhea: Grade 0] : Diarrhea: Grade 0 [Dysphagia: Grade 0] : Dysphagia: Grade 0 [Vomiting: Grade 0] : Vomiting: Grade 0 [Nausea: Grade 0] : Nausea: Grade 0 [Cough: Grade 1 - Mild symptoms; nonprescription intervention indicated] : Cough: Grade 1 - Mild symptoms; nonprescription intervention indicated [Hoarseness: Grade 0] : Hoarseness: Grade 0 [Dyspnea: Grade 0] : Dyspnea: Grade 0 [Dermatitis Radiation: Grade 0] : Dermatitis Radiation: Grade 0

## 2020-08-21 PROCEDURE — G6002: CPT | Mod: 26

## 2020-08-24 PROCEDURE — G6002: CPT | Mod: 26

## 2020-08-25 PROCEDURE — 77014: CPT | Mod: 26

## 2020-08-26 ENCOUNTER — APPOINTMENT (OUTPATIENT)
Dept: INFUSION THERAPY | Facility: HOSPITAL | Age: 65
End: 2020-08-26

## 2020-08-26 ENCOUNTER — APPOINTMENT (OUTPATIENT)
Dept: HEMATOLOGY ONCOLOGY | Facility: CLINIC | Age: 65
End: 2020-08-26

## 2020-08-31 ENCOUNTER — RESULT REVIEW (OUTPATIENT)
Age: 65
End: 2020-08-31

## 2020-08-31 ENCOUNTER — APPOINTMENT (OUTPATIENT)
Dept: CT IMAGING | Facility: IMAGING CENTER | Age: 65
End: 2020-08-31
Payer: COMMERCIAL

## 2020-08-31 ENCOUNTER — OUTPATIENT (OUTPATIENT)
Dept: OUTPATIENT SERVICES | Facility: HOSPITAL | Age: 65
LOS: 1 days | End: 2020-08-31
Payer: COMMERCIAL

## 2020-08-31 ENCOUNTER — APPOINTMENT (OUTPATIENT)
Dept: PULMONOLOGY | Facility: CLINIC | Age: 65
End: 2020-08-31

## 2020-08-31 DIAGNOSIS — Z00.8 ENCOUNTER FOR OTHER GENERAL EXAMINATION: ICD-10-CM

## 2020-08-31 DIAGNOSIS — M71.20 SYNOVIAL CYST OF POPLITEAL SPACE [BAKER], UNSPECIFIED KNEE: Chronic | ICD-10-CM

## 2020-08-31 DIAGNOSIS — Z90.2 ACQUIRED ABSENCE OF LUNG [PART OF]: Chronic | ICD-10-CM

## 2020-08-31 DIAGNOSIS — C34.2 MALIGNANT NEOPLASM OF MIDDLE LOBE, BRONCHUS OR LUNG: ICD-10-CM

## 2020-08-31 DIAGNOSIS — C77.1 SECONDARY AND UNSPECIFIED MALIGNANT NEOPLASM OF INTRATHORACIC LYMPH NODES: ICD-10-CM

## 2020-08-31 PROCEDURE — 71250 CT THORAX DX C-: CPT | Mod: 26

## 2020-08-31 PROCEDURE — 71250 CT THORAX DX C-: CPT

## 2020-09-02 ENCOUNTER — APPOINTMENT (OUTPATIENT)
Dept: HEMATOLOGY ONCOLOGY | Facility: CLINIC | Age: 65
End: 2020-09-02
Payer: COMMERCIAL

## 2020-09-02 ENCOUNTER — APPOINTMENT (OUTPATIENT)
Dept: INFUSION THERAPY | Facility: HOSPITAL | Age: 65
End: 2020-09-02

## 2020-09-02 VITALS
SYSTOLIC BLOOD PRESSURE: 145 MMHG | OXYGEN SATURATION: 98 % | HEART RATE: 97 BPM | BODY MASS INDEX: 38.86 KG/M2 | WEIGHT: 219.36 LBS | DIASTOLIC BLOOD PRESSURE: 92 MMHG | TEMPERATURE: 97.6 F | RESPIRATION RATE: 14 BRPM

## 2020-09-02 PROCEDURE — 99215 OFFICE O/P EST HI 40 MIN: CPT

## 2020-09-09 ENCOUNTER — RESULT REVIEW (OUTPATIENT)
Age: 65
End: 2020-09-09

## 2020-09-09 ENCOUNTER — LABORATORY RESULT (OUTPATIENT)
Age: 65
End: 2020-09-09

## 2020-09-09 ENCOUNTER — APPOINTMENT (OUTPATIENT)
Dept: HEMATOLOGY ONCOLOGY | Facility: CLINIC | Age: 65
End: 2020-09-09

## 2020-09-09 ENCOUNTER — APPOINTMENT (OUTPATIENT)
Dept: INFUSION THERAPY | Facility: HOSPITAL | Age: 65
End: 2020-09-09

## 2020-09-09 LAB
BASOPHILS # BLD AUTO: 0.04 K/UL — SIGNIFICANT CHANGE UP (ref 0–0.2)
BASOPHILS NFR BLD AUTO: 0.6 % — SIGNIFICANT CHANGE UP (ref 0–2)
EOSINOPHIL # BLD AUTO: 0.08 K/UL — SIGNIFICANT CHANGE UP (ref 0–0.5)
EOSINOPHIL NFR BLD AUTO: 1.2 % — SIGNIFICANT CHANGE UP (ref 0–6)
HCT VFR BLD CALC: 36.6 % — LOW (ref 39–50)
HGB BLD-MCNC: 11.9 G/DL — LOW (ref 13–17)
IMM GRANULOCYTES NFR BLD AUTO: 0.8 % — SIGNIFICANT CHANGE UP (ref 0–1.5)
LYMPHOCYTES # BLD AUTO: 0.83 K/UL — LOW (ref 1–3.3)
LYMPHOCYTES # BLD AUTO: 12.7 % — LOW (ref 13–44)
MCHC RBC-ENTMCNC: 28.5 PG — SIGNIFICANT CHANGE UP (ref 27–34)
MCHC RBC-ENTMCNC: 32.5 G/DL — SIGNIFICANT CHANGE UP (ref 32–36)
MCV RBC AUTO: 87.8 FL — SIGNIFICANT CHANGE UP (ref 80–100)
MONOCYTES # BLD AUTO: 0.92 K/UL — HIGH (ref 0–0.9)
MONOCYTES NFR BLD AUTO: 14.1 % — HIGH (ref 2–14)
NEUTROPHILS # BLD AUTO: 4.6 K/UL — SIGNIFICANT CHANGE UP (ref 1.8–7.4)
NEUTROPHILS NFR BLD AUTO: 70.6 % — SIGNIFICANT CHANGE UP (ref 43–77)
NRBC # BLD: 0 /100 WBCS — SIGNIFICANT CHANGE UP (ref 0–0)
PLATELET # BLD AUTO: 211 K/UL — SIGNIFICANT CHANGE UP (ref 150–400)
RBC # BLD: 4.17 M/UL — LOW (ref 4.2–5.8)
RBC # FLD: 18.6 % — HIGH (ref 10.3–14.5)
WBC # BLD: 6.52 K/UL — SIGNIFICANT CHANGE UP (ref 3.8–10.5)
WBC # FLD AUTO: 6.52 K/UL — SIGNIFICANT CHANGE UP (ref 3.8–10.5)

## 2020-09-09 RX ORDER — ERGOCALCIFEROL 1.25 MG/1
1 CAPSULE ORAL
Qty: 0 | Refills: 0 | DISCHARGE

## 2020-09-16 NOTE — HISTORY OF PRESENT ILLNESS
[FreeTextEntry1] : Mr. Enrique is a 64 year old man with localized intrathoracic hesham recurrence of lung adenocarcinoma, would be staged N2 if staged de nestor. He is from the [facility] and has a history of mycosis fungoides whiich was diagnosed in 2015,s/p7 cycles of CHOP and stage 1 adenocarcinoma of the lung diagnosed 5/20/19. He had a surgical resection and 1 cycle of cisplatin/taxol. Subsequent PET shows metastatic hilar LNs. \par \par 7/15/2020\par -Tolerating tx well\par -no complaints of pain\par -Continues to have baseline dry cough which is intermittently productive.- stable\par \par 7/22/2020\par -tolerating tx\par -no complaints of pain\par -pt has semi-productive cough-stopped smoking 4 months ago\par -eating well-no dysphagia\par -pacific  used\par -Only speaks Burmese . Daughter translates via telephone\par -No dysphagia and eating well\par \par 7/22/2020\par -Tolerating tx well\par -no complaints of pain\par -Continues to have baseline dry cough which is intermittently productive.- stable\par -Only speaks Burmese . Daughter translates via telephone\par -No dysphagia and eating well\par \par 7/30/2020\par -Tolerating tx\par -No complaints of pain noted\par -Has semi productive cokugh and taking hycodan\par -Eating well\par \par 8/13/2020\par -tolerating tx well\par -Eating well,no dysphagia\par -No cough or SOB noted.\par -No erythema\par -will complete in 10 days. discharge packet given with followup appt.\par \par 8/20/20\par Tolerating treatment well\par Reports morning cough managed on bronchodilators.\par Denies SOB, Chest pain

## 2020-09-16 NOTE — REASON FOR VISIT
[Lung Cancer] : lung cancer [Routine On-Treatment] : a routine on-treatment visit for [Patient Declined  Services] : - None: Patient declined  services [FreeTextEntry2] : Carrie Peng [TWNoteComboBox1] : [place] Islander

## 2020-09-16 NOTE — DISEASE MANAGEMENT
[Clinical] : TNM Stage: c [N/A] : Currently not applicable [TTNM] : x [NTNM] : x [MTNM] : x [de-identified] : 27tx/5400cGy [de-identified] : mediastinum [de-identified] : 30tx / 6000cGy

## 2020-09-18 ENCOUNTER — OUTPATIENT (OUTPATIENT)
Dept: OUTPATIENT SERVICES | Facility: HOSPITAL | Age: 65
LOS: 1 days | Discharge: HOME | End: 2020-09-18

## 2020-09-18 DIAGNOSIS — M71.20 SYNOVIAL CYST OF POPLITEAL SPACE [BAKER], UNSPECIFIED KNEE: Chronic | ICD-10-CM

## 2020-09-18 DIAGNOSIS — Z90.2 ACQUIRED ABSENCE OF LUNG [PART OF]: Chronic | ICD-10-CM

## 2020-09-18 DIAGNOSIS — C34.90 MALIGNANT NEOPLASM OF UNSPECIFIED PART OF UNSPECIFIED BRONCHUS OR LUNG: ICD-10-CM

## 2020-09-23 ENCOUNTER — APPOINTMENT (OUTPATIENT)
Dept: INFUSION THERAPY | Facility: HOSPITAL | Age: 65
End: 2020-09-23

## 2020-09-23 ENCOUNTER — RESULT REVIEW (OUTPATIENT)
Age: 65
End: 2020-09-23

## 2020-09-23 ENCOUNTER — LABORATORY RESULT (OUTPATIENT)
Age: 65
End: 2020-09-23

## 2020-09-23 ENCOUNTER — APPOINTMENT (OUTPATIENT)
Dept: HEMATOLOGY ONCOLOGY | Facility: CLINIC | Age: 65
End: 2020-09-23
Payer: COMMERCIAL

## 2020-09-23 VITALS
SYSTOLIC BLOOD PRESSURE: 134 MMHG | TEMPERATURE: 97.2 F | WEIGHT: 222.67 LBS | RESPIRATION RATE: 14 BRPM | DIASTOLIC BLOOD PRESSURE: 82 MMHG | HEART RATE: 87 BPM | OXYGEN SATURATION: 98 % | BODY MASS INDEX: 39.44 KG/M2

## 2020-09-23 DIAGNOSIS — G47.00 INSOMNIA, UNSPECIFIED: ICD-10-CM

## 2020-09-23 LAB
BASOPHILS # BLD AUTO: 0.02 K/UL — SIGNIFICANT CHANGE UP (ref 0–0.2)
BASOPHILS NFR BLD AUTO: 0.3 % — SIGNIFICANT CHANGE UP (ref 0–2)
EOSINOPHIL # BLD AUTO: 0.21 K/UL — SIGNIFICANT CHANGE UP (ref 0–0.5)
EOSINOPHIL NFR BLD AUTO: 3.5 % — SIGNIFICANT CHANGE UP (ref 0–6)
HCT VFR BLD CALC: 36.5 % — LOW (ref 39–50)
HGB BLD-MCNC: 11.9 G/DL — LOW (ref 13–17)
IMM GRANULOCYTES NFR BLD AUTO: 0.3 % — SIGNIFICANT CHANGE UP (ref 0–1.5)
LYMPHOCYTES # BLD AUTO: 0.93 K/UL — LOW (ref 1–3.3)
LYMPHOCYTES # BLD AUTO: 15.7 % — SIGNIFICANT CHANGE UP (ref 13–44)
MCHC RBC-ENTMCNC: 28.6 PG — SIGNIFICANT CHANGE UP (ref 27–34)
MCHC RBC-ENTMCNC: 32.6 G/DL — SIGNIFICANT CHANGE UP (ref 32–36)
MCV RBC AUTO: 87.7 FL — SIGNIFICANT CHANGE UP (ref 80–100)
MONOCYTES # BLD AUTO: 0.74 K/UL — SIGNIFICANT CHANGE UP (ref 0–0.9)
MONOCYTES NFR BLD AUTO: 12.5 % — SIGNIFICANT CHANGE UP (ref 2–14)
NEUTROPHILS # BLD AUTO: 4.02 K/UL — SIGNIFICANT CHANGE UP (ref 1.8–7.4)
NEUTROPHILS NFR BLD AUTO: 67.7 % — SIGNIFICANT CHANGE UP (ref 43–77)
NRBC # BLD: 0 /100 WBCS — SIGNIFICANT CHANGE UP (ref 0–0)
PLATELET # BLD AUTO: 196 K/UL — SIGNIFICANT CHANGE UP (ref 150–400)
RBC # BLD: 4.16 M/UL — LOW (ref 4.2–5.8)
RBC # FLD: 18.5 % — HIGH (ref 10.3–14.5)
WBC # BLD: 5.94 K/UL — SIGNIFICANT CHANGE UP (ref 3.8–10.5)
WBC # FLD AUTO: 5.94 K/UL — SIGNIFICANT CHANGE UP (ref 3.8–10.5)

## 2020-09-23 PROCEDURE — 99213 OFFICE O/P EST LOW 20 MIN: CPT

## 2020-09-23 RX ORDER — ZOLPIDEM TARTRATE 10 MG/1
10 TABLET ORAL
Qty: 30 | Refills: 5 | Status: ACTIVE | COMMUNITY
Start: 2020-09-23 | End: 1900-01-01

## 2020-09-24 DIAGNOSIS — R11.2 NAUSEA WITH VOMITING, UNSPECIFIED: ICD-10-CM

## 2020-09-24 DIAGNOSIS — Z51.11 ENCOUNTER FOR ANTINEOPLASTIC CHEMOTHERAPY: ICD-10-CM

## 2020-09-25 PROBLEM — G47.00 INSOMNIA: Status: ACTIVE | Noted: 2020-09-23

## 2020-10-07 ENCOUNTER — APPOINTMENT (OUTPATIENT)
Dept: INFUSION THERAPY | Facility: HOSPITAL | Age: 65
End: 2020-10-07

## 2020-10-09 LAB — SARS-COV-2 N GENE NPH QL NAA+PROBE: NOT DETECTED

## 2020-10-12 ENCOUNTER — APPOINTMENT (OUTPATIENT)
Dept: PULMONOLOGY | Facility: CLINIC | Age: 65
End: 2020-10-12
Payer: COMMERCIAL

## 2020-10-12 VITALS
OXYGEN SATURATION: 97 % | SYSTOLIC BLOOD PRESSURE: 146 MMHG | HEART RATE: 90 BPM | BODY MASS INDEX: 39.51 KG/M2 | RESPIRATION RATE: 17 BRPM | TEMPERATURE: 98.2 F | WEIGHT: 223 LBS | DIASTOLIC BLOOD PRESSURE: 80 MMHG | HEIGHT: 63 IN

## 2020-10-12 DIAGNOSIS — Z87.891 PERSONAL HISTORY OF NICOTINE DEPENDENCE: ICD-10-CM

## 2020-10-12 DIAGNOSIS — Z80.1 FAMILY HISTORY OF MALIGNANT NEOPLASM OF TRACHEA, BRONCHUS AND LUNG: ICD-10-CM

## 2020-10-12 DIAGNOSIS — Z72.89 OTHER PROBLEMS RELATED TO LIFESTYLE: ICD-10-CM

## 2020-10-12 DIAGNOSIS — Z85.9 PERSONAL HISTORY OF MALIGNANT NEOPLASM, UNSPECIFIED: ICD-10-CM

## 2020-10-12 PROCEDURE — 71046 X-RAY EXAM CHEST 2 VIEWS: CPT

## 2020-10-12 PROCEDURE — 99204 OFFICE O/P NEW MOD 45 MIN: CPT | Mod: 25

## 2020-10-12 PROCEDURE — 94729 DIFFUSING CAPACITY: CPT

## 2020-10-12 PROCEDURE — 95012 NITRIC OXIDE EXP GAS DETER: CPT

## 2020-10-12 PROCEDURE — 94618 PULMONARY STRESS TESTING: CPT

## 2020-10-12 PROCEDURE — 94727 GAS DIL/WSHOT DETER LNG VOL: CPT

## 2020-10-12 PROCEDURE — 94010 BREATHING CAPACITY TEST: CPT

## 2020-10-12 NOTE — PROCEDURE
[FreeTextEntry1] : CT (SEP.2.2020) IMPRESSION: no change from prior study. \par \par CT (APR.27.2020) IMPRESSION: right sided pyelonephritis. No abscess or hydronephrosis. \par \par CT (FEB.14.2020) IMPRESSION: Enlarged right hilar lymph node unchanged. \par Apparent right middle lobectomy with postsurgical scarring. \par Bilateral small indeterminant adrenal nodule is unchanged.\par \par PET /CT (JUN.1.2020) IMPRESSION: 1. metastatic right paratracheal and right perihilar lymphadenopathy, increased in size as compared to CT dated 4.23.2020. small FDG avid left perihilar lymph node is indeterminate.\par  2. non fdg avid density in right mid lung, unchanged as compared to CT dated 4.27.2020, and slightly increased in size as compared to CT dated 2/14/2020, possibly post surgical. \par 3. linear subcutaneous density associated heterogeneous FDG activity, right anterior chest wall, is secondary to prior surgery. Small focus of more intense activity is indeterminate. \par 4. minimall FDG avid lucencies within anterolateral aspects of right fourth and fifth ribs, unchanged on CT since 2/14/2020, may be related to prior surgery. \par \par \par CXR mild cardiomegaly, right sided port in place and atelectatic band in right mid lung \par \par 6 minute walk test reveals a low saturation of 94% with no evidence of dyspnea or fatigue; walked 407.5 meters\par \par Full PFT revealed mild obstructive flows, with a FEV1 of 2.13L, which is 85% of predicted, mid to low lung volumes, and a diffusion of 35.6 , which is 149% of predicted, with a normal flow volume loop.\par \par \par \par \par

## 2020-10-12 NOTE — PHYSICAL EXAM
[No Acute Distress] : no acute distress [Normal Oropharynx] : normal oropharynx [Normal Appearance] : normal appearance [No Neck Mass] : no neck mass [Normal Rate/Rhythm] : normal rate/rhythm [Normal S1, S2] : normal s1, s2 [No Murmurs] : no murmurs [No Resp Distress] : no resp distress [Clear to Auscultation Bilaterally] : clear to auscultation bilaterally [No Abnormalities] : no abnormalities [Benign] : benign [Normal Gait] : normal gait [No Clubbing] : no clubbing [No Cyanosis] : no cyanosis [No Edema] : no edema [FROM] : FROM [Normal Color/ Pigmentation] : normal color/ pigmentation [No Focal Deficits] : no focal deficits [Oriented x3] : oriented x3 [Normal Affect] : normal affect [III] : Mallampati Class: III [TextBox_68] : I:E 1:3, mild expiratory wheeze

## 2020-10-12 NOTE — ADDENDUM
[FreeTextEntry1] : Documented by Sylvia Cuellar acting as a scribe for Dr. Mateus Finch on 10/12/2020 \par \par All medical record entries made by the Scribe were at my, Dr. Mateus Finch's, direction and personally dictated by me on 10/12/2020 . I have reviewed the chart and agree that the record accurately reflects my personal performance of the history, physical exam, assessment and plan. I have also personally directed, reviewed, and agree with the discharge instructions.

## 2020-10-12 NOTE — HISTORY OF PRESENT ILLNESS
[TextBox_4] : Mr. DELGADO is a 64 year old male presenting to the office today for initial pulmonary evaluation. His chief complaint is\par - he has been having a lot of SOB probably due to his weight or due to having a lobectomy. When he ties his shoe or when he goes up the stairs he gets out of breath. This has been happening for awhile. \par - he has a cough that is constant. \par - he also has been wheezing. Dr. Mascorro gave him cough syrup and Tessalon Perles but the cough still continues. \par - he has some PND \par - before he coughed up blood but not anymore\par - no nausea / vomiting\par - he has low grade fevers once in awhile \par - he gets constipation a little bit \par - no difficulty swallowing\par - energy levels have been 9 out of 10, he has good energy levels \par - his lung cancer was accidentally found, he never really would show signs of sickness since his energy levels are high\par - he snores at night \par - neck size: 18 inches \par -  He reports being able to fall asleep as a passenger in a car for over an hour\par - his memory / concentration could be better, got worse after treatment \par - no ankle / leg swelling \par - his weight has gone up between treatments. Since COVID he has not been doing much. \par - he has been eating well \par -he denies any visual issues, headaches, nausea, vomiting, fever, chills, sweats, chest pain, chest pressure, diarrhea, constipation, dysphagia, dizziness, leg swelling, leg pain, itchy eyes, itchy ears, heartburn, reflux, sour taste in the mouth, myalgias or arthralgias

## 2020-10-14 RX ORDER — AZELASTINE HYDROCHLORIDE 137 UG/1
0.1 SPRAY, METERED NASAL TWICE DAILY
Qty: 3 | Refills: 1 | Status: ACTIVE | COMMUNITY
Start: 2020-10-14 | End: 1900-01-01

## 2020-10-14 RX ORDER — OLOPATADINE HYDROCHLORIDE 665 UG/1
0.6 SPRAY, METERED NASAL
Qty: 3 | Refills: 1 | Status: DISCONTINUED | COMMUNITY
Start: 2020-10-12 | End: 2020-10-14

## 2020-10-16 ENCOUNTER — OUTPATIENT (OUTPATIENT)
Dept: OUTPATIENT SERVICES | Facility: HOSPITAL | Age: 65
LOS: 1 days | Discharge: HOME | End: 2020-10-16

## 2020-10-16 DIAGNOSIS — M71.20 SYNOVIAL CYST OF POPLITEAL SPACE [BAKER], UNSPECIFIED KNEE: Chronic | ICD-10-CM

## 2020-10-16 DIAGNOSIS — Z90.2 ACQUIRED ABSENCE OF LUNG [PART OF]: Chronic | ICD-10-CM

## 2020-10-16 DIAGNOSIS — C34.90 MALIGNANT NEOPLASM OF UNSPECIFIED PART OF UNSPECIFIED BRONCHUS OR LUNG: ICD-10-CM

## 2020-10-21 ENCOUNTER — RESULT REVIEW (OUTPATIENT)
Age: 65
End: 2020-10-21

## 2020-10-21 ENCOUNTER — APPOINTMENT (OUTPATIENT)
Dept: HEMATOLOGY ONCOLOGY | Facility: CLINIC | Age: 65
End: 2020-10-21
Payer: COMMERCIAL

## 2020-10-21 ENCOUNTER — LABORATORY RESULT (OUTPATIENT)
Age: 65
End: 2020-10-21

## 2020-10-21 ENCOUNTER — APPOINTMENT (OUTPATIENT)
Dept: INFUSION THERAPY | Facility: HOSPITAL | Age: 65
End: 2020-10-21

## 2020-10-21 VITALS
DIASTOLIC BLOOD PRESSURE: 95 MMHG | RESPIRATION RATE: 16 BRPM | BODY MASS INDEX: 39.25 KG/M2 | OXYGEN SATURATION: 95 % | HEART RATE: 84 BPM | WEIGHT: 221.56 LBS | TEMPERATURE: 98.1 F | SYSTOLIC BLOOD PRESSURE: 165 MMHG

## 2020-10-21 LAB
BASOPHILS # BLD AUTO: 0.02 K/UL — SIGNIFICANT CHANGE UP (ref 0–0.2)
BASOPHILS NFR BLD AUTO: 0.4 % — SIGNIFICANT CHANGE UP (ref 0–2)
EOSINOPHIL # BLD AUTO: 0.18 K/UL — SIGNIFICANT CHANGE UP (ref 0–0.5)
EOSINOPHIL NFR BLD AUTO: 3.4 % — SIGNIFICANT CHANGE UP (ref 0–6)
HCT VFR BLD CALC: 37.7 % — LOW (ref 39–50)
HGB BLD-MCNC: 11.9 G/DL — LOW (ref 13–17)
IMM GRANULOCYTES NFR BLD AUTO: 0.4 % — SIGNIFICANT CHANGE UP (ref 0–1.5)
LYMPHOCYTES # BLD AUTO: 0.9 K/UL — LOW (ref 1–3.3)
LYMPHOCYTES # BLD AUTO: 16.9 % — SIGNIFICANT CHANGE UP (ref 13–44)
MCHC RBC-ENTMCNC: 28.5 PG — SIGNIFICANT CHANGE UP (ref 27–34)
MCHC RBC-ENTMCNC: 31.6 G/DL — LOW (ref 32–36)
MCV RBC AUTO: 90.2 FL — SIGNIFICANT CHANGE UP (ref 80–100)
MONOCYTES # BLD AUTO: 0.63 K/UL — SIGNIFICANT CHANGE UP (ref 0–0.9)
MONOCYTES NFR BLD AUTO: 11.8 % — SIGNIFICANT CHANGE UP (ref 2–14)
NEUTROPHILS # BLD AUTO: 3.59 K/UL — SIGNIFICANT CHANGE UP (ref 1.8–7.4)
NEUTROPHILS NFR BLD AUTO: 67.1 % — SIGNIFICANT CHANGE UP (ref 43–77)
NRBC # BLD: 0 /100 WBCS — SIGNIFICANT CHANGE UP (ref 0–0)
PLATELET # BLD AUTO: 229 K/UL — SIGNIFICANT CHANGE UP (ref 150–400)
RBC # BLD: 4.18 M/UL — LOW (ref 4.2–5.8)
RBC # FLD: 15.8 % — HIGH (ref 10.3–14.5)
WBC # BLD: 5.34 K/UL — SIGNIFICANT CHANGE UP (ref 3.8–10.5)
WBC # FLD AUTO: 5.34 K/UL — SIGNIFICANT CHANGE UP (ref 3.8–10.5)

## 2020-10-21 PROCEDURE — 99214 OFFICE O/P EST MOD 30 MIN: CPT

## 2020-10-21 PROCEDURE — 99072 ADDL SUPL MATRL&STAF TM PHE: CPT

## 2020-10-22 ENCOUNTER — APPOINTMENT (OUTPATIENT)
Dept: RADIATION ONCOLOGY | Facility: CLINIC | Age: 65
End: 2020-10-22
Payer: COMMERCIAL

## 2020-10-22 VITALS
TEMPERATURE: 91.5 F | WEIGHT: 222.99 LBS | SYSTOLIC BLOOD PRESSURE: 156 MMHG | DIASTOLIC BLOOD PRESSURE: 96 MMHG | HEART RATE: 87 BPM | OXYGEN SATURATION: 96 % | BODY MASS INDEX: 39.5 KG/M2 | RESPIRATION RATE: 15 BRPM

## 2020-10-22 DIAGNOSIS — R11.2 NAUSEA WITH VOMITING, UNSPECIFIED: ICD-10-CM

## 2020-10-22 DIAGNOSIS — Z51.11 ENCOUNTER FOR ANTINEOPLASTIC CHEMOTHERAPY: ICD-10-CM

## 2020-10-22 PROCEDURE — 99024 POSTOP FOLLOW-UP VISIT: CPT

## 2020-10-22 RX ORDER — FLUTICASONE FUROATE 200 UG/1
200 POWDER RESPIRATORY (INHALATION)
Qty: 3 | Refills: 1 | Status: ACTIVE | COMMUNITY
Start: 2020-10-22 | End: 1900-01-01

## 2020-10-22 RX ORDER — ERGOCALCIFEROL 1.25 MG/1
1.25 MG CAPSULE, LIQUID FILLED ORAL
Refills: 0 | Status: DISCONTINUED | COMMUNITY
End: 2020-10-22

## 2020-10-22 RX ORDER — UMECLIDINIUM BROMIDE AND VILANTEROL TRIFENATATE 62.5; 25 UG/1; UG/1
62.5-25 POWDER RESPIRATORY (INHALATION) DAILY
Qty: 3 | Refills: 1 | Status: ACTIVE | COMMUNITY
Start: 2020-10-22 | End: 1900-01-01

## 2020-10-22 RX ORDER — BENZONATATE 100 MG/1
100 CAPSULE ORAL EVERY 8 HOURS
Qty: 180 | Refills: 0 | Status: DISCONTINUED | COMMUNITY
Start: 2020-06-24 | End: 2020-10-22

## 2020-10-22 RX ORDER — FLUTICASONE FUROATE, UMECLIDINIUM BROMIDE AND VILANTEROL TRIFENATATE 100; 62.5; 25 UG/1; UG/1; UG/1
100-62.5-25 POWDER RESPIRATORY (INHALATION)
Qty: 3 | Refills: 1 | Status: DISCONTINUED | COMMUNITY
Start: 2020-10-12 | End: 2020-10-22

## 2020-10-22 RX ORDER — METOCLOPRAMIDE 10 MG/1
10 TABLET ORAL
Qty: 60 | Refills: 5 | Status: DISCONTINUED | COMMUNITY
Start: 2020-06-23 | End: 2020-10-22

## 2020-10-22 RX ORDER — LIDOCAINE AND PRILOCAINE 25; 25 MG/G; MG/G
2.5-2.5 CREAM TOPICAL
Qty: 1 | Refills: 3 | Status: DISCONTINUED | COMMUNITY
Start: 2020-06-23 | End: 2020-10-22

## 2020-10-22 RX ORDER — CEFADROXIL 500 MG/1
500 CAPSULE ORAL
Qty: 28 | Refills: 0 | Status: DISCONTINUED | COMMUNITY
Start: 2020-07-02 | End: 2020-10-22

## 2020-10-22 RX ORDER — BISOPROLOL FUMARATE 5 MG/1
5 TABLET, FILM COATED ORAL
Qty: 30 | Refills: 0 | Status: ACTIVE | COMMUNITY
Start: 2020-06-19

## 2020-10-22 NOTE — VITALS
[Maximal Pain Intensity: 5/10] : 5/10 [Pain Description/Quality: ___] : Pain description/quality: [unfilled] [Pain Duration: ___] : Pain duration: [unfilled] [Pain Location: ___] : Pain Location: [unfilled] [90: Able to carry normal activity; minor signs or symptoms of disease.] : 90: Able to carry normal activity; minor signs or symptoms of disease.

## 2020-10-29 ENCOUNTER — NON-APPOINTMENT (OUTPATIENT)
Age: 65
End: 2020-10-29

## 2020-10-29 ENCOUNTER — TRANSCRIPTION ENCOUNTER (OUTPATIENT)
Age: 65
End: 2020-10-29

## 2020-11-04 ENCOUNTER — APPOINTMENT (OUTPATIENT)
Dept: INFUSION THERAPY | Facility: HOSPITAL | Age: 65
End: 2020-11-04

## 2020-11-05 ENCOUNTER — NON-APPOINTMENT (OUTPATIENT)
Age: 65
End: 2020-11-05

## 2020-11-12 ENCOUNTER — OUTPATIENT (OUTPATIENT)
Dept: OUTPATIENT SERVICES | Facility: HOSPITAL | Age: 65
LOS: 1 days | Discharge: HOME | End: 2020-11-12

## 2020-11-12 DIAGNOSIS — Z90.2 ACQUIRED ABSENCE OF LUNG [PART OF]: Chronic | ICD-10-CM

## 2020-11-12 DIAGNOSIS — C34.90 MALIGNANT NEOPLASM OF UNSPECIFIED PART OF UNSPECIFIED BRONCHUS OR LUNG: ICD-10-CM

## 2020-11-12 DIAGNOSIS — M71.20 SYNOVIAL CYST OF POPLITEAL SPACE [BAKER], UNSPECIFIED KNEE: Chronic | ICD-10-CM

## 2020-11-15 NOTE — REVIEW OF SYSTEMS
[Negative] : Allergic/Immunologic [Dysphagia: Grade 0] : Dysphagia: Grade 0 [Esophagitis: Grade 0] : Esophagitis: Grade 0 [Cough: Grade 0] : Cough: Grade 0 [Dyspnea: Grade 0] : Dyspnea: Grade 0 [Pneumonitis: Grade 0] : Pneumonitis: Grade 0

## 2020-11-15 NOTE — HISTORY OF PRESENT ILLNESS
[FreeTextEntry1] : Mr. Enrique is a 65 year old man with localized intrathoracic hesham recurrence of lung adenocarcinoma, would be staged N2 if staged de nestor. He has a prior history of Stage I adenocarcinoma of the lung s/p resection in [facility] and 1cycle of adjuvant Cis/Taxol. CT in Feb 2020 revealed enlarged right hilar lymph node. S/P  Bronchoscopy and biopsy of the hilar LN, demonstrated NSCLC  He received IMRT to right hilum/paratracheal to a total dose of 6,000 cGy from 07/14/20 - 08/25/20.\par \par He is seen in PTE today. Continues to follow with Dr. Mascorro and is maintained on Durvalumab, and plans for next restaging scan in November.\par \par Today he is seen in post treatment visit. Primarily Cypriot speaking. Language line offered, patient opted out, instead had his daughter Kamini over the phone interpret. He report feeling well overall, with intermittent SOB, dry cough, and xerostomia especially when he wakes up in the morning. He us followed by Pulmonologist and is going to be worked up for sleep apnea.

## 2020-11-15 NOTE — PHYSICAL EXAM
[Normal] : no respiratory distress, lungs were clear to auscultation bilaterally [de-identified] : speaks in full sentences [de-identified] : mild erythemaous, scaly papular rash on the back, with c/o itchiness.

## 2020-11-15 NOTE — DISEASE MANAGEMENT
[Clinical] : TNM Stage: c [FreeTextEntry4] : recurrent [TTNM] : - [NTNM] : - [MTNM] : - [N/A] : Currently not applicable

## 2020-11-16 ENCOUNTER — APPOINTMENT (OUTPATIENT)
Dept: CT IMAGING | Facility: IMAGING CENTER | Age: 65
End: 2020-11-16
Payer: COMMERCIAL

## 2020-11-16 ENCOUNTER — OUTPATIENT (OUTPATIENT)
Dept: OUTPATIENT SERVICES | Facility: HOSPITAL | Age: 65
LOS: 1 days | End: 2020-11-16
Payer: COMMERCIAL

## 2020-11-16 DIAGNOSIS — Z00.8 ENCOUNTER FOR OTHER GENERAL EXAMINATION: ICD-10-CM

## 2020-11-16 DIAGNOSIS — M71.20 SYNOVIAL CYST OF POPLITEAL SPACE [BAKER], UNSPECIFIED KNEE: Chronic | ICD-10-CM

## 2020-11-16 DIAGNOSIS — Z90.2 ACQUIRED ABSENCE OF LUNG [PART OF]: Chronic | ICD-10-CM

## 2020-11-16 PROCEDURE — 71260 CT THORAX DX C+: CPT | Mod: 26

## 2020-11-16 PROCEDURE — 71260 CT THORAX DX C+: CPT

## 2020-11-18 ENCOUNTER — RESULT REVIEW (OUTPATIENT)
Age: 65
End: 2020-11-18

## 2020-11-18 ENCOUNTER — APPOINTMENT (OUTPATIENT)
Dept: INFUSION THERAPY | Facility: HOSPITAL | Age: 65
End: 2020-11-18

## 2020-11-18 ENCOUNTER — APPOINTMENT (OUTPATIENT)
Dept: HEMATOLOGY ONCOLOGY | Facility: CLINIC | Age: 65
End: 2020-11-18
Payer: COMMERCIAL

## 2020-11-18 ENCOUNTER — LABORATORY RESULT (OUTPATIENT)
Age: 65
End: 2020-11-18

## 2020-11-18 VITALS
DIASTOLIC BLOOD PRESSURE: 80 MMHG | RESPIRATION RATE: 14 BRPM | SYSTOLIC BLOOD PRESSURE: 159 MMHG | TEMPERATURE: 98 F | OXYGEN SATURATION: 99 % | WEIGHT: 226.86 LBS | BODY MASS INDEX: 40.19 KG/M2 | HEART RATE: 87 BPM

## 2020-11-18 DIAGNOSIS — R97.20 ELEVATED PROSTATE, SPECIFIC ANTIGEN [PSA]: ICD-10-CM

## 2020-11-18 DIAGNOSIS — E79.0 HYPERURICEMIA W/OUT SIGNS OF INFLAMMATORY ARTHRITIS AND TOPHACEOUS DISEASE: ICD-10-CM

## 2020-11-18 LAB
BASOPHILS # BLD AUTO: 0.04 K/UL — SIGNIFICANT CHANGE UP (ref 0–0.2)
BASOPHILS NFR BLD AUTO: 0.5 % — SIGNIFICANT CHANGE UP (ref 0–2)
EOSINOPHIL # BLD AUTO: 0.09 K/UL — SIGNIFICANT CHANGE UP (ref 0–0.5)
EOSINOPHIL NFR BLD AUTO: 1.1 % — SIGNIFICANT CHANGE UP (ref 0–6)
HCT VFR BLD CALC: 40.2 % — SIGNIFICANT CHANGE UP (ref 39–50)
HGB BLD-MCNC: 12.7 G/DL — LOW (ref 13–17)
IMM GRANULOCYTES NFR BLD AUTO: 0.7 % — SIGNIFICANT CHANGE UP (ref 0–1.5)
LYMPHOCYTES # BLD AUTO: 1.01 K/UL — SIGNIFICANT CHANGE UP (ref 1–3.3)
LYMPHOCYTES # BLD AUTO: 11.9 % — LOW (ref 13–44)
MCHC RBC-ENTMCNC: 27.8 PG — SIGNIFICANT CHANGE UP (ref 27–34)
MCHC RBC-ENTMCNC: 31.6 G/DL — LOW (ref 32–36)
MCV RBC AUTO: 88 FL — SIGNIFICANT CHANGE UP (ref 80–100)
MONOCYTES # BLD AUTO: 0.65 K/UL — SIGNIFICANT CHANGE UP (ref 0–0.9)
MONOCYTES NFR BLD AUTO: 7.6 % — SIGNIFICANT CHANGE UP (ref 2–14)
NEUTROPHILS # BLD AUTO: 6.67 K/UL — SIGNIFICANT CHANGE UP (ref 1.8–7.4)
NEUTROPHILS NFR BLD AUTO: 78.2 % — HIGH (ref 43–77)
NRBC # BLD: 0 /100 WBCS — SIGNIFICANT CHANGE UP (ref 0–0)
PLATELET # BLD AUTO: 225 K/UL — SIGNIFICANT CHANGE UP (ref 150–400)
RBC # BLD: 4.57 M/UL — SIGNIFICANT CHANGE UP (ref 4.2–5.8)
RBC # FLD: 14.6 % — HIGH (ref 10.3–14.5)
WBC # BLD: 8.52 K/UL — SIGNIFICANT CHANGE UP (ref 3.8–10.5)
WBC # FLD AUTO: 8.52 K/UL — SIGNIFICANT CHANGE UP (ref 3.8–10.5)

## 2020-11-18 PROCEDURE — 99215 OFFICE O/P EST HI 40 MIN: CPT

## 2020-11-19 DIAGNOSIS — Z51.11 ENCOUNTER FOR ANTINEOPLASTIC CHEMOTHERAPY: ICD-10-CM

## 2020-11-19 DIAGNOSIS — R11.2 NAUSEA WITH VOMITING, UNSPECIFIED: ICD-10-CM

## 2020-12-02 ENCOUNTER — APPOINTMENT (OUTPATIENT)
Dept: INFUSION THERAPY | Facility: HOSPITAL | Age: 65
End: 2020-12-02

## 2020-12-08 ENCOUNTER — LABORATORY RESULT (OUTPATIENT)
Age: 65
End: 2020-12-08

## 2020-12-09 ENCOUNTER — APPOINTMENT (OUTPATIENT)
Dept: DERMATOLOGY | Facility: CLINIC | Age: 65
End: 2020-12-09
Payer: COMMERCIAL

## 2020-12-09 VITALS — WEIGHT: 223 LBS | HEIGHT: 64 IN | BODY MASS INDEX: 38.07 KG/M2

## 2020-12-09 DIAGNOSIS — D48.5 NEOPLASM OF UNCERTAIN BEHAVIOR OF SKIN: ICD-10-CM

## 2020-12-09 PROCEDURE — 99072 ADDL SUPL MATRL&STAF TM PHE: CPT

## 2020-12-09 PROCEDURE — 11102 TANGNTL BX SKIN SINGLE LES: CPT

## 2020-12-09 PROCEDURE — 99203 OFFICE O/P NEW LOW 30 MIN: CPT | Mod: 25

## 2020-12-09 RX ORDER — TRIAMCINOLONE ACETONIDE 1 MG/G
0.1 OINTMENT TOPICAL
Qty: 1 | Refills: 2 | Status: ACTIVE | COMMUNITY
Start: 2020-12-09 | End: 1900-01-01

## 2020-12-11 ENCOUNTER — APPOINTMENT (OUTPATIENT)
Dept: PULMONOLOGY | Facility: CLINIC | Age: 65
End: 2020-12-11
Payer: COMMERCIAL

## 2020-12-11 ENCOUNTER — APPOINTMENT (OUTPATIENT)
Dept: INFUSION THERAPY | Facility: HOSPITAL | Age: 65
End: 2020-12-11

## 2020-12-11 ENCOUNTER — OUTPATIENT (OUTPATIENT)
Dept: OUTPATIENT SERVICES | Facility: HOSPITAL | Age: 65
LOS: 1 days | Discharge: HOME | End: 2020-12-11

## 2020-12-11 VITALS
DIASTOLIC BLOOD PRESSURE: 84 MMHG | RESPIRATION RATE: 16 BRPM | HEART RATE: 74 BPM | WEIGHT: 228 LBS | SYSTOLIC BLOOD PRESSURE: 126 MMHG | HEIGHT: 64 IN | TEMPERATURE: 97.3 F | OXYGEN SATURATION: 98 % | BODY MASS INDEX: 38.93 KG/M2

## 2020-12-11 DIAGNOSIS — U07.1 COVID-19: ICD-10-CM

## 2020-12-11 DIAGNOSIS — Z87.2 PERSONAL HISTORY OF DISEASES OF THE SKIN AND SUBCUTANEOUS TISSUE: ICD-10-CM

## 2020-12-11 DIAGNOSIS — Z78.9 OTHER SPECIFIED HEALTH STATUS: ICD-10-CM

## 2020-12-11 DIAGNOSIS — Z90.2 ACQUIRED ABSENCE OF LUNG [PART OF]: Chronic | ICD-10-CM

## 2020-12-11 DIAGNOSIS — C34.90 MALIGNANT NEOPLASM OF UNSPECIFIED PART OF UNSPECIFIED BRONCHUS OR LUNG: ICD-10-CM

## 2020-12-11 DIAGNOSIS — R09.82 POSTNASAL DRIP: ICD-10-CM

## 2020-12-11 DIAGNOSIS — M71.20 SYNOVIAL CYST OF POPLITEAL SPACE [BAKER], UNSPECIFIED KNEE: Chronic | ICD-10-CM

## 2020-12-11 DIAGNOSIS — R06.83 SNORING: ICD-10-CM

## 2020-12-11 DIAGNOSIS — J44.9 CHRONIC OBSTRUCTIVE PULMONARY DISEASE, UNSPECIFIED: ICD-10-CM

## 2020-12-11 DIAGNOSIS — R06.02 SHORTNESS OF BREATH: ICD-10-CM

## 2020-12-11 PROCEDURE — 99214 OFFICE O/P EST MOD 30 MIN: CPT

## 2020-12-11 PROCEDURE — 99072 ADDL SUPL MATRL&STAF TM PHE: CPT

## 2020-12-11 RX ORDER — AMOXICILLIN AND CLAVULANATE POTASSIUM 875; 125 MG/1; MG/1
875-125 TABLET, COATED ORAL
Qty: 20 | Refills: 0 | Status: DISCONTINUED | COMMUNITY
Start: 2020-11-09

## 2020-12-11 RX ORDER — CLOBETASOL PROPIONATE 0.5 MG/G
0.05 OINTMENT TOPICAL
Qty: 30 | Refills: 0 | Status: DISCONTINUED | COMMUNITY
Start: 2020-11-09

## 2020-12-11 RX ORDER — ALLOPURINOL 100 MG/1
100 TABLET ORAL
Qty: 30 | Refills: 0 | Status: ACTIVE | COMMUNITY
Start: 2020-11-30

## 2020-12-11 RX ORDER — NEBULIZER AND COMPRESSOR
EACH MISCELLANEOUS
Qty: 1 | Refills: 0 | Status: ACTIVE | COMMUNITY
Start: 2020-07-30

## 2020-12-11 RX ORDER — ALBUTEROL SULFATE 90 UG/1
108 (90 BASE) AEROSOL, METERED RESPIRATORY (INHALATION) EVERY 6 HOURS
Qty: 1 | Refills: 0 | Status: ACTIVE | COMMUNITY
Start: 2020-12-11 | End: 1900-01-01

## 2020-12-11 NOTE — ADDENDUM
[FreeTextEntry1] : Documented by Kenroy Cano acting as a scribe for Dr. Mateus Finch on 12/11/2020 \par \par All medical record entries made by the Scribe were at my, Dr. Mateus Finch's, direction and personally dictated by me on 12/11/2020 . I have reviewed the chart and agree that the record accurately reflects my personal performance of the history, physical exam, assessment and plan. I have also personally directed, reviewed, and agree with the discharge instructions.

## 2020-12-11 NOTE — HISTORY OF PRESENT ILLNESS
[TextBox_4] : Mr. DELGADO is a 65 year old male presenting to the office today for f/u pulmonary evaluation. His chief complaint is heavy breathing. \par -he is feeling better \par -his cough has improved. \par -energy level is good. \par -reports experiencing heavy breathing. \par -weight is stable.\par -he is not working and sleeps whenever he is in the mood. \par -he sometimes sleeps during the day and sometimes during the night.\par -he does snore. \par -denies palpitations. \par -reports some wheezing but the wheezing has improved. \par -reports that the hiccups are better now.\par -he is on immunotherapy and is tolerating it. \par -he is still taking the gout medication.\par -his appetite is good. \par -he notes that His bowels are regular. \par -he had completed the allergies blood work and would like to discuss the results (results were discussed). \par \par -He denies any chest pain, chest pressure, diarrhea, constipation, dysphagia, dizziness, sour taste in the mouth, leg swelling, leg pain, itchy eyes, itchy ears, heartburn, reflux.

## 2020-12-11 NOTE — ASSESSMENT
[FreeTextEntry1] : Mr. DELGADO  is a 65 year old male with a history of  40 pack year smoker, mycosis fungoids lymphoma, non small cell carcinoma of lung 2019, s/p port, right middle lobectomy, finished chemo, radiation therapy currently on immunotherapy, HTN, GERD, s/p COVID-19 Apri 2020, who now comes to the office for a f/u pulmonary evaluation for SOB \par \par His shortness of breath is multifactorial due to:\par -poor mechanics of breathing \par -out of shape / overweight\par -pulmonary disease\par    - restrictive dysfunction (s/p lobectomy)\par    - COPD \par -?cardiac disease \par     - ?Anemia \par \par \par problem 1: COPD /  restrictive dysfunction (s/p lobectomy)\par - Add Trelegy 1 puff QD \par -Add Ventolin 2 inhalations up to QID \par -Inhaler technique reviewed as well as oral hygiene techniques reviewed with patient. Avoidance of cold air, extremes of temperature, rescue inhaler should be used before exercise. Order of medication reviewed with patient. Recommended use of a cool mist humidifier in the bedroom. \par -COPD is a progressive disease and although it can’t be cured , appropriate management can slow its progression, reduce frequency and severity of exacerbations, and improve symptoms and the patient quality of life. Hospitalizations are the greatest contributor to the total COPD costs and account for up to 87% of total COPD related costs. Exacerbations are the main cause of admissions and subsequently account for the 40-75% of COPD costs. Inhaled maintenance therapy reduces the incidence of exacerbations in patients with stable COPD. Incorrect inhaler use and nonadherence are major obstacles to achieving COPD treatment goals. Many COPD patients have challenges (impaired inhalation, limited dexterity, reduced cognition: that limit their ability to correctly use their COPD treatment devices resulting in reduced symptom control. Of most importance is smoking cessation and early intervention with respiratory illnesses and contemplation for pulmonary rehab to enhance quality of life.\par \par \par \par problem 2: Allergies / Sinus \par -continue Olopatadine 0.6% 1 sniff BID \par - s/p blood work: asthma profile, food IgE panel, eosinophil level, IgE level, Vitamin D level (WNL)\par - Environmental measures for allergies were encouraged including mattress and pillow cover, air purifier, and environmental controls. \par \par \par Problem 3: ?Anemia \par - Get blood work: Iron Studies \par \par \par Problem 4: GERD\par -continue Reglan 5 mg pre-meal, QHS\par -continue Pantoprazole 40 mg QAM  \par -Rule of 2s: avoid eating too much, eating too late, eating too spicy, eating two hours before bed.\par -Things to avoid including overeating, spicy foods, tight clothing, eating within three hours of bed, this list is not all inclusive. \par -For treatment of reflux, possible options discussed including diet control, H2 blockers, PPIs, as well as coating motility agents discussed as treatment options. Timing of meals and proximity of last meal to sleep were discussed. If symptoms persist, a formal gastrointestinal evaluation is needed.\par \par \par Problem 5: Lung CA (s/p chemotherpay / Radiation)\par -  on immunotherapy \par \par \par Problem 6: r/o ?OSAS (risk factors: metabolism, snoring, neck size: 18, memory / concentration) \par - get home sleep study \par Sleep apnea is associated with adverse clinical consequences which can affect most organ systems.  Cardiovascular disease risk includes arrhythmias, atrial fibrillation, hypertension, coronary artery disease, and stroke. Metabolic disorders include diabetes type 2, non-alcoholic fatty liver disease. Mood disorder especially depression; and cognitive decline especially in the elderly. Associations with  chronic reflux/Hsu’s esophagus some but not all inclusive. \par -Reasons  include arousal consistent with hypopnea; respiratory events most prominent in REM sleep or supine position; therefore sleep staging and body position are important for accurate diagnosis and estimation of AHI.\par \par Problem 7: s/p COVID-19 infection s/p April 2020 \par - no residual from COVID on CXR \par Immune Support Recommendations:\par -OTC Vitamin C 500mg BID \par -OTC Quercetin 250-500mg BID \par -OTC Zinc 75-100mg per day \par -OTC Melatonin 1or 2mg a night \par -OTC Vitamin D 1-4000mg per day \par -OTC Tonic Water 8oz per day\par  \par \par problem 8: cardiac disease\par -recommended to continue to follow up with Cardiologist \par \par problem 9 : poor breathing mechanics\par -Proper breathing techniques were reviewed with an emphasis of exhalation. Patient instructed to breath in for 1 second and out for four seconds. Patient was encouraged to not talk while walking. \par \par problem  10: out of shape / overweight\par -Weight loss, exercise, and diet control were discussed and are highly encouraged. Treatment options were given such as, aqua therapy, and contacting a nutritionist. Recommended to use the elliptical, stationary bike, less use of treadmill. \par \par problem 11 : health maintenance \par - recommended humidifier on\par -recommended yearly flu shot after October 15 (oncologist) \par -recommended strep pneumonia vaccines: Prevnar-13 vaccine, followed by Pneumo vaccine 23 one year following after 65 years old. \par -recommended early intervention for Upper Respiratory Infections (URIs)\par -recommended regular osteoporosis evaluations\par -recommended early dermatological evaluations\par -recommended after the age of 50 to the age of 70, colonoscopy every 5 years\par \par F/U in 6-8 weeks.\par He is encouraged to call with any changes, concerns, or questions\par \par

## 2020-12-11 NOTE — PHYSICAL EXAM
[No Acute Distress] : no acute distress [Normal Oropharynx] : normal oropharynx [III] : Mallampati Class: III [Normal Appearance] : normal appearance [No Neck Mass] : no neck mass [Normal Rate/Rhythm] : normal rate/rhythm [Normal S1, S2] : normal s1, s2 [No Murmurs] : no murmurs [No Resp Distress] : no resp distress [No Abnormalities] : no abnormalities [Benign] : benign [Normal Gait] : normal gait [No Clubbing] : no clubbing [No Cyanosis] : no cyanosis [No Edema] : no edema [FROM] : FROM [Normal Color/ Pigmentation] : normal color/ pigmentation [No Focal Deficits] : no focal deficits [Oriented x3] : oriented x3 [Normal Affect] : normal affect [TextBox_68] : I:E 1:3, mild forced expiratory wheeze

## 2020-12-11 NOTE — REASON FOR VISIT
[Follow-Up] : a follow-up visit [TextBox_44] : SOB, COPD / restrictive dysfunction, s/p lobectomy, GERD, ?allergies, ?anemia, ?OSAS, s/p COVID-19 infection

## 2020-12-16 ENCOUNTER — LABORATORY RESULT (OUTPATIENT)
Age: 65
End: 2020-12-16

## 2020-12-16 ENCOUNTER — APPOINTMENT (OUTPATIENT)
Dept: INFUSION THERAPY | Facility: HOSPITAL | Age: 65
End: 2020-12-16

## 2020-12-16 ENCOUNTER — RESULT REVIEW (OUTPATIENT)
Age: 65
End: 2020-12-16

## 2020-12-16 ENCOUNTER — APPOINTMENT (OUTPATIENT)
Dept: HEMATOLOGY ONCOLOGY | Facility: CLINIC | Age: 65
End: 2020-12-16
Payer: COMMERCIAL

## 2020-12-16 VITALS
RESPIRATION RATE: 17 BRPM | SYSTOLIC BLOOD PRESSURE: 146 MMHG | TEMPERATURE: 97.8 F | DIASTOLIC BLOOD PRESSURE: 81 MMHG | BODY MASS INDEX: 39.33 KG/M2 | HEIGHT: 64.02 IN | WEIGHT: 230.38 LBS | OXYGEN SATURATION: 98 % | HEART RATE: 94 BPM

## 2020-12-16 DIAGNOSIS — C84.00 MYCOSIS FUNGOIDES, UNSPECIFIED SITE: ICD-10-CM

## 2020-12-16 LAB
BASOPHILS # BLD AUTO: 0.02 K/UL — SIGNIFICANT CHANGE UP (ref 0–0.2)
BASOPHILS NFR BLD AUTO: 0.3 % — SIGNIFICANT CHANGE UP (ref 0–2)
EOSINOPHIL # BLD AUTO: 0.12 K/UL — SIGNIFICANT CHANGE UP (ref 0–0.5)
EOSINOPHIL NFR BLD AUTO: 1.8 % — SIGNIFICANT CHANGE UP (ref 0–6)
HCT VFR BLD CALC: 43.1 % — SIGNIFICANT CHANGE UP (ref 39–50)
HGB BLD-MCNC: 13.2 G/DL — SIGNIFICANT CHANGE UP (ref 13–17)
IMM GRANULOCYTES NFR BLD AUTO: 0.5 % — SIGNIFICANT CHANGE UP (ref 0–1.5)
LYMPHOCYTES # BLD AUTO: 0.83 K/UL — LOW (ref 1–3.3)
LYMPHOCYTES # BLD AUTO: 12.6 % — LOW (ref 13–44)
MCHC RBC-ENTMCNC: 26.7 PG — LOW (ref 27–34)
MCHC RBC-ENTMCNC: 30.6 G/DL — LOW (ref 32–36)
MCV RBC AUTO: 87.2 FL — SIGNIFICANT CHANGE UP (ref 80–100)
MONOCYTES # BLD AUTO: 0.54 K/UL — SIGNIFICANT CHANGE UP (ref 0–0.9)
MONOCYTES NFR BLD AUTO: 8.2 % — SIGNIFICANT CHANGE UP (ref 2–14)
NEUTROPHILS # BLD AUTO: 5.04 K/UL — SIGNIFICANT CHANGE UP (ref 1.8–7.4)
NEUTROPHILS NFR BLD AUTO: 76.6 % — SIGNIFICANT CHANGE UP (ref 43–77)
NRBC # BLD: 0 /100 WBCS — SIGNIFICANT CHANGE UP (ref 0–0)
PLATELET # BLD AUTO: 212 K/UL — SIGNIFICANT CHANGE UP (ref 150–400)
RBC # BLD: 4.94 M/UL — SIGNIFICANT CHANGE UP (ref 4.2–5.8)
RBC # FLD: 14.6 % — HIGH (ref 10.3–14.5)
SARS-COV-2 N GENE NPH QL NAA+PROBE: NOT DETECTED
WBC # BLD: 6.58 K/UL — SIGNIFICANT CHANGE UP (ref 3.8–10.5)
WBC # FLD AUTO: 6.58 K/UL — SIGNIFICANT CHANGE UP (ref 3.8–10.5)

## 2020-12-16 PROCEDURE — 99072 ADDL SUPL MATRL&STAF TM PHE: CPT

## 2020-12-16 PROCEDURE — 99213 OFFICE O/P EST LOW 20 MIN: CPT

## 2020-12-17 DIAGNOSIS — R11.2 NAUSEA WITH VOMITING, UNSPECIFIED: ICD-10-CM

## 2020-12-17 DIAGNOSIS — Z51.11 ENCOUNTER FOR ANTINEOPLASTIC CHEMOTHERAPY: ICD-10-CM

## 2020-12-17 PROBLEM — C84.00: Status: ACTIVE | Noted: 2020-02-12

## 2020-12-18 ENCOUNTER — NON-APPOINTMENT (OUTPATIENT)
Age: 65
End: 2020-12-18

## 2020-12-23 PROBLEM — Z87.440 HISTORY OF URINARY TRACT INFECTION: Status: RESOLVED | Noted: 2020-02-26 | Resolved: 2020-12-23

## 2020-12-30 ENCOUNTER — APPOINTMENT (OUTPATIENT)
Dept: INFUSION THERAPY | Facility: HOSPITAL | Age: 65
End: 2020-12-30

## 2020-12-31 ENCOUNTER — NON-APPOINTMENT (OUTPATIENT)
Age: 65
End: 2020-12-31

## 2021-01-09 ENCOUNTER — OUTPATIENT (OUTPATIENT)
Dept: OUTPATIENT SERVICES | Facility: HOSPITAL | Age: 66
LOS: 1 days | Discharge: HOME | End: 2021-01-09

## 2021-01-09 DIAGNOSIS — C34.90 MALIGNANT NEOPLASM OF UNSPECIFIED PART OF UNSPECIFIED BRONCHUS OR LUNG: ICD-10-CM

## 2021-01-09 DIAGNOSIS — M71.20 SYNOVIAL CYST OF POPLITEAL SPACE [BAKER], UNSPECIFIED KNEE: Chronic | ICD-10-CM

## 2021-01-09 DIAGNOSIS — Z90.2 ACQUIRED ABSENCE OF LUNG [PART OF]: Chronic | ICD-10-CM

## 2021-01-11 ENCOUNTER — APPOINTMENT (OUTPATIENT)
Dept: INFUSION THERAPY | Facility: HOSPITAL | Age: 66
End: 2021-01-11

## 2021-01-11 ENCOUNTER — LABORATORY RESULT (OUTPATIENT)
Age: 66
End: 2021-01-11

## 2021-01-13 ENCOUNTER — RESULT REVIEW (OUTPATIENT)
Age: 66
End: 2021-01-13

## 2021-01-13 ENCOUNTER — LABORATORY RESULT (OUTPATIENT)
Age: 66
End: 2021-01-13

## 2021-01-13 ENCOUNTER — APPOINTMENT (OUTPATIENT)
Dept: INFUSION THERAPY | Facility: HOSPITAL | Age: 66
End: 2021-01-13

## 2021-01-13 ENCOUNTER — APPOINTMENT (OUTPATIENT)
Dept: HEMATOLOGY ONCOLOGY | Facility: CLINIC | Age: 66
End: 2021-01-13
Payer: MEDICAID

## 2021-01-13 VITALS
TEMPERATURE: 98 F | DIASTOLIC BLOOD PRESSURE: 84 MMHG | BODY MASS INDEX: 39.34 KG/M2 | RESPIRATION RATE: 18 BRPM | OXYGEN SATURATION: 98 % | SYSTOLIC BLOOD PRESSURE: 167 MMHG | HEART RATE: 100 BPM | WEIGHT: 229.28 LBS

## 2021-01-13 LAB
BASOPHILS # BLD AUTO: 0.03 K/UL — SIGNIFICANT CHANGE UP (ref 0–0.2)
BASOPHILS NFR BLD AUTO: 0.4 % — SIGNIFICANT CHANGE UP (ref 0–2)
EOSINOPHIL # BLD AUTO: 0.09 K/UL — SIGNIFICANT CHANGE UP (ref 0–0.5)
EOSINOPHIL NFR BLD AUTO: 1.2 % — SIGNIFICANT CHANGE UP (ref 0–6)
HCT VFR BLD CALC: 45.4 % — SIGNIFICANT CHANGE UP (ref 39–50)
HGB BLD-MCNC: 13.7 G/DL — SIGNIFICANT CHANGE UP (ref 13–17)
IMM GRANULOCYTES NFR BLD AUTO: 0.5 % — SIGNIFICANT CHANGE UP (ref 0–1.5)
LYMPHOCYTES # BLD AUTO: 0.81 K/UL — LOW (ref 1–3.3)
LYMPHOCYTES # BLD AUTO: 10.5 % — LOW (ref 13–44)
MCHC RBC-ENTMCNC: 25.4 PG — LOW (ref 27–34)
MCHC RBC-ENTMCNC: 30.2 G/DL — LOW (ref 32–36)
MCV RBC AUTO: 84.1 FL — SIGNIFICANT CHANGE UP (ref 80–100)
MONOCYTES # BLD AUTO: 0.55 K/UL — SIGNIFICANT CHANGE UP (ref 0–0.9)
MONOCYTES NFR BLD AUTO: 7.1 % — SIGNIFICANT CHANGE UP (ref 2–14)
NEUTROPHILS # BLD AUTO: 6.22 K/UL — SIGNIFICANT CHANGE UP (ref 1.8–7.4)
NEUTROPHILS NFR BLD AUTO: 80.3 % — HIGH (ref 43–77)
NRBC # BLD: 0 /100 WBCS — SIGNIFICANT CHANGE UP (ref 0–0)
PLATELET # BLD AUTO: 206 K/UL — SIGNIFICANT CHANGE UP (ref 150–400)
RBC # BLD: 5.4 M/UL — SIGNIFICANT CHANGE UP (ref 4.2–5.8)
RBC # FLD: 15.2 % — HIGH (ref 10.3–14.5)
WBC # BLD: 7.74 K/UL — SIGNIFICANT CHANGE UP (ref 3.8–10.5)
WBC # FLD AUTO: 7.74 K/UL — SIGNIFICANT CHANGE UP (ref 3.8–10.5)

## 2021-01-13 PROCEDURE — 99072 ADDL SUPL MATRL&STAF TM PHE: CPT

## 2021-01-13 PROCEDURE — 99214 OFFICE O/P EST MOD 30 MIN: CPT

## 2021-01-14 DIAGNOSIS — Z51.11 ENCOUNTER FOR ANTINEOPLASTIC CHEMOTHERAPY: ICD-10-CM

## 2021-01-14 DIAGNOSIS — R11.2 NAUSEA WITH VOMITING, UNSPECIFIED: ICD-10-CM

## 2021-01-27 ENCOUNTER — APPOINTMENT (OUTPATIENT)
Dept: INFUSION THERAPY | Facility: HOSPITAL | Age: 66
End: 2021-01-27

## 2021-01-28 ENCOUNTER — NON-APPOINTMENT (OUTPATIENT)
Age: 66
End: 2021-01-28

## 2021-02-01 ENCOUNTER — APPOINTMENT (OUTPATIENT)
Dept: DERMATOLOGY | Facility: CLINIC | Age: 66
End: 2021-02-01

## 2021-02-05 ENCOUNTER — OUTPATIENT (OUTPATIENT)
Dept: OUTPATIENT SERVICES | Facility: HOSPITAL | Age: 66
LOS: 1 days | Discharge: HOME | End: 2021-02-05

## 2021-02-05 DIAGNOSIS — Z90.2 ACQUIRED ABSENCE OF LUNG [PART OF]: Chronic | ICD-10-CM

## 2021-02-05 DIAGNOSIS — M71.20 SYNOVIAL CYST OF POPLITEAL SPACE [BAKER], UNSPECIFIED KNEE: Chronic | ICD-10-CM

## 2021-02-05 DIAGNOSIS — C34.90 MALIGNANT NEOPLASM OF UNSPECIFIED PART OF UNSPECIFIED BRONCHUS OR LUNG: ICD-10-CM

## 2021-02-08 ENCOUNTER — APPOINTMENT (OUTPATIENT)
Dept: CT IMAGING | Facility: IMAGING CENTER | Age: 66
End: 2021-02-08
Payer: MEDICAID

## 2021-02-08 ENCOUNTER — OUTPATIENT (OUTPATIENT)
Dept: OUTPATIENT SERVICES | Facility: HOSPITAL | Age: 66
LOS: 1 days | End: 2021-02-08
Payer: MEDICAID

## 2021-02-08 ENCOUNTER — APPOINTMENT (OUTPATIENT)
Dept: HEMATOLOGY ONCOLOGY | Facility: CLINIC | Age: 66
End: 2021-02-08

## 2021-02-08 ENCOUNTER — LABORATORY RESULT (OUTPATIENT)
Age: 66
End: 2021-02-08

## 2021-02-08 DIAGNOSIS — Z00.8 ENCOUNTER FOR OTHER GENERAL EXAMINATION: ICD-10-CM

## 2021-02-08 DIAGNOSIS — M71.20 SYNOVIAL CYST OF POPLITEAL SPACE [BAKER], UNSPECIFIED KNEE: Chronic | ICD-10-CM

## 2021-02-08 DIAGNOSIS — Z90.2 ACQUIRED ABSENCE OF LUNG [PART OF]: Chronic | ICD-10-CM

## 2021-02-08 PROCEDURE — 71260 CT THORAX DX C+: CPT | Mod: 26

## 2021-02-08 PROCEDURE — 71260 CT THORAX DX C+: CPT

## 2021-02-10 ENCOUNTER — APPOINTMENT (OUTPATIENT)
Dept: HEMATOLOGY ONCOLOGY | Facility: CLINIC | Age: 66
End: 2021-02-10
Payer: MEDICAID

## 2021-02-10 ENCOUNTER — APPOINTMENT (OUTPATIENT)
Dept: INFUSION THERAPY | Facility: HOSPITAL | Age: 66
End: 2021-02-10

## 2021-02-10 ENCOUNTER — LABORATORY RESULT (OUTPATIENT)
Age: 66
End: 2021-02-10

## 2021-02-10 ENCOUNTER — RESULT REVIEW (OUTPATIENT)
Age: 66
End: 2021-02-10

## 2021-02-10 VITALS
RESPIRATION RATE: 18 BRPM | TEMPERATURE: 97.4 F | DIASTOLIC BLOOD PRESSURE: 82 MMHG | WEIGHT: 229.72 LBS | HEIGHT: 64.02 IN | BODY MASS INDEX: 39.22 KG/M2 | SYSTOLIC BLOOD PRESSURE: 161 MMHG | OXYGEN SATURATION: 96 % | HEART RATE: 85 BPM

## 2021-02-10 LAB
BASOPHILS # BLD AUTO: 0.03 K/UL — SIGNIFICANT CHANGE UP (ref 0–0.2)
BASOPHILS NFR BLD AUTO: 0.4 % — SIGNIFICANT CHANGE UP (ref 0–2)
EOSINOPHIL # BLD AUTO: 0.08 K/UL — SIGNIFICANT CHANGE UP (ref 0–0.5)
EOSINOPHIL NFR BLD AUTO: 1.2 % — SIGNIFICANT CHANGE UP (ref 0–6)
HCT VFR BLD CALC: 43.2 % — SIGNIFICANT CHANGE UP (ref 39–50)
HGB BLD-MCNC: 13.5 G/DL — SIGNIFICANT CHANGE UP (ref 13–17)
IMM GRANULOCYTES NFR BLD AUTO: 0.4 % — SIGNIFICANT CHANGE UP (ref 0–1.5)
LYMPHOCYTES # BLD AUTO: 0.73 K/UL — LOW (ref 1–3.3)
LYMPHOCYTES # BLD AUTO: 10.6 % — LOW (ref 13–44)
MCHC RBC-ENTMCNC: 25.7 PG — LOW (ref 27–34)
MCHC RBC-ENTMCNC: 31.3 G/DL — LOW (ref 32–36)
MCV RBC AUTO: 82.1 FL — SIGNIFICANT CHANGE UP (ref 80–100)
MONOCYTES # BLD AUTO: 0.57 K/UL — SIGNIFICANT CHANGE UP (ref 0–0.9)
MONOCYTES NFR BLD AUTO: 8.3 % — SIGNIFICANT CHANGE UP (ref 2–14)
NEUTROPHILS # BLD AUTO: 5.46 K/UL — SIGNIFICANT CHANGE UP (ref 1.8–7.4)
NEUTROPHILS NFR BLD AUTO: 79.1 % — HIGH (ref 43–77)
NRBC # BLD: 0 /100 WBCS — SIGNIFICANT CHANGE UP (ref 0–0)
PLATELET # BLD AUTO: 212 K/UL — SIGNIFICANT CHANGE UP (ref 150–400)
RBC # BLD: 5.26 M/UL — SIGNIFICANT CHANGE UP (ref 4.2–5.8)
RBC # FLD: 16.5 % — HIGH (ref 10.3–14.5)
WBC # BLD: 6.9 K/UL — SIGNIFICANT CHANGE UP (ref 3.8–10.5)
WBC # FLD AUTO: 6.9 K/UL — SIGNIFICANT CHANGE UP (ref 3.8–10.5)

## 2021-02-10 PROCEDURE — 99072 ADDL SUPL MATRL&STAF TM PHE: CPT

## 2021-02-10 PROCEDURE — 99214 OFFICE O/P EST MOD 30 MIN: CPT

## 2021-02-11 DIAGNOSIS — R11.2 NAUSEA WITH VOMITING, UNSPECIFIED: ICD-10-CM

## 2021-02-11 DIAGNOSIS — Z51.11 ENCOUNTER FOR ANTINEOPLASTIC CHEMOTHERAPY: ICD-10-CM

## 2021-02-24 ENCOUNTER — APPOINTMENT (OUTPATIENT)
Dept: INFUSION THERAPY | Facility: HOSPITAL | Age: 66
End: 2021-02-24

## 2021-03-02 RX ORDER — HYDROCODONE BITARTRATE AND HOMATROPINE METHYLBROMIDE 5; 1.5 MG/5ML; MG/5ML
5-1.5 SYRUP ORAL
Qty: 1 | Refills: 0 | Status: DISCONTINUED | COMMUNITY
Start: 2020-07-08 | End: 2021-03-02

## 2021-03-05 ENCOUNTER — OUTPATIENT (OUTPATIENT)
Dept: OUTPATIENT SERVICES | Facility: HOSPITAL | Age: 66
LOS: 1 days | Discharge: HOME | End: 2021-03-05

## 2021-03-05 DIAGNOSIS — Z90.2 ACQUIRED ABSENCE OF LUNG [PART OF]: Chronic | ICD-10-CM

## 2021-03-05 DIAGNOSIS — M71.20 SYNOVIAL CYST OF POPLITEAL SPACE [BAKER], UNSPECIFIED KNEE: Chronic | ICD-10-CM

## 2021-03-05 DIAGNOSIS — C34.90 MALIGNANT NEOPLASM OF UNSPECIFIED PART OF UNSPECIFIED BRONCHUS OR LUNG: ICD-10-CM

## 2021-03-10 ENCOUNTER — APPOINTMENT (OUTPATIENT)
Dept: HEMATOLOGY ONCOLOGY | Facility: CLINIC | Age: 66
End: 2021-03-10
Payer: MEDICAID

## 2021-03-10 ENCOUNTER — APPOINTMENT (OUTPATIENT)
Dept: INFUSION THERAPY | Facility: HOSPITAL | Age: 66
End: 2021-03-10

## 2021-03-10 ENCOUNTER — LABORATORY RESULT (OUTPATIENT)
Age: 66
End: 2021-03-10

## 2021-03-10 ENCOUNTER — RESULT REVIEW (OUTPATIENT)
Age: 66
End: 2021-03-10

## 2021-03-10 VITALS
WEIGHT: 231.49 LBS | SYSTOLIC BLOOD PRESSURE: 149 MMHG | BODY MASS INDEX: 39.52 KG/M2 | HEIGHT: 64.02 IN | OXYGEN SATURATION: 98 % | DIASTOLIC BLOOD PRESSURE: 81 MMHG | RESPIRATION RATE: 17 BRPM | HEART RATE: 90 BPM | TEMPERATURE: 97.7 F

## 2021-03-10 LAB
BASOPHILS # BLD AUTO: 0.04 K/UL — SIGNIFICANT CHANGE UP (ref 0–0.2)
BASOPHILS NFR BLD AUTO: 0.4 % — SIGNIFICANT CHANGE UP (ref 0–2)
EOSINOPHIL # BLD AUTO: 0.11 K/UL — SIGNIFICANT CHANGE UP (ref 0–0.5)
EOSINOPHIL NFR BLD AUTO: 1.1 % — SIGNIFICANT CHANGE UP (ref 0–6)
HCT VFR BLD CALC: 42.8 % — SIGNIFICANT CHANGE UP (ref 39–50)
HGB BLD-MCNC: 13.5 G/DL — SIGNIFICANT CHANGE UP (ref 13–17)
IMM GRANULOCYTES NFR BLD AUTO: 0.7 % — SIGNIFICANT CHANGE UP (ref 0–1.5)
LYMPHOCYTES # BLD AUTO: 1.22 K/UL — SIGNIFICANT CHANGE UP (ref 1–3.3)
LYMPHOCYTES # BLD AUTO: 12.6 % — LOW (ref 13–44)
MCHC RBC-ENTMCNC: 25.8 PG — LOW (ref 27–34)
MCHC RBC-ENTMCNC: 31.5 G/DL — LOW (ref 32–36)
MCV RBC AUTO: 81.8 FL — SIGNIFICANT CHANGE UP (ref 80–100)
MONOCYTES # BLD AUTO: 0.83 K/UL — SIGNIFICANT CHANGE UP (ref 0–0.9)
MONOCYTES NFR BLD AUTO: 8.5 % — SIGNIFICANT CHANGE UP (ref 2–14)
NEUTROPHILS # BLD AUTO: 7.44 K/UL — HIGH (ref 1.8–7.4)
NEUTROPHILS NFR BLD AUTO: 76.7 % — SIGNIFICANT CHANGE UP (ref 43–77)
NRBC # BLD: 0 /100 WBCS — SIGNIFICANT CHANGE UP (ref 0–0)
PLATELET # BLD AUTO: 244 K/UL — SIGNIFICANT CHANGE UP (ref 150–400)
RBC # BLD: 5.23 M/UL — SIGNIFICANT CHANGE UP (ref 4.2–5.8)
RBC # FLD: 17.2 % — HIGH (ref 10.3–14.5)
WBC # BLD: 9.71 K/UL — SIGNIFICANT CHANGE UP (ref 3.8–10.5)
WBC # FLD AUTO: 9.71 K/UL — SIGNIFICANT CHANGE UP (ref 3.8–10.5)

## 2021-03-10 PROCEDURE — 99072 ADDL SUPL MATRL&STAF TM PHE: CPT

## 2021-03-10 PROCEDURE — 99214 OFFICE O/P EST MOD 30 MIN: CPT

## 2021-03-10 RX ORDER — PROMETHAZINE HYDROCHLORIDE AND CODEINE PHOSPHATE 6.25; 1 MG/5ML; MG/5ML
6.25-1 SOLUTION ORAL
Qty: 1 | Refills: 0 | Status: ACTIVE | COMMUNITY
Start: 2021-03-02 | End: 1900-01-01

## 2021-03-11 DIAGNOSIS — R11.2 NAUSEA WITH VOMITING, UNSPECIFIED: ICD-10-CM

## 2021-03-11 DIAGNOSIS — Z51.11 ENCOUNTER FOR ANTINEOPLASTIC CHEMOTHERAPY: ICD-10-CM

## 2021-03-19 ENCOUNTER — APPOINTMENT (OUTPATIENT)
Dept: HEMATOLOGY ONCOLOGY | Facility: CLINIC | Age: 66
End: 2021-03-19

## 2021-03-19 ENCOUNTER — LABORATORY RESULT (OUTPATIENT)
Age: 66
End: 2021-03-19

## 2021-03-24 ENCOUNTER — APPOINTMENT (OUTPATIENT)
Dept: INFUSION THERAPY | Facility: HOSPITAL | Age: 66
End: 2021-03-24

## 2021-03-24 ENCOUNTER — RESULT REVIEW (OUTPATIENT)
Age: 66
End: 2021-03-24

## 2021-03-24 ENCOUNTER — APPOINTMENT (OUTPATIENT)
Dept: DERMATOLOGY | Facility: CLINIC | Age: 66
End: 2021-03-24
Payer: MEDICAID

## 2021-03-24 ENCOUNTER — LABORATORY RESULT (OUTPATIENT)
Age: 66
End: 2021-03-24

## 2021-03-24 DIAGNOSIS — L73.9 FOLLICULAR DISORDER, UNSPECIFIED: ICD-10-CM

## 2021-03-24 LAB
BASOPHILS # BLD AUTO: 0.02 K/UL — SIGNIFICANT CHANGE UP (ref 0–0.2)
BASOPHILS NFR BLD AUTO: 0.2 % — SIGNIFICANT CHANGE UP (ref 0–2)
EOSINOPHIL # BLD AUTO: 0.14 K/UL — SIGNIFICANT CHANGE UP (ref 0–0.5)
EOSINOPHIL NFR BLD AUTO: 1.7 % — SIGNIFICANT CHANGE UP (ref 0–6)
HCT VFR BLD CALC: 42.3 % — SIGNIFICANT CHANGE UP (ref 39–50)
HGB BLD-MCNC: 13.1 G/DL — SIGNIFICANT CHANGE UP (ref 13–17)
IMM GRANULOCYTES NFR BLD AUTO: 0.4 % — SIGNIFICANT CHANGE UP (ref 0–1.5)
LYMPHOCYTES # BLD AUTO: 0.76 K/UL — LOW (ref 1–3.3)
LYMPHOCYTES # BLD AUTO: 9.4 % — LOW (ref 13–44)
MCHC RBC-ENTMCNC: 25.4 PG — LOW (ref 27–34)
MCHC RBC-ENTMCNC: 31 G/DL — LOW (ref 32–36)
MCV RBC AUTO: 82 FL — SIGNIFICANT CHANGE UP (ref 80–100)
MONOCYTES # BLD AUTO: 0.66 K/UL — SIGNIFICANT CHANGE UP (ref 0–0.9)
MONOCYTES NFR BLD AUTO: 8.2 % — SIGNIFICANT CHANGE UP (ref 2–14)
NEUTROPHILS # BLD AUTO: 6.45 K/UL — SIGNIFICANT CHANGE UP (ref 1.8–7.4)
NEUTROPHILS NFR BLD AUTO: 80.1 % — HIGH (ref 43–77)
NRBC # BLD: 0 /100 WBCS — SIGNIFICANT CHANGE UP (ref 0–0)
PLATELET # BLD AUTO: 226 K/UL — SIGNIFICANT CHANGE UP (ref 150–400)
RBC # BLD: 5.16 M/UL — SIGNIFICANT CHANGE UP (ref 4.2–5.8)
RBC # FLD: 16.8 % — HIGH (ref 10.3–14.5)
WBC # BLD: 8.06 K/UL — SIGNIFICANT CHANGE UP (ref 3.8–10.5)
WBC # FLD AUTO: 8.06 K/UL — SIGNIFICANT CHANGE UP (ref 3.8–10.5)

## 2021-03-24 PROCEDURE — 99072 ADDL SUPL MATRL&STAF TM PHE: CPT

## 2021-03-24 PROCEDURE — 99214 OFFICE O/P EST MOD 30 MIN: CPT

## 2021-03-24 RX ORDER — CLINDAMYCIN PHOSPHATE 10 MG/ML
1 LOTION TOPICAL
Qty: 2 | Refills: 1 | Status: ACTIVE | COMMUNITY
Start: 2021-03-24 | End: 1900-01-01

## 2021-03-24 RX ORDER — CLOBETASOL PROPIONATE 0.5 MG/G
0.05 OINTMENT TOPICAL
Qty: 1 | Refills: 1 | Status: ACTIVE | COMMUNITY
Start: 2021-03-24 | End: 1900-01-01

## 2021-04-01 ENCOUNTER — OUTPATIENT (OUTPATIENT)
Dept: OUTPATIENT SERVICES | Facility: HOSPITAL | Age: 66
LOS: 1 days | Discharge: HOME | End: 2021-04-01

## 2021-04-01 DIAGNOSIS — M71.20 SYNOVIAL CYST OF POPLITEAL SPACE [BAKER], UNSPECIFIED KNEE: Chronic | ICD-10-CM

## 2021-04-01 DIAGNOSIS — Z90.2 ACQUIRED ABSENCE OF LUNG [PART OF]: Chronic | ICD-10-CM

## 2021-04-01 DIAGNOSIS — C34.90 MALIGNANT NEOPLASM OF UNSPECIFIED PART OF UNSPECIFIED BRONCHUS OR LUNG: ICD-10-CM

## 2021-04-07 ENCOUNTER — APPOINTMENT (OUTPATIENT)
Dept: HEMATOLOGY ONCOLOGY | Facility: CLINIC | Age: 66
End: 2021-04-07
Payer: MEDICAID

## 2021-04-07 ENCOUNTER — APPOINTMENT (OUTPATIENT)
Dept: INFUSION THERAPY | Facility: HOSPITAL | Age: 66
End: 2021-04-07

## 2021-04-07 VITALS
TEMPERATURE: 96.4 F | RESPIRATION RATE: 17 BRPM | OXYGEN SATURATION: 94 % | SYSTOLIC BLOOD PRESSURE: 137 MMHG | HEIGHT: 65.55 IN | DIASTOLIC BLOOD PRESSURE: 85 MMHG | BODY MASS INDEX: 38.98 KG/M2 | WEIGHT: 236.78 LBS | HEART RATE: 106 BPM

## 2021-04-07 DIAGNOSIS — L30.8 OTHER SPECIFIED DERMATITIS: ICD-10-CM

## 2021-04-07 PROCEDURE — 99072 ADDL SUPL MATRL&STAF TM PHE: CPT

## 2021-04-07 PROCEDURE — 99214 OFFICE O/P EST MOD 30 MIN: CPT

## 2021-04-08 DIAGNOSIS — R11.2 NAUSEA WITH VOMITING, UNSPECIFIED: ICD-10-CM

## 2021-04-08 DIAGNOSIS — Z51.11 ENCOUNTER FOR ANTINEOPLASTIC CHEMOTHERAPY: ICD-10-CM

## 2021-04-12 ENCOUNTER — APPOINTMENT (OUTPATIENT)
Dept: PULMONOLOGY | Facility: CLINIC | Age: 66
End: 2021-04-12

## 2021-04-14 ENCOUNTER — NON-APPOINTMENT (OUTPATIENT)
Age: 66
End: 2021-04-14

## 2021-04-21 ENCOUNTER — APPOINTMENT (OUTPATIENT)
Dept: INFUSION THERAPY | Facility: HOSPITAL | Age: 66
End: 2021-04-21

## 2021-04-21 ENCOUNTER — RESULT REVIEW (OUTPATIENT)
Age: 66
End: 2021-04-21

## 2021-04-21 ENCOUNTER — LABORATORY RESULT (OUTPATIENT)
Age: 66
End: 2021-04-21

## 2021-04-21 DIAGNOSIS — R13.10 DYSPHAGIA, UNSPECIFIED: ICD-10-CM

## 2021-04-21 LAB
BASOPHILS # BLD AUTO: 0.03 K/UL — SIGNIFICANT CHANGE UP (ref 0–0.2)
BASOPHILS NFR BLD AUTO: 0.4 % — SIGNIFICANT CHANGE UP (ref 0–2)
EOSINOPHIL # BLD AUTO: 0.1 K/UL — SIGNIFICANT CHANGE UP (ref 0–0.5)
EOSINOPHIL NFR BLD AUTO: 1.4 % — SIGNIFICANT CHANGE UP (ref 0–6)
HCT VFR BLD CALC: 43.1 % — SIGNIFICANT CHANGE UP (ref 39–50)
HGB BLD-MCNC: 13.5 G/DL — SIGNIFICANT CHANGE UP (ref 13–17)
IMM GRANULOCYTES NFR BLD AUTO: 0.4 % — SIGNIFICANT CHANGE UP (ref 0–1.5)
LYMPHOCYTES # BLD AUTO: 0.61 K/UL — LOW (ref 1–3.3)
LYMPHOCYTES # BLD AUTO: 8.7 % — LOW (ref 13–44)
MCHC RBC-ENTMCNC: 25.7 PG — LOW (ref 27–34)
MCHC RBC-ENTMCNC: 31.3 G/DL — LOW (ref 32–36)
MCV RBC AUTO: 81.9 FL — SIGNIFICANT CHANGE UP (ref 80–100)
MONOCYTES # BLD AUTO: 0.49 K/UL — SIGNIFICANT CHANGE UP (ref 0–0.9)
MONOCYTES NFR BLD AUTO: 7 % — SIGNIFICANT CHANGE UP (ref 2–14)
NEUTROPHILS # BLD AUTO: 5.73 K/UL — SIGNIFICANT CHANGE UP (ref 1.8–7.4)
NEUTROPHILS NFR BLD AUTO: 82.1 % — HIGH (ref 43–77)
NRBC # BLD: 0 /100 WBCS — SIGNIFICANT CHANGE UP (ref 0–0)
PLATELET # BLD AUTO: 224 K/UL — SIGNIFICANT CHANGE UP (ref 150–400)
RBC # BLD: 5.26 M/UL — SIGNIFICANT CHANGE UP (ref 4.2–5.8)
RBC # FLD: 16.2 % — HIGH (ref 10.3–14.5)
WBC # BLD: 6.99 K/UL — SIGNIFICANT CHANGE UP (ref 3.8–10.5)
WBC # FLD AUTO: 6.99 K/UL — SIGNIFICANT CHANGE UP (ref 3.8–10.5)

## 2021-04-22 ENCOUNTER — NON-APPOINTMENT (OUTPATIENT)
Age: 66
End: 2021-04-22

## 2021-05-03 ENCOUNTER — OUTPATIENT (OUTPATIENT)
Dept: OUTPATIENT SERVICES | Facility: HOSPITAL | Age: 66
LOS: 1 days | End: 2021-05-03
Payer: MEDICAID

## 2021-05-03 ENCOUNTER — OUTPATIENT (OUTPATIENT)
Dept: OUTPATIENT SERVICES | Facility: HOSPITAL | Age: 66
LOS: 1 days | Discharge: HOME | End: 2021-05-03

## 2021-05-03 ENCOUNTER — APPOINTMENT (OUTPATIENT)
Dept: CT IMAGING | Facility: IMAGING CENTER | Age: 66
End: 2021-05-03
Payer: MEDICAID

## 2021-05-03 DIAGNOSIS — R13.10 DYSPHAGIA, UNSPECIFIED: ICD-10-CM

## 2021-05-03 DIAGNOSIS — C34.90 MALIGNANT NEOPLASM OF UNSPECIFIED PART OF UNSPECIFIED BRONCHUS OR LUNG: ICD-10-CM

## 2021-05-03 DIAGNOSIS — M71.20 SYNOVIAL CYST OF POPLITEAL SPACE [BAKER], UNSPECIFIED KNEE: Chronic | ICD-10-CM

## 2021-05-03 DIAGNOSIS — Z90.2 ACQUIRED ABSENCE OF LUNG [PART OF]: Chronic | ICD-10-CM

## 2021-05-03 DIAGNOSIS — C34.2 MALIGNANT NEOPLASM OF MIDDLE LOBE, BRONCHUS OR LUNG: ICD-10-CM

## 2021-05-03 PROCEDURE — 71260 CT THORAX DX C+: CPT | Mod: 26

## 2021-05-03 PROCEDURE — 70491 CT SOFT TISSUE NECK W/DYE: CPT

## 2021-05-03 PROCEDURE — 70491 CT SOFT TISSUE NECK W/DYE: CPT | Mod: 26

## 2021-05-03 PROCEDURE — 71260 CT THORAX DX C+: CPT

## 2021-05-05 ENCOUNTER — APPOINTMENT (OUTPATIENT)
Dept: HEMATOLOGY ONCOLOGY | Facility: CLINIC | Age: 66
End: 2021-05-05
Payer: MEDICAID

## 2021-05-05 ENCOUNTER — APPOINTMENT (OUTPATIENT)
Dept: INFUSION THERAPY | Facility: HOSPITAL | Age: 66
End: 2021-05-05

## 2021-05-05 VITALS
BODY MASS INDEX: 39.05 KG/M2 | DIASTOLIC BLOOD PRESSURE: 84 MMHG | HEART RATE: 81 BPM | WEIGHT: 237.22 LBS | RESPIRATION RATE: 16 BRPM | SYSTOLIC BLOOD PRESSURE: 131 MMHG | TEMPERATURE: 97 F | HEIGHT: 65.55 IN | OXYGEN SATURATION: 95 %

## 2021-05-05 PROCEDURE — 99214 OFFICE O/P EST MOD 30 MIN: CPT

## 2021-05-05 PROCEDURE — 99072 ADDL SUPL MATRL&STAF TM PHE: CPT

## 2021-05-05 RX ORDER — PANTOPRAZOLE 40 MG/1
40 TABLET, DELAYED RELEASE ORAL DAILY
Qty: 30 | Refills: 5 | Status: ACTIVE | COMMUNITY
Start: 2021-05-05 | End: 1900-01-01

## 2021-05-06 DIAGNOSIS — R11.2 NAUSEA WITH VOMITING, UNSPECIFIED: ICD-10-CM

## 2021-05-06 DIAGNOSIS — Z51.11 ENCOUNTER FOR ANTINEOPLASTIC CHEMOTHERAPY: ICD-10-CM

## 2021-05-14 ENCOUNTER — APPOINTMENT (OUTPATIENT)
Dept: OTOLARYNGOLOGY | Facility: CLINIC | Age: 66
End: 2021-05-14
Payer: MEDICAID

## 2021-05-14 VITALS
WEIGHT: 237 LBS | DIASTOLIC BLOOD PRESSURE: 87 MMHG | HEIGHT: 65 IN | HEART RATE: 85 BPM | BODY MASS INDEX: 39.49 KG/M2 | SYSTOLIC BLOOD PRESSURE: 135 MMHG | TEMPERATURE: 97.4 F

## 2021-05-14 PROCEDURE — 99072 ADDL SUPL MATRL&STAF TM PHE: CPT

## 2021-05-14 PROCEDURE — 99204 OFFICE O/P NEW MOD 45 MIN: CPT

## 2021-05-14 NOTE — CONSULT LETTER
[Dear  ___] : Dear  [unfilled], [Consult Letter:] : I had the pleasure of evaluating your patient, [unfilled]. [Please see my note below.] : Please see my note below. [Consult Closing:] : Thank you very much for allowing me to participate in the care of this patient.  If you have any questions, please do not hesitate to contact me. [Sincerely,] : Sincerely, [DrBetty  ___] : Dr. HERNÁNDEZ [FreeTextEntry3] : Yolanda Blair MD\par Otolaryngology and Cranial Base Surgery\par Attending Physician - Department of Otolaryngology and Head & Neck Surgery\par [facility]\par  - [employer] [facility]\par Office: [phone]\par Fax: [phone]\par

## 2021-05-14 NOTE — PROCEDURE
[de-identified] : Afrin 0.05% and lidocaine 4% sprayed into both nasal passages. Flexible scope #2 used. Passed through nasal passage and nasopharynx, which were normal in appearance. Patient pulled scope out of nose before the larynx could be visualized and refused further attempts.

## 2021-05-14 NOTE — HISTORY OF PRESENT ILLNESS
[de-identified] : 66 y/o M with Hx of Adenocarcinoma.  S/p Right surgical resection in 2019.  S/p Chemo and XRT.  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 \par He is noting 6 months of raspy voice and some throat discomfort. Worse in the morning.  No trouble eating or drinking.  No heart burn.  was started on Pantoprazole 40 mg 10 days ago without improvement.  \par Former smoker, 1 PPD x 50 years, Quit smoking 2 y/a.

## 2021-05-14 NOTE — ASSESSMENT
[FreeTextEntry1] : Dysphonia, Likely LPRD:\par - Pt did not tolerate laryngoscopy - and declined trying again.  Advised unable to evaluate if any laryngeal masses or lesions. \par - of note CT neck 5/3/21 was without masses noted however explained to patient and wife that laryngeal lesions are best visualized with laryngoscopy and especially with his significant smoking history he should have this checked however he refused to cooperate with exam\par - F/U for any worsening symptoms, Hemoptysis, or trouble eating or drinking if he is agreeable to laryngoscopy\par - since most likely symptoms are related to reflux/LPRD would recommend he continue pantoprazole daily for at least 1 month to have good effect and see if this resolves his dysphagia\par \par \par I personally saw and examined Mr. SARAH DELGADO in detail this visit today. I personally reviewed the HPI, PMH, FH, SH, ROS and medications/allergies. I have spoken to BRYNN Patel regarding the history and have personally determined the assessment and plan of care, and documented this myself. I was present and participated in all key portions of the encounter and all procedures noted above. I have made changes in the body of the note where appropriate.\par \par Attesting Faculty: See Attending Signature Below

## 2021-05-19 ENCOUNTER — RESULT REVIEW (OUTPATIENT)
Age: 66
End: 2021-05-19

## 2021-05-19 ENCOUNTER — APPOINTMENT (OUTPATIENT)
Dept: INFUSION THERAPY | Facility: HOSPITAL | Age: 66
End: 2021-05-19

## 2021-05-19 ENCOUNTER — LABORATORY RESULT (OUTPATIENT)
Age: 66
End: 2021-05-19

## 2021-05-19 LAB
BASOPHILS # BLD AUTO: 0.03 K/UL — SIGNIFICANT CHANGE UP (ref 0–0.2)
BASOPHILS NFR BLD AUTO: 0.5 % — SIGNIFICANT CHANGE UP (ref 0–2)
EOSINOPHIL # BLD AUTO: 0.17 K/UL — SIGNIFICANT CHANGE UP (ref 0–0.5)
EOSINOPHIL NFR BLD AUTO: 2.6 % — SIGNIFICANT CHANGE UP (ref 0–6)
HCT VFR BLD CALC: 41.8 % — SIGNIFICANT CHANGE UP (ref 39–50)
HGB BLD-MCNC: 13.2 G/DL — SIGNIFICANT CHANGE UP (ref 13–17)
IMM GRANULOCYTES NFR BLD AUTO: 0.5 % — SIGNIFICANT CHANGE UP (ref 0–1.5)
LYMPHOCYTES # BLD AUTO: 1.15 K/UL — SIGNIFICANT CHANGE UP (ref 1–3.3)
LYMPHOCYTES # BLD AUTO: 17.3 % — SIGNIFICANT CHANGE UP (ref 13–44)
MCHC RBC-ENTMCNC: 25.9 PG — LOW (ref 27–34)
MCHC RBC-ENTMCNC: 31.6 G/DL — LOW (ref 32–36)
MCV RBC AUTO: 82.1 FL — SIGNIFICANT CHANGE UP (ref 80–100)
MONOCYTES # BLD AUTO: 0.68 K/UL — SIGNIFICANT CHANGE UP (ref 0–0.9)
MONOCYTES NFR BLD AUTO: 10.2 % — SIGNIFICANT CHANGE UP (ref 2–14)
NEUTROPHILS # BLD AUTO: 4.58 K/UL — SIGNIFICANT CHANGE UP (ref 1.8–7.4)
NEUTROPHILS NFR BLD AUTO: 68.9 % — SIGNIFICANT CHANGE UP (ref 43–77)
NRBC # BLD: 0 /100 WBCS — SIGNIFICANT CHANGE UP (ref 0–0)
PLATELET # BLD AUTO: 202 K/UL — SIGNIFICANT CHANGE UP (ref 150–400)
RBC # BLD: 5.09 M/UL — SIGNIFICANT CHANGE UP (ref 4.2–5.8)
RBC # FLD: 15.9 % — HIGH (ref 10.3–14.5)
WBC # BLD: 6.64 K/UL — SIGNIFICANT CHANGE UP (ref 3.8–10.5)
WBC # FLD AUTO: 6.64 K/UL — SIGNIFICANT CHANGE UP (ref 3.8–10.5)

## 2021-06-02 ENCOUNTER — APPOINTMENT (OUTPATIENT)
Dept: INFUSION THERAPY | Facility: HOSPITAL | Age: 66
End: 2021-06-02

## 2021-06-02 ENCOUNTER — APPOINTMENT (OUTPATIENT)
Dept: HEMATOLOGY ONCOLOGY | Facility: CLINIC | Age: 66
End: 2021-06-02
Payer: MEDICAID

## 2021-06-02 VITALS
HEART RATE: 86 BPM | WEIGHT: 239.2 LBS | SYSTOLIC BLOOD PRESSURE: 160 MMHG | HEIGHT: 65 IN | OXYGEN SATURATION: 98 % | RESPIRATION RATE: 16 BRPM | DIASTOLIC BLOOD PRESSURE: 93 MMHG | TEMPERATURE: 97.7 F | BODY MASS INDEX: 39.85 KG/M2

## 2021-06-02 DIAGNOSIS — R49.0 DYSPHONIA: ICD-10-CM

## 2021-06-02 PROCEDURE — 99072 ADDL SUPL MATRL&STAF TM PHE: CPT

## 2021-06-02 PROCEDURE — 99214 OFFICE O/P EST MOD 30 MIN: CPT

## 2021-06-15 ENCOUNTER — OUTPATIENT (OUTPATIENT)
Dept: OUTPATIENT SERVICES | Facility: HOSPITAL | Age: 66
LOS: 1 days | Discharge: HOME | End: 2021-06-15

## 2021-06-15 DIAGNOSIS — M71.20 SYNOVIAL CYST OF POPLITEAL SPACE [BAKER], UNSPECIFIED KNEE: Chronic | ICD-10-CM

## 2021-06-15 DIAGNOSIS — Z90.2 ACQUIRED ABSENCE OF LUNG [PART OF]: Chronic | ICD-10-CM

## 2021-06-15 DIAGNOSIS — C34.90 MALIGNANT NEOPLASM OF UNSPECIFIED PART OF UNSPECIFIED BRONCHUS OR LUNG: ICD-10-CM

## 2021-06-16 ENCOUNTER — RESULT REVIEW (OUTPATIENT)
Age: 66
End: 2021-06-16

## 2021-06-16 ENCOUNTER — LABORATORY RESULT (OUTPATIENT)
Age: 66
End: 2021-06-16

## 2021-06-16 ENCOUNTER — APPOINTMENT (OUTPATIENT)
Dept: INFUSION THERAPY | Facility: HOSPITAL | Age: 66
End: 2021-06-16

## 2021-06-16 LAB
BASOPHILS # BLD AUTO: 0.03 K/UL — SIGNIFICANT CHANGE UP (ref 0–0.2)
BASOPHILS NFR BLD AUTO: 0.3 % — SIGNIFICANT CHANGE UP (ref 0–2)
EOSINOPHIL # BLD AUTO: 0.1 K/UL — SIGNIFICANT CHANGE UP (ref 0–0.5)
EOSINOPHIL NFR BLD AUTO: 1.1 % — SIGNIFICANT CHANGE UP (ref 0–6)
HCT VFR BLD CALC: 44.6 % — SIGNIFICANT CHANGE UP (ref 39–50)
HGB BLD-MCNC: 13.6 G/DL — SIGNIFICANT CHANGE UP (ref 13–17)
IMM GRANULOCYTES NFR BLD AUTO: 0.5 % — SIGNIFICANT CHANGE UP (ref 0–1.5)
LYMPHOCYTES # BLD AUTO: 0.95 K/UL — LOW (ref 1–3.3)
LYMPHOCYTES # BLD AUTO: 10.3 % — LOW (ref 13–44)
MCHC RBC-ENTMCNC: 25.2 PG — LOW (ref 27–34)
MCHC RBC-ENTMCNC: 30.5 G/DL — LOW (ref 32–36)
MCV RBC AUTO: 82.6 FL — SIGNIFICANT CHANGE UP (ref 80–100)
MONOCYTES # BLD AUTO: 0.63 K/UL — SIGNIFICANT CHANGE UP (ref 0–0.9)
MONOCYTES NFR BLD AUTO: 6.8 % — SIGNIFICANT CHANGE UP (ref 2–14)
NEUTROPHILS # BLD AUTO: 7.44 K/UL — HIGH (ref 1.8–7.4)
NEUTROPHILS NFR BLD AUTO: 81 % — HIGH (ref 43–77)
NRBC # BLD: 0 /100 WBCS — SIGNIFICANT CHANGE UP (ref 0–0)
PLATELET # BLD AUTO: 212 K/UL — SIGNIFICANT CHANGE UP (ref 150–400)
RBC # BLD: 5.4 M/UL — SIGNIFICANT CHANGE UP (ref 4.2–5.8)
RBC # FLD: 16.2 % — HIGH (ref 10.3–14.5)
WBC # BLD: 9.2 K/UL — SIGNIFICANT CHANGE UP (ref 3.8–10.5)
WBC # FLD AUTO: 9.2 K/UL — SIGNIFICANT CHANGE UP (ref 3.8–10.5)

## 2021-06-17 DIAGNOSIS — Z51.11 ENCOUNTER FOR ANTINEOPLASTIC CHEMOTHERAPY: ICD-10-CM

## 2021-06-17 DIAGNOSIS — R11.2 NAUSEA WITH VOMITING, UNSPECIFIED: ICD-10-CM

## 2021-06-30 ENCOUNTER — APPOINTMENT (OUTPATIENT)
Dept: INFUSION THERAPY | Facility: HOSPITAL | Age: 66
End: 2021-06-30

## 2021-06-30 ENCOUNTER — APPOINTMENT (OUTPATIENT)
Dept: HEMATOLOGY ONCOLOGY | Facility: CLINIC | Age: 66
End: 2021-06-30
Payer: MEDICAID

## 2021-06-30 VITALS
WEIGHT: 242.07 LBS | DIASTOLIC BLOOD PRESSURE: 87 MMHG | TEMPERATURE: 97.9 F | HEART RATE: 82 BPM | SYSTOLIC BLOOD PRESSURE: 150 MMHG | OXYGEN SATURATION: 97 % | HEIGHT: 65.2 IN | RESPIRATION RATE: 17 BRPM | BODY MASS INDEX: 39.85 KG/M2

## 2021-06-30 PROCEDURE — 99072 ADDL SUPL MATRL&STAF TM PHE: CPT

## 2021-06-30 PROCEDURE — 99214 OFFICE O/P EST MOD 30 MIN: CPT

## 2021-07-14 ENCOUNTER — RESULT REVIEW (OUTPATIENT)
Age: 66
End: 2021-07-14

## 2021-07-14 ENCOUNTER — APPOINTMENT (OUTPATIENT)
Dept: INFUSION THERAPY | Facility: HOSPITAL | Age: 66
End: 2021-07-14

## 2021-07-14 ENCOUNTER — LABORATORY RESULT (OUTPATIENT)
Age: 66
End: 2021-07-14

## 2021-07-14 LAB
BASOPHILS # BLD AUTO: 0.03 K/UL — SIGNIFICANT CHANGE UP (ref 0–0.2)
BASOPHILS NFR BLD AUTO: 0.4 % — SIGNIFICANT CHANGE UP (ref 0–2)
EOSINOPHIL # BLD AUTO: 0.16 K/UL — SIGNIFICANT CHANGE UP (ref 0–0.5)
EOSINOPHIL NFR BLD AUTO: 2.3 % — SIGNIFICANT CHANGE UP (ref 0–6)
HCT VFR BLD CALC: 41.5 % — SIGNIFICANT CHANGE UP (ref 39–50)
HGB BLD-MCNC: 12.7 G/DL — LOW (ref 13–17)
IMM GRANULOCYTES NFR BLD AUTO: 0.8 % — SIGNIFICANT CHANGE UP (ref 0–1.5)
LYMPHOCYTES # BLD AUTO: 0.82 K/UL — LOW (ref 1–3.3)
LYMPHOCYTES # BLD AUTO: 11.6 % — LOW (ref 13–44)
MCHC RBC-ENTMCNC: 25.3 PG — LOW (ref 27–34)
MCHC RBC-ENTMCNC: 30.6 G/DL — LOW (ref 32–36)
MCV RBC AUTO: 82.7 FL — SIGNIFICANT CHANGE UP (ref 80–100)
MONOCYTES # BLD AUTO: 0.59 K/UL — SIGNIFICANT CHANGE UP (ref 0–0.9)
MONOCYTES NFR BLD AUTO: 8.3 % — SIGNIFICANT CHANGE UP (ref 2–14)
NEUTROPHILS # BLD AUTO: 5.41 K/UL — SIGNIFICANT CHANGE UP (ref 1.8–7.4)
NEUTROPHILS NFR BLD AUTO: 76.6 % — SIGNIFICANT CHANGE UP (ref 43–77)
NRBC # BLD: 0 /100 WBCS — SIGNIFICANT CHANGE UP (ref 0–0)
PLATELET # BLD AUTO: 235 K/UL — SIGNIFICANT CHANGE UP (ref 150–400)
RBC # BLD: 5.02 M/UL — SIGNIFICANT CHANGE UP (ref 4.2–5.8)
RBC # FLD: 16.5 % — HIGH (ref 10.3–14.5)
WBC # BLD: 7.07 K/UL — SIGNIFICANT CHANGE UP (ref 3.8–10.5)
WBC # FLD AUTO: 7.07 K/UL — SIGNIFICANT CHANGE UP (ref 3.8–10.5)

## 2021-07-26 ENCOUNTER — OUTPATIENT (OUTPATIENT)
Dept: OUTPATIENT SERVICES | Facility: HOSPITAL | Age: 66
LOS: 1 days | Discharge: HOME | End: 2021-07-26

## 2021-07-26 DIAGNOSIS — Z90.2 ACQUIRED ABSENCE OF LUNG [PART OF]: Chronic | ICD-10-CM

## 2021-07-26 DIAGNOSIS — M71.20 SYNOVIAL CYST OF POPLITEAL SPACE [BAKER], UNSPECIFIED KNEE: Chronic | ICD-10-CM

## 2021-07-26 DIAGNOSIS — C34.90 MALIGNANT NEOPLASM OF UNSPECIFIED PART OF UNSPECIFIED BRONCHUS OR LUNG: ICD-10-CM

## 2021-07-28 ENCOUNTER — APPOINTMENT (OUTPATIENT)
Dept: INFUSION THERAPY | Facility: HOSPITAL | Age: 66
End: 2021-07-28

## 2021-07-28 ENCOUNTER — APPOINTMENT (OUTPATIENT)
Dept: HEMATOLOGY ONCOLOGY | Facility: CLINIC | Age: 66
End: 2021-07-28
Payer: MEDICAID

## 2021-07-28 VITALS
RESPIRATION RATE: 17 BRPM | SYSTOLIC BLOOD PRESSURE: 114 MMHG | TEMPERATURE: 97.2 F | BODY MASS INDEX: 38.92 KG/M2 | OXYGEN SATURATION: 97 % | HEIGHT: 65.67 IN | WEIGHT: 239.31 LBS | DIASTOLIC BLOOD PRESSURE: 73 MMHG | HEART RATE: 88 BPM

## 2021-07-28 DIAGNOSIS — K21.9 GASTRO-ESOPHAGEAL REFLUX DISEASE W/OUT ESOPHAGITIS: ICD-10-CM

## 2021-07-28 PROCEDURE — 99214 OFFICE O/P EST MOD 30 MIN: CPT

## 2021-07-28 PROCEDURE — 99072 ADDL SUPL MATRL&STAF TM PHE: CPT

## 2021-07-29 DIAGNOSIS — Z51.11 ENCOUNTER FOR ANTINEOPLASTIC CHEMOTHERAPY: ICD-10-CM

## 2021-07-29 DIAGNOSIS — R11.2 NAUSEA WITH VOMITING, UNSPECIFIED: ICD-10-CM

## 2021-08-11 ENCOUNTER — APPOINTMENT (OUTPATIENT)
Dept: INFUSION THERAPY | Facility: HOSPITAL | Age: 66
End: 2021-08-11

## 2021-08-11 ENCOUNTER — LABORATORY RESULT (OUTPATIENT)
Age: 66
End: 2021-08-11

## 2021-08-11 ENCOUNTER — RESULT REVIEW (OUTPATIENT)
Age: 66
End: 2021-08-11

## 2021-08-11 LAB
BASOPHILS # BLD AUTO: 0.03 K/UL — SIGNIFICANT CHANGE UP (ref 0–0.2)
BASOPHILS NFR BLD AUTO: 0.4 % — SIGNIFICANT CHANGE UP (ref 0–2)
EOSINOPHIL # BLD AUTO: 0.16 K/UL — SIGNIFICANT CHANGE UP (ref 0–0.5)
EOSINOPHIL NFR BLD AUTO: 2.1 % — SIGNIFICANT CHANGE UP (ref 0–6)
HCT VFR BLD CALC: 38.7 % — LOW (ref 39–50)
HGB BLD-MCNC: 12 G/DL — LOW (ref 13–17)
IMM GRANULOCYTES NFR BLD AUTO: 0.8 % — SIGNIFICANT CHANGE UP (ref 0–1.5)
LYMPHOCYTES # BLD AUTO: 0.81 K/UL — LOW (ref 1–3.3)
LYMPHOCYTES # BLD AUTO: 10.6 % — LOW (ref 13–44)
MCHC RBC-ENTMCNC: 25.9 PG — LOW (ref 27–34)
MCHC RBC-ENTMCNC: 31 G/DL — LOW (ref 32–36)
MCV RBC AUTO: 83.6 FL — SIGNIFICANT CHANGE UP (ref 80–100)
MONOCYTES # BLD AUTO: 0.55 K/UL — SIGNIFICANT CHANGE UP (ref 0–0.9)
MONOCYTES NFR BLD AUTO: 7.2 % — SIGNIFICANT CHANGE UP (ref 2–14)
NEUTROPHILS # BLD AUTO: 6.05 K/UL — SIGNIFICANT CHANGE UP (ref 1.8–7.4)
NEUTROPHILS NFR BLD AUTO: 78.9 % — HIGH (ref 43–77)
NRBC # BLD: 0 /100 WBCS — SIGNIFICANT CHANGE UP (ref 0–0)
PLATELET # BLD AUTO: 219 K/UL — SIGNIFICANT CHANGE UP (ref 150–400)
RBC # BLD: 4.63 M/UL — SIGNIFICANT CHANGE UP (ref 4.2–5.8)
RBC # FLD: 16.7 % — HIGH (ref 10.3–14.5)
WBC # BLD: 7.66 K/UL — SIGNIFICANT CHANGE UP (ref 3.8–10.5)
WBC # FLD AUTO: 7.66 K/UL — SIGNIFICANT CHANGE UP (ref 3.8–10.5)

## 2021-08-23 ENCOUNTER — OUTPATIENT (OUTPATIENT)
Dept: OUTPATIENT SERVICES | Facility: HOSPITAL | Age: 66
LOS: 1 days | End: 2021-08-23
Payer: MEDICAID

## 2021-08-23 ENCOUNTER — APPOINTMENT (OUTPATIENT)
Dept: CT IMAGING | Facility: IMAGING CENTER | Age: 66
End: 2021-08-23
Payer: MEDICAID

## 2021-08-23 DIAGNOSIS — M71.20 SYNOVIAL CYST OF POPLITEAL SPACE [BAKER], UNSPECIFIED KNEE: Chronic | ICD-10-CM

## 2021-08-23 DIAGNOSIS — Z00.8 ENCOUNTER FOR OTHER GENERAL EXAMINATION: ICD-10-CM

## 2021-08-23 DIAGNOSIS — Z90.2 ACQUIRED ABSENCE OF LUNG [PART OF]: Chronic | ICD-10-CM

## 2021-08-23 DIAGNOSIS — C34.2 MALIGNANT NEOPLASM OF MIDDLE LOBE, BRONCHUS OR LUNG: ICD-10-CM

## 2021-08-23 PROCEDURE — 71260 CT THORAX DX C+: CPT

## 2021-08-23 PROCEDURE — 71260 CT THORAX DX C+: CPT | Mod: 26

## 2021-08-25 ENCOUNTER — APPOINTMENT (OUTPATIENT)
Dept: INFUSION THERAPY | Facility: HOSPITAL | Age: 66
End: 2021-08-25

## 2021-08-25 ENCOUNTER — LABORATORY RESULT (OUTPATIENT)
Age: 66
End: 2021-08-25

## 2021-08-25 ENCOUNTER — APPOINTMENT (OUTPATIENT)
Dept: HEMATOLOGY ONCOLOGY | Facility: CLINIC | Age: 66
End: 2021-08-25
Payer: MEDICAID

## 2021-08-25 ENCOUNTER — RESULT REVIEW (OUTPATIENT)
Age: 66
End: 2021-08-25

## 2021-08-25 VITALS
OXYGEN SATURATION: 97 % | RESPIRATION RATE: 18 BRPM | TEMPERATURE: 97.8 F | DIASTOLIC BLOOD PRESSURE: 80 MMHG | HEART RATE: 89 BPM | BODY MASS INDEX: 39.01 KG/M2 | WEIGHT: 239.86 LBS | HEIGHT: 65.67 IN | SYSTOLIC BLOOD PRESSURE: 146 MMHG

## 2021-08-25 DIAGNOSIS — Z51.12 ENCOUNTER FOR ANTINEOPLASTIC IMMUNOTHERAPY: ICD-10-CM

## 2021-08-25 LAB
BASOPHILS # BLD AUTO: 0.03 K/UL — SIGNIFICANT CHANGE UP (ref 0–0.2)
BASOPHILS NFR BLD AUTO: 0.4 % — SIGNIFICANT CHANGE UP (ref 0–2)
EOSINOPHIL # BLD AUTO: 0.19 K/UL — SIGNIFICANT CHANGE UP (ref 0–0.5)
EOSINOPHIL NFR BLD AUTO: 2.6 % — SIGNIFICANT CHANGE UP (ref 0–6)
HCT VFR BLD CALC: 40 % — SIGNIFICANT CHANGE UP (ref 39–50)
HGB BLD-MCNC: 12.3 G/DL — LOW (ref 13–17)
IMM GRANULOCYTES NFR BLD AUTO: 0.6 % — SIGNIFICANT CHANGE UP (ref 0–1.5)
LYMPHOCYTES # BLD AUTO: 0.81 K/UL — LOW (ref 1–3.3)
LYMPHOCYTES # BLD AUTO: 11.2 % — LOW (ref 13–44)
MCHC RBC-ENTMCNC: 25.6 PG — LOW (ref 27–34)
MCHC RBC-ENTMCNC: 30.8 G/DL — LOW (ref 32–36)
MCV RBC AUTO: 83.3 FL — SIGNIFICANT CHANGE UP (ref 80–100)
MONOCYTES # BLD AUTO: 0.68 K/UL — SIGNIFICANT CHANGE UP (ref 0–0.9)
MONOCYTES NFR BLD AUTO: 9.4 % — SIGNIFICANT CHANGE UP (ref 2–14)
NEUTROPHILS # BLD AUTO: 5.51 K/UL — SIGNIFICANT CHANGE UP (ref 1.8–7.4)
NEUTROPHILS NFR BLD AUTO: 75.8 % — SIGNIFICANT CHANGE UP (ref 43–77)
NRBC # BLD: 0 /100 WBCS — SIGNIFICANT CHANGE UP (ref 0–0)
PLATELET # BLD AUTO: 223 K/UL — SIGNIFICANT CHANGE UP (ref 150–400)
RBC # BLD: 4.8 M/UL — SIGNIFICANT CHANGE UP (ref 4.2–5.8)
RBC # FLD: 17.1 % — HIGH (ref 10.3–14.5)
WBC # BLD: 7.26 K/UL — SIGNIFICANT CHANGE UP (ref 3.8–10.5)
WBC # FLD AUTO: 7.26 K/UL — SIGNIFICANT CHANGE UP (ref 3.8–10.5)

## 2021-08-25 PROCEDURE — 99214 OFFICE O/P EST MOD 30 MIN: CPT

## 2021-08-31 ENCOUNTER — OUTPATIENT (OUTPATIENT)
Dept: OUTPATIENT SERVICES | Facility: HOSPITAL | Age: 66
LOS: 1 days | End: 2021-08-31
Payer: MEDICAID

## 2021-08-31 DIAGNOSIS — M71.20 SYNOVIAL CYST OF POPLITEAL SPACE [BAKER], UNSPECIFIED KNEE: Chronic | ICD-10-CM

## 2021-08-31 DIAGNOSIS — Z11.52 ENCOUNTER FOR SCREENING FOR COVID-19: ICD-10-CM

## 2021-08-31 DIAGNOSIS — Z90.2 ACQUIRED ABSENCE OF LUNG [PART OF]: Chronic | ICD-10-CM

## 2021-08-31 LAB — SARS-COV-2 RNA SPEC QL NAA+PROBE: SIGNIFICANT CHANGE UP

## 2021-08-31 PROCEDURE — U0005: CPT

## 2021-08-31 PROCEDURE — U0003: CPT

## 2021-08-31 PROCEDURE — C9803: CPT

## 2021-09-03 ENCOUNTER — OUTPATIENT (OUTPATIENT)
Dept: OUTPATIENT SERVICES | Facility: HOSPITAL | Age: 66
LOS: 1 days | End: 2021-09-03
Payer: MEDICAID

## 2021-09-03 ENCOUNTER — RESULT REVIEW (OUTPATIENT)
Age: 66
End: 2021-09-03

## 2021-09-03 VITALS
OXYGEN SATURATION: 98 % | RESPIRATION RATE: 16 BRPM | TEMPERATURE: 98 F | SYSTOLIC BLOOD PRESSURE: 100 MMHG | HEART RATE: 97 BPM | DIASTOLIC BLOOD PRESSURE: 60 MMHG | WEIGHT: 197.98 LBS | HEIGHT: 64.5 IN

## 2021-09-03 VITALS
RESPIRATION RATE: 17 BRPM | DIASTOLIC BLOOD PRESSURE: 74 MMHG | HEART RATE: 83 BPM | SYSTOLIC BLOOD PRESSURE: 109 MMHG | OXYGEN SATURATION: 97 %

## 2021-09-03 DIAGNOSIS — Z90.2 ACQUIRED ABSENCE OF LUNG [PART OF]: Chronic | ICD-10-CM

## 2021-09-03 DIAGNOSIS — M71.20 SYNOVIAL CYST OF POPLITEAL SPACE [BAKER], UNSPECIFIED KNEE: Chronic | ICD-10-CM

## 2021-09-03 DIAGNOSIS — C34.2 MALIGNANT NEOPLASM OF MIDDLE LOBE, BRONCHUS OR LUNG: ICD-10-CM

## 2021-09-03 LAB — GLUCOSE BLDC GLUCOMTR-MCNC: 118 MG/DL — HIGH (ref 70–99)

## 2021-09-03 PROCEDURE — 36590 REMOVAL TUNNELED CV CATH: CPT

## 2021-09-03 PROCEDURE — 82962 GLUCOSE BLOOD TEST: CPT

## 2021-09-03 RX ORDER — BISOPROLOL FUMARATE 10 MG/1
1 TABLET, FILM COATED ORAL
Qty: 0 | Refills: 0 | DISCHARGE

## 2021-09-03 RX ORDER — TAMSULOSIN HYDROCHLORIDE 0.4 MG/1
1 CAPSULE ORAL
Qty: 0 | Refills: 0 | DISCHARGE

## 2021-09-03 RX ORDER — HYDROMORPHONE HYDROCHLORIDE 2 MG/ML
0.25 INJECTION INTRAMUSCULAR; INTRAVENOUS; SUBCUTANEOUS
Refills: 0 | Status: DISCONTINUED | OUTPATIENT
Start: 2021-09-03 | End: 2021-09-03

## 2021-09-03 RX ORDER — DEXAMETHASONE 0.5 MG/5ML
8 ELIXIR ORAL ONCE
Refills: 0 | Status: DISCONTINUED | OUTPATIENT
Start: 2021-09-03 | End: 2021-09-17

## 2021-09-03 RX ORDER — HYDROMORPHONE HYDROCHLORIDE 2 MG/ML
0.4 INJECTION INTRAMUSCULAR; INTRAVENOUS; SUBCUTANEOUS
Refills: 0 | Status: DISCONTINUED | OUTPATIENT
Start: 2021-09-03 | End: 2021-09-03

## 2021-09-03 RX ORDER — LISINOPRIL 2.5 MG/1
1 TABLET ORAL
Qty: 0 | Refills: 0 | DISCHARGE

## 2021-09-03 RX ORDER — ONDANSETRON 8 MG/1
4 TABLET, FILM COATED ORAL ONCE
Refills: 0 | Status: DISCONTINUED | OUTPATIENT
Start: 2021-09-03 | End: 2021-09-03

## 2021-09-03 RX ORDER — ONDANSETRON 8 MG/1
4 TABLET, FILM COATED ORAL ONCE
Refills: 0 | Status: DISCONTINUED | OUTPATIENT
Start: 2021-09-03 | End: 2021-09-17

## 2021-09-03 NOTE — ASU DISCHARGE PLAN (ADULT/PEDIATRIC) - NURSING INSTRUCTIONS
Please feel free to contact us at [phone] if any problems arise. After 6PM, Monday through Friday, on weekends and on holidays, please call [phone] and ask for the radiology resident on call to be paged.

## 2021-09-03 NOTE — PRE-ANESTHESIA EVALUATION ADULT - NSANTHOSAYNRD_GEN_A_CORE
No. DORENE screening performed.  STOP BANG Legend: 0-2 = LOW Risk; 3-4 = INTERMEDIATE Risk; 5-8 = HIGH Risk

## 2021-09-03 NOTE — PRE PROCEDURE NOTE - PRE PROCEDURE EVALUATION
HPI: 64 year old male with a history of lung cancer, prior right chest wall port placement. Patient has completed therapy for lung cancer, now presents for port removal.     NPO status: Since midnight  Anticoagulation: None  Antibiotics: None    Allergies: No Known Allergies    PAST MEDICAL & SURGICAL HISTORY:  Stage I adenocarcinoma of lung, unspecified laterality  s/p lung resection R lung, s/p CHOP, cisplatin    Mycosis fungoides    Hypertension    History of BPH    S/P lobectomy of lung    Ganglion or synovial cyst of knee  excision from right knee    Pertinent labs:    Consent: Procedure/risks/ Benefits explained. Informed consent obtained. Pt verbalizes understanding.

## 2021-09-03 NOTE — ASU DISCHARGE PLAN (ADULT/PEDIATRIC) - ASU DC SPECIAL INSTRUCTIONSFT
Right chest wall port removal:    Discharge Instructions  - You have had a chest port removed from your right chest.   - You may shower in 48 hours. No soaking or swimming for 2 weeks or until the site is completely healed.  - Keep the area covered and dry for the next 7 days. It may be removed by a chemotherapy nurse as needed for treatment.  - Do not perform any heavy lifting or put tension on the area for the next week or until the site is healed.  - You may resume your normal diet.  - You may resume your normal medications however you should wait 48 hours before restarting aspirin, plavix, or blood thinners.  - It is normal to experience some pain over the site for the next few days. You may take apply ice to the area (20 minutes on, 20 minutes off) and take Tylenol for that pain. Do not take more frequently than every 6 hours and do not exceed more than 3000mg of Tylenol in a 24 hour period.    - You were given conscious sedation which may make you drowsy, therefore you need someone to stay with you until the morning following the procedure.  - Do not drive, engage in heavy lifting or strenuous activity, or drink any alcoholic beverages for the next 24 hours.   - You may resume normal activity in 24 hours.    Notify your primary physician and/or Interventional Radiology IMMEDIATELY if you experience any of the following       - Fever of 101F or 38C       - Chills or Rigors/ Shakes       - Swelling and/or Redness in the area around the port       - Worsening Pain       - Blood soaked bandages or worsening bleeding       - Lightheadedness and/or dizziness upon standing       - Chest Pain/ Tightness       - Shortness of Breath       - Difficulty walking    If you have a problem that you believe requires IMMEDIATE attention, please go to your NEAREST Emergency Room. If you believe your problem can safely wait until you speak to a physician, please call Interventional Radiology for any concerns.    During Normal Weekday Business Hours- You can contact the Interventional Radiology department during normal business hours via telephone.  During Evenings and Weekends- If you need to contact Interventional Radiology during off hours, do so by calling the hospital and requesting to be connected to the Interventional Radiologist on call.

## 2021-09-10 DIAGNOSIS — C34.91 MALIGNANT NEOPLASM OF UNSPECIFIED PART OF RIGHT BRONCHUS OR LUNG: ICD-10-CM

## 2021-09-10 DIAGNOSIS — Z45.2 ENCOUNTER FOR ADJUSTMENT AND MANAGEMENT OF VASCULAR ACCESS DEVICE: ICD-10-CM

## 2021-10-21 NOTE — PROCEDURE NOTE - SEDATION
Problem: Falls - Risk of  Goal: *Absence of Falls  Description: Document Green Salvia Fall Risk and appropriate interventions in the flowsheet. 10/21/2021 0325 by Rober Ferguson RN  Outcome: Progressing Towards Goal  Note: Fall Risk Interventions:  Mobility Interventions: Communicate number of staff needed for ambulation/transfer         Medication Interventions: Patient to call before getting OOB, Teach patient to arise slowly, Bed/chair exit alarm    Elimination Interventions: Elevated toilet seat, Call light in reach, Toileting schedule/hourly rounds    History of Falls Interventions: Bed/chair exit alarm, Door open when patient unattended      10/21/2021 0325 by Rober Ferguson RN  Outcome: Progressing Towards Goal  Note: Fall Risk Interventions:  Mobility Interventions: Communicate number of staff needed for ambulation/transfer         Medication Interventions: Patient to call before getting OOB, Teach patient to arise slowly, Bed/chair exit alarm    Elimination Interventions: Elevated toilet seat, Call light in reach, Toileting schedule/hourly rounds    History of Falls Interventions: Bed/chair exit alarm, Door open when patient unattended         Problem: Pressure Injury - Risk of  Goal: *Prevention of pressure injury  Description: Document Hilton Scale and appropriate interventions in the flowsheet.   10/21/2021 0325 by Rober Ferguson RN  Outcome: Progressing Towards Goal  Note: Pressure Injury Interventions:  Sensory Interventions: Maintain/enhance activity level    Moisture Interventions: Maintain skin hydration (lotion/cream)    Activity Interventions: Increase time out of bed, Pressure redistribution bed/mattress(bed type), PT/OT evaluation    Mobility Interventions: HOB 30 degrees or less, Pressure redistribution bed/mattress (bed type), PT/OT evaluation    Nutrition Interventions: Document food/fluid/supplement intake    Friction and Shear Interventions: HOB 30 degrees or less 10/21/2021 0325 by Sandi Roque RN  Outcome: Progressing Towards Goal  Note: Pressure Injury Interventions:  Sensory Interventions: Maintain/enhance activity level    Moisture Interventions: Maintain skin hydration (lotion/cream)    Activity Interventions: Increase time out of bed, Pressure redistribution bed/mattress(bed type), PT/OT evaluation    Mobility Interventions: HOB 30 degrees or less, Pressure redistribution bed/mattress (bed type), PT/OT evaluation    Nutrition Interventions: Document food/fluid/supplement intake    Friction and Shear Interventions: HOB 30 degrees or less                Problem: Pain  Goal: *Control of Pain  Outcome: Progressing Towards Goal Provided by Anesthesia Department

## 2022-03-01 ENCOUNTER — NON-APPOINTMENT (OUTPATIENT)
Age: 67
End: 2022-03-01

## 2022-03-01 ENCOUNTER — APPOINTMENT (OUTPATIENT)
Dept: CT IMAGING | Facility: IMAGING CENTER | Age: 67
End: 2022-03-01

## 2022-03-08 ENCOUNTER — APPOINTMENT (OUTPATIENT)
Dept: HEMATOLOGY ONCOLOGY | Facility: CLINIC | Age: 67
End: 2022-03-08

## 2022-03-15 ENCOUNTER — APPOINTMENT (OUTPATIENT)
Dept: CT IMAGING | Facility: CLINIC | Age: 67
End: 2022-03-15
Payer: MEDICAID

## 2022-03-15 ENCOUNTER — OUTPATIENT (OUTPATIENT)
Dept: OUTPATIENT SERVICES | Facility: HOSPITAL | Age: 67
LOS: 1 days | End: 2022-03-15
Payer: MEDICAID

## 2022-03-15 DIAGNOSIS — M71.20 SYNOVIAL CYST OF POPLITEAL SPACE [BAKER], UNSPECIFIED KNEE: Chronic | ICD-10-CM

## 2022-03-15 DIAGNOSIS — Z90.2 ACQUIRED ABSENCE OF LUNG [PART OF]: Chronic | ICD-10-CM

## 2022-03-15 DIAGNOSIS — C34.2 MALIGNANT NEOPLASM OF MIDDLE LOBE, BRONCHUS OR LUNG: ICD-10-CM

## 2022-03-15 DIAGNOSIS — Z00.8 ENCOUNTER FOR OTHER GENERAL EXAMINATION: ICD-10-CM

## 2022-03-15 PROCEDURE — 82565 ASSAY OF CREATININE: CPT

## 2022-03-15 PROCEDURE — 71260 CT THORAX DX C+: CPT | Mod: 26

## 2022-03-15 PROCEDURE — 71260 CT THORAX DX C+: CPT

## 2022-03-18 ENCOUNTER — OUTPATIENT (OUTPATIENT)
Dept: OUTPATIENT SERVICES | Facility: HOSPITAL | Age: 67
LOS: 1 days | Discharge: HOME | End: 2022-03-18

## 2022-03-18 DIAGNOSIS — Z90.2 ACQUIRED ABSENCE OF LUNG [PART OF]: Chronic | ICD-10-CM

## 2022-03-18 DIAGNOSIS — M71.20 SYNOVIAL CYST OF POPLITEAL SPACE [BAKER], UNSPECIFIED KNEE: Chronic | ICD-10-CM

## 2022-03-18 DIAGNOSIS — C34.90 MALIGNANT NEOPLASM OF UNSPECIFIED PART OF UNSPECIFIED BRONCHUS OR LUNG: ICD-10-CM

## 2022-03-22 ENCOUNTER — APPOINTMENT (OUTPATIENT)
Dept: HEMATOLOGY ONCOLOGY | Facility: CLINIC | Age: 67
End: 2022-03-22
Payer: COMMERCIAL

## 2022-03-22 VITALS
HEART RATE: 78 BPM | WEIGHT: 238.1 LBS | TEMPERATURE: 97 F | SYSTOLIC BLOOD PRESSURE: 145 MMHG | RESPIRATION RATE: 16 BRPM | BODY MASS INDEX: 38.82 KG/M2 | OXYGEN SATURATION: 96 % | DIASTOLIC BLOOD PRESSURE: 82 MMHG

## 2022-03-22 PROCEDURE — 99213 OFFICE O/P EST LOW 20 MIN: CPT

## 2022-03-22 NOTE — HISTORY OF PRESENT ILLNESS
[Disease: _____________________] : Disease: [unfilled] [de-identified] : Initial consult 2/12/20.  \par 64m, smoker (50 p/y), from [facility], with h/o mycosis fungoides, diagnosed in 2015, s/p 7 cycles of CHOP in [facility], and stage I adenocarcinoma of the lung diagnosed 5/2019 s/p surgical resection and 1 cycle of cisplatin/taxol in [facility].  Patient reports that only 1 cycle was recommended and there were not tolerability issues as to why it was stopped.  There are reports of a renal mass, however that has not been evident on his imaging here.  A few pages of translated medical records were available from [facility] with the above information, and that is all the patient has. He was not able to obtain more medical records.  For 2-3 months prior to initial consult, he had FUO, that is why they traveled to the , where their daughter lives for medical care. Patient's daughter Kamini, is a PA in [place].  He was found to have a renal mass in [facility].  \par CT in Feb 2020 revealed: Enlarged right hilar lymph node unchanged.  Apparent right middle lobectomy with postsurgical scarring.  Bilateral small indeterminant adrenal nodule is unchanged.  Tirso was admitted to [facility] with UTI in March 2020 and received 7 days of IV abx.  He underwent bronchoscopy and biopsy of the hilar LN by Dr Ramos, which showed NSCLC, however quantity is insufficient for PDL1 testing or molecular testing.  Restaging PET/CT revealed evidence of recurrent disease in the intrathoracic LN’s; other findings were non-specific. Brain MRI was negative. Case was reviewed at Thoracic Tumor Board and recommendation was to proceed with treatment as if he had de nestor Stage III disease as it was felt he had disease isolated to the intrathoracic LN’s.  Peripheral blood ct-DNA test negative for actionable mutations.  Mediport was placed by IR.  Started chemotherapy with weekly Carbo/Taxol in late June 2020 and concurrent Thoracic RT in mid-July 2020 with completion of concurrent therapy on 8/24/20.  Restaging CT Chest with stable findings. Started maintenance Durvalumab in early Sept 2020.  Developed rash while on immunotherapy and referred to Derm.  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  Completed 1 year of Durvalumab in Aug 2021.   [de-identified] : ***Patient's daughter provided translation \par \par Following completion of 1 year of immunotherapy, patient returned to his native [place].  He was able [place] the [place] after the war began.  [place].  Patient's son is still in the [facility].\par \par No significant S OB or cough or weight loss.\par \par Restaging CT with overall sustained response and continued RT changes.

## 2022-03-22 NOTE — RESULTS/DATA
[FreeTextEntry1] : -CT Chest Angio 4/23/20:  Nondiagnostic study for pulmonary embolism secondary to poor opacification of the pulmonary arteries. If clinical concern persists, the study may be repeated. Peripheral reticular and groundglass opacities within the right upper lobe as well as the right lower lobe which are new from 2/14/2020. These may be secondary to radiation changes however underlying infection is not excluded. Worsening right hilar adenopathy and increasing pleural-based right density in the right midlung field. Recurrent disease is not excluded at these locations. Lytic lesions within the anterior lateral aspects of the right fourth and fifth ribs concerning for metastatic disease. Partially imaged low attenuating right adrenal gland lesion which is indeterminate. \par \par -CT A/P 4/27/20:  Right-sided pyelonephritis. No abscess or hydronephrosis. \par \par -PET/CT 5/28/20:  Abnormal FDG-PET/CT scan. \par 1. Metastatic right paratracheal and right perihilar lymphadenopathy, increased in size as compared to CT dated 4/23/2020. Small FDG-avid left perihilar lymph node is indeterminate. \par 2. Non-FDG-avid density in right mid lung, unchanged as compared to CT dated 4/27/2020, and slightly increased in size as compared to CT dated 2/14/2020, possibly postsurgical. \par 3. Linear subcutaneous density associated heterogeneous FDG activity, right anterior chest wall, is secondary to prior surgery. Small focus of more intense activity is indeterminate. Please correlate with ultrasound to exclude tumor implant. \par 4. Minimally FDG-avid lucencies within anterolateral aspects of right fourth and fifth ribs, unchanged on CT since 2/14/2020, may be related to prior surgery. Please correlate clinically. \par \par -Peripheral Blood ct-DNA Test / [facility]:  No actionable mutations.  (KRAS G13V). \par \par -MRI Brain 6/11/20:  No acute intracranial pathology or abnormal enhancement. No evidence for intracranial metastatic disease. Microvascular ischemic changes. \par \par -U/S Testicles:  7/1/20:  No testicular lesion. The site of palpable abnormality corresponds to 3.6 x 1.6 x 3.2 cm hypoechoic soft tissue mass involving the skin and subcutaneous tissue in the left superior scrotal wall adjacent to the base of the penis. Internal vascularity is noted. There is no fluid collection. This is likely to represent an inflammatory process and less likely neoplastic. \par \par -CT Chest 8/31/20:  No change from the prior study. Right paratracheal and hilar adenopathy not well evaluated without contrast. \par \par -CT Chest 11/16/20:  \par 1. New right sided paramediastinal opacities can be the sequelae of interval radiation treatment.\par 2. Status post right middle lobe lobectomy.\par 3. Right hilar lymph node measures 2.4 x 2.1 cm. The additional chest lymph nodes measure less than 10 mm in the short axis.\par \par -CT Chest 2/8/21:  Right hilar lymph node is unchanged since November 16, 2020.  Adjacent right mid lung paramediastinal opacities somewhat changed in shape and density likely represent evolving radiation therapy changes.  2 mm left upper lobe pulmonary nodule new since the prior study. Follow-up is recommended.  4 mm left upper lobe groundglass nodule with interval decrease in size.\par \par -CT Neck 5/3/21:  No cervical lymphadenopathy.\par \par -CT Chest 5/3/21:  When compared with prior study of February 8, 2021:  Right perihilar and infrahilar post therapeutic changes with areas of consolidation and architectural distortion and bronchial dilatation are without change since previous examination.  A left-sided perifissural 4 mm nodule on series 4 image 69 is increased since the prior exam where it measured 2 mm. This may be due to differences in slice selection. Attention on follow-up. Previously mentioned left upper lobe 2 mm nodule is no longer identified.\par \par -CT Chest 8/23/21:  Right perihilar postradiation changes. The soft tissue density within the right hilum surrounding the bronchus intermedius (series 2 image 54) is unchanged from 5/3/2021; however, slightly increased from 2/8/2021 possibly representing evolving postradiation changes. Recommend attention on follow-up exam.\par \par Images Reviewed/Interpreted:\par \par –CT Chest 3/15/22:  Stable exam status post right middle lobectomy and right perihilar postradiation changes.\par \par

## 2022-03-22 NOTE — ASSESSMENT
[FreeTextEntry1] : Patient is former smoker (50 p/y), from [facility] with h/o mycosis fungoides, diagnosed in 2015, s/p 7 cycles of CHOP in [facility] and stage I adenocarcinoma of the lung diagnosed 5/2019 s/p surgical resection and 1 cycle of adjuvant cisplatin/taxol in [facility].  CT scan in Feb 2020 with hilar LAD, s/p Bronchoscopy and biopsy, with NSCLC in specimen, but quantity insufficient for further IHC, mutational or PDL-1 testing.  Patient hospitalized in March 2020 with UTI and April 2020 with Covid-19.   Restaging PET/CT with evidence of recurrent disease in the intrathoracic LN’s with other non-specific findings that may be post-surgical.  Case was reviewed at Thoracic Tumor Board and recommendation was to proceed with treatment as if he had de nestor Stage III disease.  Brain MRI was negative.  Peripheral blood ct-DNA test negative for actionable mutations.  Started chemotherapy in late June 2020 and concurrent Thoracic RT in mid-July 2020 with completion of concurrent therapy on 8/24/20.  Restaging CT Chest with stable findings. Started Durvalumab in early Sept 2020 x 1 year completed August 2021. \par Restaging CT March 2022 with sustained response.  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  No evidence of MF.   Was following with PCP. F/U with Derm for management prn.  \par -Elevated PSA:  can consider  referral \par -She will schedule office visit after scan is scheduled

## 2022-03-22 NOTE — PHYSICAL EXAM
[Restricted in physically strenuous activity but ambulatory and able to carry out work of a light or sedentary nature] : Status 1- Restricted in physically strenuous activity but ambulatory and able to carry out work of a light or sedentary nature, e.g., light house work, office work [Normal] : affect appropriate [de-identified] : No icterus [de-identified] : MMM O/P clear [de-identified] : No LAD [de-identified] : Distant BS [de-identified] : S1 S2 [de-identified] : No edema  [de-identified] : No spine/CVA tenderness [de-identified] : Ambulatory

## 2022-05-04 NOTE — PATIENT PROFILE ADULT - MEDICATIONS/VISITS
Detail Level: Detailed Add 07190 Cpt? (Important Note: In 2017 The Use Of 43467 Is Being Tracked By Cms To Determine Future Global Period Reimbursement For Global Periods): yes Repair Type (Optional): Flap Wound Diameter In Cm(Optional): 0 Wound Crusting?: clean Sutures?: intact Wound Edema?: minimal Wound Color?: pink Wound Bruising?: mild Any New Or Residual Neoplasm?: No Patient To Follow-Up With?: our clinic Follow Up Provider (Optional): for next full body skin check Follow Up Units (Optional): 6 Follow Up Time Frame (Optional): months no

## 2022-10-06 ENCOUNTER — OUTPATIENT (OUTPATIENT)
Dept: OUTPATIENT SERVICES | Facility: HOSPITAL | Age: 67
LOS: 1 days | Discharge: HOME | End: 2022-10-06

## 2022-10-06 DIAGNOSIS — M71.20 SYNOVIAL CYST OF POPLITEAL SPACE [BAKER], UNSPECIFIED KNEE: Chronic | ICD-10-CM

## 2022-10-06 DIAGNOSIS — C34.90 MALIGNANT NEOPLASM OF UNSPECIFIED PART OF UNSPECIFIED BRONCHUS OR LUNG: ICD-10-CM

## 2022-10-06 DIAGNOSIS — Z90.2 ACQUIRED ABSENCE OF LUNG [PART OF]: Chronic | ICD-10-CM

## 2022-10-07 ENCOUNTER — OUTPATIENT (OUTPATIENT)
Dept: OUTPATIENT SERVICES | Facility: HOSPITAL | Age: 67
LOS: 1 days | End: 2022-10-07
Payer: MEDICAID

## 2022-10-07 ENCOUNTER — OUTPATIENT (OUTPATIENT)
Dept: OUTPATIENT SERVICES | Facility: HOSPITAL | Age: 67
LOS: 1 days | Discharge: HOME | End: 2022-10-07

## 2022-10-07 ENCOUNTER — APPOINTMENT (OUTPATIENT)
Dept: CT IMAGING | Facility: IMAGING CENTER | Age: 67
End: 2022-10-07

## 2022-10-07 DIAGNOSIS — C34.2 MALIGNANT NEOPLASM OF MIDDLE LOBE, BRONCHUS OR LUNG: ICD-10-CM

## 2022-10-07 DIAGNOSIS — M71.20 SYNOVIAL CYST OF POPLITEAL SPACE [BAKER], UNSPECIFIED KNEE: Chronic | ICD-10-CM

## 2022-10-07 DIAGNOSIS — C34.90 MALIGNANT NEOPLASM OF UNSPECIFIED PART OF UNSPECIFIED BRONCHUS OR LUNG: ICD-10-CM

## 2022-10-07 DIAGNOSIS — Z90.2 ACQUIRED ABSENCE OF LUNG [PART OF]: Chronic | ICD-10-CM

## 2022-10-07 PROCEDURE — 71250 CT THORAX DX C-: CPT

## 2022-10-07 PROCEDURE — 71250 CT THORAX DX C-: CPT | Mod: 26

## 2022-10-12 ENCOUNTER — APPOINTMENT (OUTPATIENT)
Dept: HEMATOLOGY ONCOLOGY | Facility: CLINIC | Age: 67
End: 2022-10-12

## 2022-10-12 VITALS
WEIGHT: 227.52 LBS | SYSTOLIC BLOOD PRESSURE: 142 MMHG | HEIGHT: 65.67 IN | BODY MASS INDEX: 37 KG/M2 | HEART RATE: 83 BPM | TEMPERATURE: 97.2 F | RESPIRATION RATE: 16 BRPM | DIASTOLIC BLOOD PRESSURE: 85 MMHG | OXYGEN SATURATION: 98 %

## 2022-10-12 DIAGNOSIS — R91.8 OTHER NONSPECIFIC ABNORMAL FINDING OF LUNG FIELD: ICD-10-CM

## 2022-10-12 PROCEDURE — 99213 OFFICE O/P EST LOW 20 MIN: CPT

## 2022-10-25 NOTE — HISTORY OF PRESENT ILLNESS
[Disease: _____________________] : Disease: [unfilled] [de-identified] : Initial consult 2/12/20.  \par 64m, smoker (50 p/y), from [facility], with h/o mycosis fungoides, diagnosed in 2015, s/p 7 cycles of CHOP in [facility], and stage I adenocarcinoma of the lung diagnosed 5/2019 s/p surgical resection and 1 cycle of cisplatin/taxol in [facility].  Patient reports that only 1 cycle was recommended and there were not tolerability issues as to why it was stopped.  There are reports of a renal mass, however that has not been evident on his imaging here.  A few pages of translated medical records were available from [facility] with the above information, and that is all the patient has. He was not able to obtain more medical records.  For 2-3 months prior to initial consult, he had FUO, that is why they traveled to the , where their daughter lives for medical care. Patient's daughter Kamini, is a PA in [place].  He was found to have a renal mass in [facility].  \par CT in Feb 2020 revealed: Enlarged right hilar lymph node unchanged.  Apparent right middle lobectomy with postsurgical scarring.  Bilateral small indeterminant adrenal nodule is unchanged.  Tirso was admitted to [facility] with UTI in March 2020 and received 7 days of IV abx.  He underwent bronchoscopy and biopsy of the hilar LN by Dr Ramos, which showed NSCLC, however quantity is insufficient for PDL1 testing or molecular testing.  Restaging PET/CT revealed evidence of recurrent disease in the intrathoracic LN’s; other findings were non-specific. Brain MRI was negative. Case was reviewed at Thoracic Tumor Board and recommendation was to proceed with treatment as if he had de nestor Stage III disease as it was felt he had disease isolated to the intrathoracic LN’s.  Peripheral blood ct-DNA test negative for actionable mutations.  Mediport was placed by IR.  Started chemotherapy with weekly Carbo/Taxol in late June 2020 and concurrent Thoracic RT in mid-July 2020 with completion of concurrent therapy on 8/24/20.  Restaging CT Chest with stable findings. Started maintenance Durvalumab in early Sept 2020.  Developed rash while on immunotherapy and referred to Derm.  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  Completed 1 year of Durvalumab in Aug 2021.   [de-identified] : ***Patient's daughter provided translation;  He presents with his daughter and infant grandchild \par \par No significant S OB or cough or weight loss.  He c/o chronic post-thoracotomy discomfort in his right chest near the scar; discussed this was common.  \par \par Restaging CT with overall sustained response and continued RT changes.

## 2022-10-25 NOTE — RESULTS/DATA
[FreeTextEntry1] : -CT Chest Angio 4/23/20:  Nondiagnostic study for pulmonary embolism secondary to poor opacification of the pulmonary arteries. If clinical concern persists, the study may be repeated. Peripheral reticular and groundglass opacities within the right upper lobe as well as the right lower lobe which are new from 2/14/2020. These may be secondary to radiation changes however underlying infection is not excluded. Worsening right hilar adenopathy and increasing pleural-based right density in the right midlung field. Recurrent disease is not excluded at these locations. Lytic lesions within the anterior lateral aspects of the right fourth and fifth ribs concerning for metastatic disease. Partially imaged low attenuating right adrenal gland lesion which is indeterminate. \par \par -CT A/P 4/27/20:  Right-sided pyelonephritis. No abscess or hydronephrosis. \par \par -PET/CT 5/28/20:  Abnormal FDG-PET/CT scan. \par 1. Metastatic right paratracheal and right perihilar lymphadenopathy, increased in size as compared to CT dated 4/23/2020. Small FDG-avid left perihilar lymph node is indeterminate. \par 2. Non-FDG-avid density in right mid lung, unchanged as compared to CT dated 4/27/2020, and slightly increased in size as compared to CT dated 2/14/2020, possibly postsurgical. \par 3. Linear subcutaneous density associated heterogeneous FDG activity, right anterior chest wall, is secondary to prior surgery. Small focus of more intense activity is indeterminate. Please correlate with ultrasound to exclude tumor implant. \par 4. Minimally FDG-avid lucencies within anterolateral aspects of right fourth and fifth ribs, unchanged on CT since 2/14/2020, may be related to prior surgery. Please correlate clinically. \par \par -Peripheral Blood ct-DNA Test / [facility]:  No actionable mutations.  (KRAS G13V). \par \par -MRI Brain 6/11/20:  No acute intracranial pathology or abnormal enhancement. No evidence for intracranial metastatic disease. Microvascular ischemic changes. \par \par -U/S Testicles:  7/1/20:  No testicular lesion. The site of palpable abnormality corresponds to 3.6 x 1.6 x 3.2 cm hypoechoic soft tissue mass involving the skin and subcutaneous tissue in the left superior scrotal wall adjacent to the base of the penis. Internal vascularity is noted. There is no fluid collection. This is likely to represent an inflammatory process and less likely neoplastic. \par \par -CT Chest 8/31/20:  No change from the prior study. Right paratracheal and hilar adenopathy not well evaluated without contrast. \par \par -CT Chest 11/16/20:  \par 1. New right sided paramediastinal opacities can be the sequelae of interval radiation treatment.\par 2. Status post right middle lobe lobectomy.\par 3. Right hilar lymph node measures 2.4 x 2.1 cm. The additional chest lymph nodes measure less than 10 mm in the short axis.\par \par -CT Chest 2/8/21:  Right hilar lymph node is unchanged since November 16, 2020.  Adjacent right mid lung paramediastinal opacities somewhat changed in shape and density likely represent evolving radiation therapy changes.  2 mm left upper lobe pulmonary nodule new since the prior study. Follow-up is recommended.  4 mm left upper lobe groundglass nodule with interval decrease in size.\par \par -CT Neck 5/3/21:  No cervical lymphadenopathy.\par \par -CT Chest 5/3/21:  When compared with prior study of February 8, 2021:  Right perihilar and infrahilar post therapeutic changes with areas of consolidation and architectural distortion and bronchial dilatation are without change since previous examination.  A left-sided perifissural 4 mm nodule on series 4 image 69 is increased since the prior exam where it measured 2 mm. This may be due to differences in slice selection. Attention on follow-up. Previously mentioned left upper lobe 2 mm nodule is no longer identified.\par \par -CT Chest 8/23/21:  Right perihilar postradiation changes. The soft tissue density within the right hilum surrounding the bronchus intermedius (series 2 image 54) is unchanged from 5/3/2021; however, slightly increased from 2/8/2021 possibly representing evolving postradiation changes. Recommend attention on follow-up exam.\par \par –CT Chest 3/15/22:  Stable exam status post right middle lobectomy and right perihilar postradiation changes.\par \par Images Reviewed/Interpreted:\par \par –CT Chest 10/7/22:  Stable right middle lobectomy and right perihilar postradiation changes.  Stable 8 mm indistinct groundglass nodule within right upper lobe. No new or enlarging nodule.\par \par

## 2022-10-25 NOTE — ASSESSMENT
[FreeTextEntry1] : Patient is former smoker (50 p/y), from [facility], with h/o mycosis fungoides, diagnosed in 2015, s/p 7 cycles of CHOP in [facility], and stage I adenocarcinoma of the lung diagnosed 5/2019 s/p surgical resection and 1 cycle of adjuvant cisplatin/taxol in [facility].  CT scan in Feb 2020 with hilar LAD, s/p Bronchoscopy and biopsy, with NSCLC in specimen, but quantity insufficient for further IHC, mutational or PDL-1 testing.  Patient hospitalized in March 2020 with UTI and April 2020 with Covid-19.   Restaging PET/CT with evidence of recurrent disease in the intrathoracic LN’s with other non-specific findings that may be post-surgical.  Case was reviewed at Thoracic Tumor Board and recommendation was to proceed with treatment as if he had de nestor Stage III disease.  Brain MRI was negative.  Peripheral blood ct-DNA test negative for actionable mutations.  Started chemotherapy in late June 2020 and concurrent Thoracic RT in mid-July 2020 with completion of concurrent therapy on 8/24/20.  Restaging CT Chest with stable findings. Started Durvalumab in early Sept 2020 x 1 year completed August 2021. \par Restaging CT Oct 2022 with sustained response.  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  No evidence of MF.   Was following with PCP. F/U with Derm for management prn.  \par -Previous elevated PSA:  can consider  referral \par -She will schedule office visit after scan is scheduled

## 2022-10-25 NOTE — PHYSICAL EXAM
[Restricted in physically strenuous activity but ambulatory and able to carry out work of a light or sedentary nature] : Status 1- Restricted in physically strenuous activity but ambulatory and able to carry out work of a light or sedentary nature, e.g., light house work, office work [Normal] : affect appropriate [de-identified] : No icterus [de-identified] : MMM O/P clear [de-identified] : No LAD [de-identified] : Distant BS [de-identified] : S1 S2 [de-identified] : No edema  [de-identified] : No spine/CVA tenderness [de-identified] : Right thoracotomy scar

## 2023-01-03 ENCOUNTER — NON-APPOINTMENT (OUTPATIENT)
Age: 68
End: 2023-01-03

## 2023-03-24 ENCOUNTER — OUTPATIENT (OUTPATIENT)
Dept: OUTPATIENT SERVICES | Facility: HOSPITAL | Age: 68
LOS: 1 days | End: 2023-03-24
Payer: MEDICAID

## 2023-03-24 ENCOUNTER — APPOINTMENT (OUTPATIENT)
Dept: CT IMAGING | Facility: CLINIC | Age: 68
End: 2023-03-24
Payer: MEDICAID

## 2023-03-24 DIAGNOSIS — M71.20 SYNOVIAL CYST OF POPLITEAL SPACE [BAKER], UNSPECIFIED KNEE: Chronic | ICD-10-CM

## 2023-03-24 DIAGNOSIS — Z90.2 ACQUIRED ABSENCE OF LUNG [PART OF]: Chronic | ICD-10-CM

## 2023-03-24 DIAGNOSIS — C34.2 MALIGNANT NEOPLASM OF MIDDLE LOBE, BRONCHUS OR LUNG: ICD-10-CM

## 2023-03-24 PROCEDURE — 71250 CT THORAX DX C-: CPT | Mod: 26

## 2023-03-24 PROCEDURE — 71250 CT THORAX DX C-: CPT

## 2023-03-27 ENCOUNTER — OUTPATIENT (OUTPATIENT)
Dept: OUTPATIENT SERVICES | Facility: HOSPITAL | Age: 68
LOS: 1 days | Discharge: HOME | End: 2023-03-27

## 2023-03-27 DIAGNOSIS — C34.90 MALIGNANT NEOPLASM OF UNSPECIFIED PART OF UNSPECIFIED BRONCHUS OR LUNG: ICD-10-CM

## 2023-03-27 DIAGNOSIS — Z90.2 ACQUIRED ABSENCE OF LUNG [PART OF]: Chronic | ICD-10-CM

## 2023-03-27 DIAGNOSIS — M71.20 SYNOVIAL CYST OF POPLITEAL SPACE [BAKER], UNSPECIFIED KNEE: Chronic | ICD-10-CM

## 2023-03-28 ENCOUNTER — APPOINTMENT (OUTPATIENT)
Dept: HEMATOLOGY ONCOLOGY | Facility: CLINIC | Age: 68
End: 2023-03-28
Payer: MEDICAID

## 2023-03-28 VITALS
WEIGHT: 246.47 LBS | TEMPERATURE: 97.1 F | SYSTOLIC BLOOD PRESSURE: 153 MMHG | DIASTOLIC BLOOD PRESSURE: 88 MMHG | BODY MASS INDEX: 40.18 KG/M2 | RESPIRATION RATE: 18 BRPM | OXYGEN SATURATION: 96 % | HEART RATE: 86 BPM

## 2023-03-28 DIAGNOSIS — C34.2 MALIGNANT NEOPLASM OF MIDDLE LOBE, BRONCHUS OR LUNG: ICD-10-CM

## 2023-03-28 DIAGNOSIS — C77.1 SECONDARY AND UNSPECIFIED MALIGNANT NEOPLASM OF INTRATHORACIC LYMPH NODES: ICD-10-CM

## 2023-03-28 PROCEDURE — 99213 OFFICE O/P EST LOW 20 MIN: CPT

## 2023-03-31 NOTE — PHYSICAL EXAM
[Restricted in physically strenuous activity but ambulatory and able to carry out work of a light or sedentary nature] : Status 1- Restricted in physically strenuous activity but ambulatory and able to carry out work of a light or sedentary nature, e.g., light house work, office work [Normal] : normal appearance, no rash, nodules, vesicles, ulcers, erythema [de-identified] : No icterus [de-identified] : MMM O/P clear [de-identified] : No LAD [de-identified] : Distant BS [de-identified] : S1 S2 [de-identified] : No edema  [de-identified] : No spine/CVA tenderness

## 2023-03-31 NOTE — RESULTS/DATA
[FreeTextEntry1] : Images Reviewed/Interpreted:\par \par –CT Chest 3/24/23:  Since 10/7/2022:  Unchanged radiation fibrosis in the right lung.  Unchanged right upper lobe 8 mm groundglass nodule.  No finding to suggest new or progressive disease.\par \par \par

## 2023-03-31 NOTE — HISTORY OF PRESENT ILLNESS
[Disease: _____________________] : Disease: [unfilled] [de-identified] : Initial consult 2/12/20.  \par Patient is a smoker (50 p/y), from [facility], with h/o mycosis fungoides, diagnosed in 2015, s/p 7 cycles of CHOP in [facility], and stage I adenocarcinoma of the lung diagnosed 5/2019 s/p surgical resection and 1 cycle of cisplatin/taxol in [facility].  Patient reports that only 1 cycle was recommended and there were not tolerability issues as to why it was stopped.  There are reports of a renal mass, however that has not been evident on his imaging here.  A few pages of translated medical records were available from [facility] with the above information, and that is all the patient has. He was not able to obtain more medical records.  For 2-3 months prior to initial consult, he had FUO, that is why they traveled to the , where their daughter lives for medical care. Patient's daughter Kamini, is a PA in [place].  He was found to have a renal mass in [facility].  \par CT in Feb 2020 revealed: Enlarged right hilar lymph node unchanged.  Apparent right middle lobectomy with postsurgical scarring.  Bilateral small indeterminant adrenal nodule is unchanged.  Tirso was admitted to [facility] with UTI in March 2020 and received 7 days of IV abx.  He underwent bronchoscopy and biopsy of the hilar LN by Dr Ramos, which showed NSCLC, however quantity is insufficient for PDL1 testing or molecular testing.  Restaging PET/CT revealed evidence of recurrent disease in the intrathoracic LN’s; other findings were non-specific. Brain MRI was negative. Case was reviewed at Thoracic Tumor Board and recommendation was to proceed with treatment as if he had de nestor Stage III disease as it was felt he had disease isolated to the intrathoracic LN’s.  Peripheral blood ct-DNA test negative for actionable mutations.  Mediport was placed by IR.  Started chemotherapy with weekly Carbo/Taxol in late June 2020 and concurrent Thoracic RT in mid-July 2020 with completion of concurrent therapy on 8/24/20.  Restaging CT Chest with stable findings. Started maintenance Durvalumab in early Sept 2020.  Developed rash while on immunotherapy and referred to Derm.  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  Completed 1 year of Durvalumab in Aug 2021.  Followed with surveillance.   [de-identified] : ***Patient's daughter provided translation;  He presents with his daughter.   \par \par He complains of numbness/tingling in his fingers bilaterally; daughter who is a PA suspects he may have carpal tunnel syndrome.  He gains weight and she inquired about use of Ozempic; defer to his PCP.  No significant SOB.  He continues to complain of chronic postthoracotomy discomfort in his right chest near the scar, for which we discussed this was common.  He has occasional pruritus in his feet but no other significant dermatologic issues.  \par \par Surveillance CT chest with overall sustained response and no evidence of recurrent or progressive disease.

## 2023-03-31 NOTE — ASSESSMENT
[FreeTextEntry1] : Patient is former smoker (50 p/y), from [facility], with h/o mycosis fungoides, diagnosed in 2015, s/p 7 cycles of CHOP in [facility], and stage I adenocarcinoma of the lung diagnosed 5/2019 s/p surgical resection and 1 cycle of adjuvant cisplatin/taxol in [facility].  CT scan in Feb 2020 with hilar LAD, s/p Bronchoscopy and biopsy, with NSCLC in specimen, but quantity insufficient for further IHC, mutational or PDL-1 testing.  Patient hospitalized in March 2020 with UTI and April 2020 with Covid-19.   Restaging PET/CT with evidence of recurrent disease in the intrathoracic LN’s with other non-specific findings that may be post-surgical.  Case was reviewed at Thoracic Tumor Board and recommendation was to proceed with treatment as if he had de nestor Stage III disease.  Brain MRI was negative.  Peripheral blood ct-DNA test negative for actionable mutations.  Started chemotherapy in late June 2020 and concurrent Thoracic RT in mid-July 2020 with completion of concurrent therapy on 8/24/20.  Restaging CT Chest with stable findings. Started Durvalumab in early Sept 2020 x 1 year completed August 2021. \par Restaging CT March 2023 with sustained response.  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  No evidence of MF.  F/U with Derm PRN\par -F/U with PCP for HCM\par – Check out form given to patient with instructions for making appointments

## 2023-09-14 ENCOUNTER — OUTPATIENT (OUTPATIENT)
Dept: OUTPATIENT SERVICES | Facility: HOSPITAL | Age: 68
LOS: 1 days | Discharge: HOME | End: 2023-09-14

## 2023-09-14 DIAGNOSIS — Z90.2 ACQUIRED ABSENCE OF LUNG [PART OF]: Chronic | ICD-10-CM

## 2023-09-14 DIAGNOSIS — C34.90 MALIGNANT NEOPLASM OF UNSPECIFIED PART OF UNSPECIFIED BRONCHUS OR LUNG: ICD-10-CM

## 2023-09-14 DIAGNOSIS — M71.20 SYNOVIAL CYST OF POPLITEAL SPACE [BAKER], UNSPECIFIED KNEE: Chronic | ICD-10-CM

## 2023-09-26 ENCOUNTER — APPOINTMENT (OUTPATIENT)
Dept: HEMATOLOGY ONCOLOGY | Facility: CLINIC | Age: 68
End: 2023-09-26

## 2023-10-27 NOTE — REASON FOR VISIT
No [Initial] : an initial visit [TextBox_44] : SOB, COPD / restrictive dysfunction, s/p lobectomy, GERD, ?allergies, ?anemia, ?OSAS, s/p COVID-19 infection

## 2024-01-26 NOTE — H&P PST ADULT - FALLEN IN THE PAST
[Joint Pain] : joint pain [Negative] : Heme/Lymph [Joint Stiffness] : joint stiffness [Palpitations] : palpitations [FreeTextEntry9] : intermittent bilateral shoulder pain no

## 2025-07-28 NOTE — ASSESSMENT
[FreeTextEntry1] : Mr. DELGADO  is a 64 year old male with a history of  40 pack year smoker, mycosis fungoids lymphoma, non small cell carcinoma of lung 2019, s/p port, right middle lobectomy, finished chemo, radiation therapy currently on immunotherapy, HTN, GERD, s/p COVID-19 Apri 2020, who now comes to the office for an initial pulmonary evaluation for SOB \par \par His shortness of breath is multifactorial due to:\par -poor mechanics of breathing \par -out of shape / overweight\par -pulmonary disease\par    - restrictive dysfunction (s/p lobectomy)\par    - COPD \par -?cardiac disease \par     - ?Anemia \par \par \par problem 1: COPD /  restrictive dysfunction (s/p lobectomy)\par - Add Trelegy 1 puff QD \par - get full PFTs \par -Inhaler technique reviewed as well as oral hygiene techniques reviewed with patient. Avoidance of cold air, extremes of temperature, rescue inhaler should be used before exercise. Order of medication reviewed with patient. Recommended use of a cool mist humidifier in the bedroom. \par -COPD is a progressive disease and although it can’t be cured , appropriate management can slow its progression, reduce frequency and severity of exacerbations, and improve symptoms and the patient quality of life. Hospitalizations are the greatest contributor to the total COPD costs and account for up to 87% of total COPD related costs. Exacerbations are the main cause of admissions and subsequently account for the 40-75% of COPD costs. Inhaled maintenance therapy reduces the incidence of exacerbations in patients with stable COPD. Incorrect inhaler use and nonadherence are major obstacles to achieving COPD treatment goals. Many COPD patients have challenges (impaired inhalation, limited dexterity, reduced cognition: that limit their ability to correctly use their COPD treatment devices resulting in reduced symptom control. Of most importance is smoking cessation and early intervention with respiratory illnesses and contemplation for pulmonary rehab to enhance quality of life.\par \par \par \par problem 2: Allergies / Sinus \par -add Olopatadine 0.6% 1 sniff BID \par - script given for blood work: asthma profile, food IgE panel, eosinophil level, IgE level, Vitamin D level \par - Environmental measures for allergies were encouraged including mattress and pillow cover, air purifier, and environmental controls. \par \par \par Problem 3: ?Anemia \par - Get blood work: Iron Studies \par \par \par Problem 4: GERD\par -add Reglan 5 mg pre-meal, QHS\par -continue Pantoprazole 40 mg QAM  \par -Rule of 2s: avoid eating too much, eating too late, eating too spicy, eating two hours before bed.\par -Things to avoid including overeating, spicy foods, tight clothing, eating within three hours of bed, this list is not all inclusive. \par -For treatment of reflux, possible options discussed including diet control, H2 blockers, PPIs, as well as coating motility agents discussed as treatment options. Timing of meals and proximity of last meal to sleep were discussed. If symptoms persist, a formal gastrointestinal evaluation is needed.\par \par \par Problem 5: Lung CA (s/p chemotherpay / Radition)\par -  on immunotherapy \par \par \par Problem 6: r/o ?OSAS (risk factors: metabolism, snoring, neck size: 18, memory / concentration) \par - get home sleep study \par Sleep apnea is associated with adverse clinical consequences which can affect most organ systems.  Cardiovascular disease risk includes arrhythmias, atrial fibrillation, hypertension, coronary artery disease, and stroke. Metabolic disorders include diabetes type 2, non-alcoholic fatty liver disease. Mood disorder especially depression; and cognitive decline especially in the elderly. Associations with  chronic reflux/Hsu’s esophagus some but not all inclusive. \par -Reasons  include arousal consistent with hypopnea; respiratory events most prominent in REM sleep or supine position; therefore sleep staging and body position are important for accurate diagnosis and estimation of AHI.\par \par Problem 7: s/p COVID-19 infection s/p April 2020 \par - no residual from COVID on CXR \par Immune Support Recommendations:\par -OTC Vitamin C 500mg BID \par -OTC Quercetin 250-500mg BID \par -OTC Zinc 75-100mg per day \par -OTC Melatonin 1or 2mg a night \par -OTC Vitamin D 1-4000mg per day \par -OTC Tonic Water 8oz per day\par  \par \par problem 8: cardiac disease\par -recommended to continue to follow up with Cardiologist \par \par problem 9 : poor breathing mechanics\par -Proper breathing techniques were reviewed with an emphasis of exhalation. Patient instructed to breath in for 1 second and out for four seconds. Patient was encouraged to not talk while walking. \par \par problem  10: out of shape / overweight\par -Weight loss, exercise, and diet control were discussed and are highly encouraged. Treatment options were given such as, aqua therapy, and contacting a nutritionist. Recommended to use the elliptical, stationary bike, less use of treadmill. \par \par problem 11 : health maintenance \par - recommended humidifier on\par -recommended yearly flu shot after October 15\par -recommended strep pneumonia vaccines: Prevnar-13 vaccine, followed by Pneumo vaccine 23 one year following after 65 years old. \par -recommended early intervention for Upper Respiratory Infections (URIs)\par -recommended regular osteoporosis evaluations\par -recommended early dermatological evaluations\par -recommended after the age of 50 to the age of 70, colonoscopy every 5 years\par \par F/U in 6-8 weeks.\par He is encouraged to call with any changes, concerns, or questions\par \par  [Follow - Up] : a follow-up visit [Hypothyroidism] : hypothyroidism [Weight Management/Obesity] : weight management/obesity